# Patient Record
Sex: FEMALE | Race: BLACK OR AFRICAN AMERICAN | NOT HISPANIC OR LATINO | Employment: FULL TIME | URBAN - METROPOLITAN AREA
[De-identification: names, ages, dates, MRNs, and addresses within clinical notes are randomized per-mention and may not be internally consistent; named-entity substitution may affect disease eponyms.]

---

## 2017-01-26 ENCOUNTER — TRANSFERRED RECORDS (OUTPATIENT)
Dept: HEALTH INFORMATION MANAGEMENT | Facility: CLINIC | Age: 47
End: 2017-01-26

## 2017-01-26 ENCOUNTER — TELEPHONE (OUTPATIENT)
Dept: FAMILY MEDICINE | Facility: CLINIC | Age: 47
End: 2017-01-26

## 2017-01-26 NOTE — TELEPHONE ENCOUNTER
Lashonda Rivera is a 46 year old female who calls with chest pain.     PRESENTING PROBLEM:  Chest pain    NURSING ASSESSMENT:  Patient complains of chest pain and fatigue.  Onset:  4 days  Pain is characterized as pressure, sharp and tightness   Severity 3/10-6/10 out of 10 and intermittent  Located right chest   Radiates to none.  Duration intermittent.  Associated symptoms SOB, nausea and lightheadedness. Patient also reports having possible sinus congestion and believes she may have a sinus infection.  Exacerbated by exertion.  Relieved by none.  Cardiac risk factors: none.  Associated Medical History: n/a  Allergies:   Allergies   Allergen Reactions     Vicodin [Hydrocodone-Acetaminophen]      itchy       Last exam/Treatment:  10/12/16    NURSING PLAN: Nursing advice to patient proceed to ER for further eval    RECOMMENDED DISPOSITION:  To ED, another person to drive   Will comply with recommendation: Yes  If further questions/concerns or if symptoms do not improve, worsen or new symptoms develop, call your PCP or [facility] as soon as possible.      Guideline used:  Telephone Triage Protocols for Nurses, Fifth Edition, Angeli Garcia RN

## 2017-02-21 ENCOUNTER — OFFICE VISIT (OUTPATIENT)
Dept: FAMILY MEDICINE | Facility: CLINIC | Age: 47
End: 2017-02-21
Payer: COMMERCIAL

## 2017-02-21 VITALS
TEMPERATURE: 97.5 F | WEIGHT: 142 LBS | HEART RATE: 89 BPM | BODY MASS INDEX: 24.24 KG/M2 | DIASTOLIC BLOOD PRESSURE: 75 MMHG | HEIGHT: 64 IN | SYSTOLIC BLOOD PRESSURE: 122 MMHG

## 2017-02-21 DIAGNOSIS — N89.8 VAGINAL IRRITATION: Primary | ICD-10-CM

## 2017-02-21 LAB
MICRO REPORT STATUS: NORMAL
SPECIMEN SOURCE: NORMAL
WET PREP SPEC: NORMAL

## 2017-02-21 PROCEDURE — 99213 OFFICE O/P EST LOW 20 MIN: CPT | Performed by: PREVENTIVE MEDICINE

## 2017-02-21 PROCEDURE — 87210 SMEAR WET MOUNT SALINE/INK: CPT | Performed by: PREVENTIVE MEDICINE

## 2017-02-21 ASSESSMENT — PAIN SCALES - GENERAL: PAINLEVEL: NO PAIN (0)

## 2017-02-21 NOTE — PATIENT INSTRUCTIONS
At [facility], we strive to deliver an exceptional experience to you, every time we see you.    If you receive a survey in the mail, please send us back your thoughts. We really do value your feedback.    Thank you for visiting [facility]    Normal or non-critical lab and imaging results will be communicated to you by MyChart, letter or phone within 7 days.  If you do not hear from us within 10 days, please call the clinic. If you have a critical or abnormal lab result, we will notify you by phone as soon as possible.     If you have any questions regarding your visit please contact:     Team Radha/Spirit  Clinic Hours Telephone Number   Dr. Lance Cedeno   7am-7pm  Monday through Thursday  7am-5pm Friday [phone]  Charisse LOPEZ RN   Pharmacy 8:30am-9pm Monday-Friday    9am-5pm Saturday-Sunday [phone]   Urgent Care 11am-9pm Monday-Friday        9am-5pm Saturday-Sunday [phone]     After hours, weekend or if you need to make an appointment with your primary provider please call [phone].   After Hours nurse advise: call [facility]: [phone]    Use Green Vision Systemshart (secure email communication and access to your chart) to send your primary care provider a message or make an appointment. Ask someone on your Team how to sign up for [employer]garments. To log on to [employer] or for more information in [place] please visit the website at [email or web address].   As of October 8, 2013, all password changes, disabled accounts, or ID changes in [employer]/MyHealth will be done by our Access Services Department.   If you need help with your account or password, call: [phone]. Clinic staff no longer has the ability to change passwords.
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
## 2017-02-21 NOTE — MR AVS SNAPSHOT
After Visit Summary   2/21/2017    Lashonda Rivera    MRN: [id number]           Patient Information     Date Of Birth          1970        Visit Information        Provider Department      2/21/2017 1:40 PM Asmita Bhatia MD [facility]        Today's Diagnoses     Vaginal irritation    -  1      Care Instructions    At [facility], we strive to deliver an exceptional experience to you, every time we see you.    If you receive a survey in the mail, please send us back your thoughts. We really do value your feedback.    Thank you for visiting [facility]    Normal or non-critical lab and imaging results will be communicated to you by MyChart, letter or phone within 7 days.  If you do not hear from us within 10 days, please call the clinic. If you have a critical or abnormal lab result, we will notify you by phone as soon as possible.     If you have any questions regarding your visit please contact:     Team Radha/Spirit  Clinic Hours Telephone Number   Dr. Lance Cedeno   7am-7pm  Monday through Thursday  7am-5pm Friday [phone]  Charisse LOPEZ RN   Pharmacy 8:30am-9pm Monday-Friday    9am-5pm Saturday-Sunday [phone]   Urgent Care 11am-9pm Monday-Friday        9am-5pm Saturday-Sunday [phone]     After hours, weekend or if you need to make an appointment with your primary provider please call [phone].   After Hours nurse advise: call [facility]: [phone]    Use Gauzyhart (secure email communication and access to your chart) to send your primary care provider a message or make an appointment. Ask someone on your Team how to sign up for mobicanvas. To log on to [employer] or for more information in [place] please visit the website at [email or web address].   As of October 8, 2013, all password changes,  disabled accounts, or ID changes in GOODWIN/MyHealth will be done by our Access Services Department.   If you need help with your account or password, call: [phone]. Clinic staff no longer has the ability to change passwords.           Follow-ups after your visit        Follow-up notes from your care team     Return if symptoms worsen or fail to improve.      Your next 10 appointments already scheduled     Feb 25, 2017 11:30 AM CST   [facility] SCREENING DIG[id number]    [address] [place] [address] [phone]           Do not use any powder, lotion or deodorant under your arms or on your breast. If you do, we will ask you to remove it before your exam.  Wear comfortable, two-piece clothing.  If you have any allergies, tell your care team.  Bring any previous mammograms from other facilities or have them mailed to the breast center.              Who to contact     If you have questions or need follow up information about today's clinic visit or your schedule please contact [facility] directly at [phone].  Normal or non-critical lab and imaging results will be communicated to you by Crazideahart, letter or phone within 4 business days after the clinic has received the results. If you do not hear from us within 7 days, please contact the clinic through GigsTimet or phone. If you have a critical or abnormal lab result, we will notify you by phone as soon as possible.  Submit refill requests through GOODWIN or call your pharmacy and they will forward the refill request to us. Please allow 3 business days for your refill to be completed.          Additional Information About Your Visit        GOODWIN Information     GOODWIN gives you secure access to your electronic health record. If you see a primary care provider, you can also send messages to your care team and make appointments. If you have  "questions, please call your primary care clinic.  If you do not have a primary care provider, please call [phone] and they will assist you.        Care EveryWhere ID     This is your Care EveryWhere ID. This could be used by other organizations to access your [place] medical records  [id number]        Your Vitals Were     Pulse Temperature Height Last Period Breastfeeding? BMI (Body Mass Index)    89 97.5  F (36.4  C) (Oral) 5' 4\" (1.626 m) 06/10/2013 No 24.37 kg/m2       Blood Pressure from Last 3 Encounters:   02/21/17 122/75   10/12/16 105/72   10/04/16 118/73    Weight from Last 3 Encounters:   02/21/17 142 lb (64.4 kg)   10/12/16 142 lb 12.8 oz (64.8 kg)   10/04/16 137 lb 2 oz (62.2 kg)              We Performed the Following     Wet prep        Primary Care Provider Office Phone # Fax #    Asmita Bhatia [phone]       [facility] [address]  [facility] [address]        Thank you!     Thank you for choosing [facility]  for your care. Our goal is always to provide you with excellent care. Hearing back from our patients is one way we can continue to improve our services. Please take a few minutes to complete the written survey that you may receive in the mail after your visit with us. Thank you!             Your Updated Medication List - Protect others around you: Learn how to safely use, store and throw away your medicines at www.disposemymeds.org.          This list is accurate as of: 2/21/17  2:38 PM.  Always use your most recent med list.                   Brand Name Dispense Instructions for use    ADVIL PO      Take 200 mg by mouth       cyclobenzaprine 5 MG tablet    FLEXERIL    30 tablet    Take 1 tablet (5 mg) by mouth 2 times daily as needed for muscle spasms       fluticasone 50 MCG/ACT spray    FLONASE    16 g    Spray 1-2 sprays into both nostrils daily       FOLIC ACID PO      Take 1 mg by mouth daily       METOCLOPRAMIDE HCL PO "      Take 10 mg by mouth daily as needed       PROBIOTIC DAILY PO      Take 2 tablets by mouth daily       SUMAtriptan 100 MG tablet    IMITREX    18 tablet    Take 1 tablet (100 mg) by mouth at onset of headache for migraine May repeat in 2 hours if needed: max 2/day       tiZANidine 2 MG tablet    ZANAFLEX         VITAMIN C PO          VITAMIN D (CHOLECALCIFEROL) PO      Take by mouth daily

## 2017-02-25 ENCOUNTER — RADIANT APPOINTMENT (OUTPATIENT)
Dept: MAMMOGRAPHY | Facility: CLINIC | Age: 47
End: 2017-02-25
Payer: COMMERCIAL

## 2017-02-25 DIAGNOSIS — Z12.31 VISIT FOR SCREENING MAMMOGRAM: ICD-10-CM

## 2017-02-25 PROCEDURE — G0202 SCR MAMMO BI INCL CAD: HCPCS | Mod: TC

## 2017-03-15 ENCOUNTER — OFFICE VISIT (OUTPATIENT)
Dept: FAMILY MEDICINE | Facility: CLINIC | Age: 47
End: 2017-03-15
Payer: COMMERCIAL

## 2017-03-15 VITALS
TEMPERATURE: 97.5 F | DIASTOLIC BLOOD PRESSURE: 68 MMHG | WEIGHT: 139 LBS | HEART RATE: 91 BPM | BODY MASS INDEX: 23.86 KG/M2 | SYSTOLIC BLOOD PRESSURE: 118 MMHG

## 2017-03-15 DIAGNOSIS — R07.0 THROAT PAIN: ICD-10-CM

## 2017-03-15 DIAGNOSIS — J06.9 VIRAL URI WITH COUGH: Primary | ICD-10-CM

## 2017-03-15 LAB
DEPRECATED S PYO AG THROAT QL EIA: NORMAL
MICRO REPORT STATUS: NORMAL
SPECIMEN SOURCE: NORMAL

## 2017-03-15 PROCEDURE — 99213 OFFICE O/P EST LOW 20 MIN: CPT | Performed by: PREVENTIVE MEDICINE

## 2017-03-15 PROCEDURE — 87081 CULTURE SCREEN ONLY: CPT | Performed by: PREVENTIVE MEDICINE

## 2017-03-15 PROCEDURE — 87880 STREP A ASSAY W/OPTIC: CPT | Performed by: PREVENTIVE MEDICINE

## 2017-03-15 NOTE — LETTER
[address] [address]  [phone]  Dept: [phone]      3/15/2017    Re: Lashonda Rivera      TO WHOM IT MAY CONCERN:    Lashonda Rivera  was seen on 3/15/17.  Please excuse her  until 3/16/17 due to illness.    Cordially,          Asmita Bhatia MD MPH    [facility]

## 2017-03-15 NOTE — MR AVS SNAPSHOT
After Visit Summary   3/15/2017    Lashonda Rivera    MRN: [id number]           Patient Information     Date Of Birth          1970        Visit Information        Provider Department      3/15/2017 3:40 PM [facility]        Today's Diagnoses     Viral URI with cough    -  1    Throat pain          Care Instructions    Based on your medical history and these are the current health maintenance or preventive care services that you are due for (some may have been done at this visit)  There are no preventive care reminders to display for this patient.      At [facility], we strive to deliver an exceptional experience to you, every time we see you.    If you receive a survey in the mail, please send us back your thoughts. We really do value your feedback.    Your care team's suggested websites for health information:  Www.[place].org : Up to date and easily searchable information on multiple topics.  Www.medlineplus.gov : medication info, interactive tutorials, watch real surgeries online  Www.familydoctor.org : good info from the Academy of Family Physicians  Www.cdc.gov : public health info, travel advisories, epidemics (H1N1)  Www.aap.org : children's health info, normal development, vaccinations  Www.health.[facility].[place].us : [place] dept of health, public health issues in [place], N1N1    How to contact your care team:   Team Radha/Rhett [phone]         Pharmacy [phone]    Dr. Lucas, Blossom Villa PA-C, Dr. Bhatia, Kim KOLB CNP, Moraima Godoy PA-C, Dr. Richards, and KENNEDI Muir CNP    Team RNs: [facility] & [facility]      Clinic hours  M-Th 7 am-7 pm   Fri 7 am-5 pm.   Urgent care M-F 11 am-9 pm,   Sat/Sun 9 am-5 pm.  Pharmacy M-Th 8 am-8 pm Fri 8 am-6 pm  Sat/Sun 9 am-5 pm.     All password changes, disabled accounts, or ID changes in [employer]/MyHealth will be done by our Access Services Department.    If you  need help with your account or password, call: [phone]. Clinic staff no longer has the ability to change passwords.             Follow-ups after your visit        Follow-up notes from your care team     Return if symptoms worsen or fail to improve.      Who to contact     If you have questions or need follow up information about today's clinic visit or your schedule please contact [facility] directly at [phone].  Normal or non-critical lab and imaging results will be communicated to you by HALSCIONhart, letter or phone within 4 business days after the clinic has received the results. If you do not hear from us within 7 days, please contact the clinic through Intuitive User Interfacest or phone. If you have a critical or abnormal lab result, we will notify you by phone as soon as possible.  Submit refill requests through [employer] or call your pharmacy and they will forward the refill request to us. Please allow 3 business days for your refill to be completed.          Additional Information About Your Visit        [employer] Information     [employer] gives you secure access to your electronic health record. If you see a primary care provider, you can also send messages to your care team and make appointments. If you have questions, please call your primary care clinic.  If you do not have a primary care provider, please call [phone] and they will assist you.        Care EveryWhere ID     This is your Care EveryWhere ID. This could be used by other organizations to access your [place] medical records  [id number]        Your Vitals Were     Pulse Temperature Last Period Breastfeeding? BMI (Body Mass Index)       91 97.5  F (36.4  C) (Tympanic) 06/10/2013 No 23.86 kg/m2        Blood Pressure from Last 3 Encounters:   03/15/17 118/68   02/21/17 122/75   10/12/16 105/72    Weight from Last 3 Encounters:   03/15/17 139 lb (63 kg)   02/21/17 142 lb (64.4 kg)   10/12/16 142 lb 12.8 oz (64.8 kg)              We  Performed the Following     Beta strep group A culture     Strep, Rapid Screen        Primary Care Provider Office Phone # Fax #    Asmita Bhatia [phone]       [facility] [address] [place]  [facility] [address]        Thank you!     Thank you for choosing [facility]  for your care. Our goal is always to provide you with excellent care. Hearing back from our patients is one way we can continue to improve our services. Please take a few minutes to complete the written survey that you may receive in the mail after your visit with us. Thank you!             Your Updated Medication List - Protect others around you: Learn how to safely use, store and throw away your medicines at www.disposemymeds.org.          This list is accurate as of: 3/15/17  4:24 PM.  Always use your most recent med list.                   Brand Name Dispense Instructions for use    ADVIL PO      Take 200 mg by mouth       cyclobenzaprine 5 MG tablet    FLEXERIL    30 tablet    Take 1 tablet (5 mg) by mouth 2 times daily as needed for muscle spasms       fluticasone 50 MCG/ACT spray    FLONASE    16 g    Spray 1-2 sprays into both nostrils daily       FOLIC ACID PO      Take 1 mg by mouth daily       METOCLOPRAMIDE HCL PO      Take 10 mg by mouth daily as needed       PROBIOTIC DAILY PO      Take 2 tablets by mouth daily       SUMAtriptan 100 MG tablet    IMITREX    18 tablet    Take 1 tablet (100 mg) by mouth at onset of headache for migraine May repeat in 2 hours if needed: max 2/day       tiZANidine 2 MG tablet    ZANAFLEX         VITAMIN C PO          VITAMIN D (CHOLECALCIFEROL) PO      Take by mouth daily

## 2017-03-15 NOTE — NURSING NOTE
"Chief Complaint   Patient presents with     URI     ear pain & sore throat       Initial /68 (BP Location: Right arm, Patient Position: Chair, Cuff Size: Adult Regular)  Pulse 91  Temp 97.5  F (36.4  C) (Tympanic)  Wt 139 lb (63 kg)  LMP 06/10/2013  Breastfeeding? No  BMI 23.86 kg/m2 Estimated body mass index is 23.86 kg/(m^2) as calculated from the following:    Height as of 2/21/17: 5' 4\" (1.626 m).    Weight as of this encounter: 139 lb (63 kg).  Medication Reconciliation: complete   An,CMA      "

## 2017-03-15 NOTE — PROGRESS NOTES
SUBJECTIVE:                                                    Lashonda Rivera is a 46 year old female who presents to clinic today for the following health issues:    I have reviewed and agree with the documentation by the MA. I updated the history as indicated.  Asmita Bhatia MD MPH    Acute Illness   Acute illness concerns: Ear pain and sore throat  Onset: 4 days ago    Fever: no    Chills/Sweats: no    Headache (location?): YES    Sinus Pressure:no    Conjunctivitis:  no    Ear Pain: YES: right    Rhinorrhea: YES    Congestion: YES    Sore Throat: YES     Cough: YES    Wheeze: no    Decreased Appetite: YES    Nausea: YES    Vomiting: no    Diarrhea:  yes    Dysuria/Freq.: no    Fatigue/Achiness: YES    Sick/Strep Exposure: no     Therapies Tried and outcome: na    No ear drainage, no loss of hearing. No emesis. No abdominal pain, some loose stools.     Problem list and histories reviewed & adjusted, as indicated.  Additional history: as documented    Patient Active Problem List   Diagnosis     CARDIOVASCULAR SCREENING; LDL GOAL LESS THAN 160     Tension headache     Migraine headache     Adjustment disorder with depressed mood     Fibroadenoma, right     Seasonal allergic rhinitis, unspecified allergic rhinitis trigger     Past Surgical History   Procedure Laterality Date     Surgical history of -   2004     L wrist tendon     Skin graft, each addn 100sqcm  1977     buttocks to left  foot     C treat ectopic preg,rmv tube/ovary  1995     C total abdom hysterectomy  06/11/2013     benign fibroids     Hysterectomy, pap no longer indicated       Biopsy breast Right 6/22/2016     Procedure: BIOPSY BREAST;  Surgeon: Kaiser Roy MD;  Location: [facility]       Social History   Substance Use Topics     Smoking status: Never Smoker     Smokeless tobacco: Never Used     Alcohol use No     Family History   Problem Relation Age of Onset     DIABETES Mother      DIABETES Maternal Grandmother      Hypertension  Maternal Grandmother      Arthritis Maternal Grandmother      CANCER Maternal Grandmother      bladder cancer/cervix     CANCER Maternal Grandfather      bone     Asthma Son      Unknown/Adopted Paternal Grandfather      Unknown/Adopted Paternal Grandmother          Current Outpatient Prescriptions   Medication Sig Dispense Refill     fluticasone (FLONASE) 50 MCG/ACT nasal spray Spray 1-2 sprays into both nostrils daily 16 g 0     Ascorbic Acid (VITAMIN C PO)        VITAMIN D, CHOLECALCIFEROL, PO Take by mouth daily       FOLIC ACID PO Take 1 mg by mouth daily       METOCLOPRAMIDE HCL PO Take 10 mg by mouth daily as needed       tiZANidine (ZANAFLEX) 2 MG tablet   3     SUMAtriptan (IMITREX) 100 MG tablet Take 1 tablet (100 mg) by mouth at onset of headache for migraine May repeat in 2 hours if needed: max 2/day 18 tablet 1     Ibuprofen (ADVIL PO) Take 200 mg by mouth       cyclobenzaprine (FLEXERIL) 5 MG tablet Take 1 tablet (5 mg) by mouth 2 times daily as needed for muscle spasms 30 tablet 1     Probiotic Product (PROBIOTIC DAILY PO) Take 2 tablets by mouth daily        Allergies   Allergen Reactions     Vicodin [Hydrocodone-Acetaminophen]      itchy     BP Readings from Last 3 Encounters:   03/15/17 118/68   02/21/17 122/75   10/12/16 105/72    Wt Readings from Last 3 Encounters:   03/15/17 139 lb (63 kg)   02/21/17 142 lb (64.4 kg)   10/12/16 142 lb 12.8 oz (64.8 kg)                    Reviewed and updated as needed this visit by clinical staff       Reviewed and updated as needed this visit by Provider         ROS:  Constitutional, HEENT, cardiovascular, pulmonary, gi and gu systems are negative, except as otherwise noted.    OBJECTIVE:                                                    /68 (BP Location: Right arm, Patient Position: Chair, Cuff Size: Adult Regular)  Pulse 91  Temp 97.5  F (36.4  C) (Tympanic)  Wt 139 lb (63 kg)  LMP 06/10/2013  Breastfeeding? No  BMI 23.86 kg/m2  Body mass index is  23.86 kg/(m^2).  GENERAL APPEARANCE: healthy, alert and no distress  EYES: Eyes grossly normal to inspection and conjunctivae and sclerae normal  HENT: ear canals and TM's normal, nose and mouth without ulcers or lesions and no pharyngeal exudates or pus points, no uvular deviation   NECK: no adenopathy, no asymmetry, masses, or scars and trachea midline and normal to palpation  RESP: lungs clear to auscultation - no rales, rhonchi or wheezes  CV: regular rates and rhythm, normal S1 S2, no S3 or S4 and no murmur, click or rub  ABDOMEN: soft, nontender, without hepatosplenomegaly or masses  MS: extremities normal- no gross deformities noted  SKIN: no suspicious lesions or rashes  NEURO: Normal strength and tone, mentation intact and speech normal  PSYCH: mentation appears normal    Diagnostic test results:  Diagnostic Test Results:  Results for orders placed or performed in visit on 03/15/17 (from the past 24 hour(s))   Strep, Rapid Screen   Result Value Ref Range    Specimen Description Throat     Rapid Strep A Screen       NEGATIVE: No Group A streptococcal antigen detected by immunoassay, await   culture report.      Micro Report Status FINAL 03/15/2017         ASSESSMENT/PLAN:                                                    1. Viral URI with cough  -Work note provided  Your symptoms and exam today indicate that you have a viral upper respiratory illness.  This includes viral rhinosinusitis and viral bronchitis.  Antibiotics do not help viral illnesses; the best remedies treat the symptoms (see below).  The typical course of a viral illness is that you feel miserable for the first few days - with sore throat, runny nose/nasal congestion, cough, and sometimes fever and body aches.  You should start to feel better after about 5-7 days and much better by 10-14 days.  If you develop sudden worsening of symptoms or fever after the first 5-7 days, or if you have persistence of your symptoms beyond 14 days, let us  know as you may have developed a secondary bacterial infection.  Get plenty of rest, especially while you have a fever. Stop smoking and avoid secondhand smoke. Drink lots of fluids such as water and clear soups. Fluids help loosen mucus. Fluids are also important because they help prevent dehydration. Gargle with warm salt water a few times a day to relieve a sore throat. Throat sprays or lozenges may also help relieve the pain.Avoid alcohol. Use saline (salt water) nose drops to help loosen mucus and moisten the tender skin in your nose.      2. Throat pain  -rapid strep is negative, await throat culture results.   - Strep, Rapid Screen  - Beta strep group A culture    I ended our visit today by discussing the patient's diagnoses and recommended treatment. Please refer to today's diagnoses and orders for further details. I briefly discussed the pathophysiology of these conditions and outlined their expected course. I discussed the warning symptoms and signs that indicate an atypical course that would need urgent or emergent care. I also discussed self care strategies for symptom relief.  Patient voiced complete understanding of plan of care and was in full agreement to proceed.       Follow up with Provider - If not improving in one week otherwise as scheduled      Asmita Bhatia MD MPH    [facility]

## 2017-03-15 NOTE — PATIENT INSTRUCTIONS
Based on your medical history and these are the current health maintenance or preventive care services that you are due for (some may have been done at this visit)  There are no preventive care reminders to display for this patient.      At [facility], we strive to deliver an exceptional experience to you, every time we see you.    If you receive a survey in the mail, please send us back your thoughts. We really do value your feedback.    Your care team's suggested websites for health information:  Www.[facility].org : Up to date and easily searchable information on multiple topics.  Www.medlineplus.gov : medication info, interactive tutorials, watch real surgeries online  Www.familydoctor.org : good info from the Academy of Family Physicians  Www.cdc.gov : public health info, travel advisories, epidemics (H1N1)  Www.aap.org : children's health info, normal development, vaccinations  Www.health.[facility].[place].us : [place] dept of health, public health issues in [place], N1N1    How to contact your care team:   Team [facility] [phone]         Pharmacy [phone]    Dr. Lucas, Blossom Villa PA-C, Dr. Bhatia, Kim KOLB CNP, Moraima Godoy PA-C, Dr. Richards, and KENNEDI Muir CNP    Team RNs: Charisse Avery      Clinic hours  M-Th 7 am-7 pm   Fri 7 am-5 pm.   Urgent care M-F 11 am-9 pm,   Sat/Sun 9 am-5 pm.  Pharmacy M-Th 8 am-8 pm Fri 8 am-6 pm  Sat/Sun 9 am-5 pm.     All password changes, disabled accounts, or ID changes in Sensitive Object/MyHealth will be done by our Access Services Department.    If you need help with your account or password, call: [phone]. Clinic staff no longer has the ability to change passwords.

## 2017-03-17 LAB
BACTERIA SPEC CULT: NORMAL
MICRO REPORT STATUS: NORMAL
SPECIMEN SOURCE: NORMAL

## 2017-03-19 NOTE — PROGRESS NOTES
Lashonda, your test results were within normal limits. The rapid strep was negative as discussed in clinic.  Throat culture also was negative for strep infection.    Please do not hesitate to call us at [phone] if you have any questions or concerns.    Thank you,    Asmita Bhatia MD MPH

## 2017-04-18 ENCOUNTER — TRANSFERRED RECORDS (OUTPATIENT)
Dept: HEALTH INFORMATION MANAGEMENT | Facility: CLINIC | Age: 47
End: 2017-04-18

## 2017-04-22 ENCOUNTER — OFFICE VISIT (OUTPATIENT)
Dept: URGENT CARE | Facility: URGENT CARE | Age: 47
End: 2017-04-22
Payer: COMMERCIAL

## 2017-04-22 VITALS
HEART RATE: 99 BPM | SYSTOLIC BLOOD PRESSURE: 107 MMHG | BODY MASS INDEX: 24.03 KG/M2 | DIASTOLIC BLOOD PRESSURE: 68 MMHG | WEIGHT: 140 LBS | OXYGEN SATURATION: 97 % | TEMPERATURE: 98.2 F

## 2017-04-22 DIAGNOSIS — J06.9 UPPER RESPIRATORY TRACT INFECTION, UNSPECIFIED TYPE: Primary | ICD-10-CM

## 2017-04-22 PROCEDURE — 99213 OFFICE O/P EST LOW 20 MIN: CPT | Performed by: INTERNAL MEDICINE

## 2017-04-22 RX ORDER — NORTRIPTYLINE HCL 10 MG
CAPSULE ORAL
COMMUNITY
Start: 2017-04-18 | End: 2018-03-26

## 2017-04-22 RX ORDER — ALBUTEROL SULFATE 90 UG/1
2 AEROSOL, METERED RESPIRATORY (INHALATION) EVERY 4 HOURS PRN
Qty: 1 INHALER | Refills: 0 | Status: SHIPPED | OUTPATIENT
Start: 2017-04-22 | End: 2020-02-06

## 2017-04-22 NOTE — MR AVS SNAPSHOT
After Visit Summary   4/22/2017    Lashonda Rivera    MRN: [id number]           Patient Information     Date Of Birth          1970        Visit Information        Provider Department      4/22/2017 9:10 AM Traci Jesus MD [facility]        Today's Diagnoses     Upper respiratory tract infection, unspecified type    -  1       Follow-ups after your visit        Follow-up notes from your care team     Return if symptoms worsen or fail to improve.      Who to contact     If you have questions or need follow up information about today's clinic visit or your schedule please contact [facility] directly at [phone].  Normal or non-critical lab and imaging results will be communicated to you by MyChart, letter or phone within 4 business days after the clinic has received the results. If you do not hear from us within 7 days, please contact the clinic through [employer] or phone. If you have a critical or abnormal lab result, we will notify you by phone as soon as possible.  Submit refill requests through [employer] or call your pharmacy and they will forward the refill request to us. Please allow 3 business days for your refill to be completed.          Additional Information About Your Visit        MyChart Information     [employer] gives you secure access to your electronic health record. If you see a primary care provider, you can also send messages to your care team and make appointments. If you have questions, please call your primary care clinic.  If you do not have a primary care provider, please call [phone] and they will assist you.        Care EveryWhere ID     This is your Care EveryWhere ID. This could be used by other organizations to access your [place] medical records  [id number]        Your Vitals Were     Pulse Temperature Last Period Pulse Oximetry Breastfeeding? BMI (Body Mass Index)    99 98.2  F (36.8  C) (Oral) 06/10/2013 97% No  24.03 kg/m2       Blood Pressure from Last 3 Encounters:   04/22/17 107/68   03/15/17 118/68   02/21/17 122/75    Weight from Last 3 Encounters:   04/22/17 140 lb (63.5 kg)   03/15/17 139 lb (63 kg)   02/21/17 142 lb (64.4 kg)              Today, you had the following     No orders found for display         Today's Medication Changes          These changes are accurate as of: 4/22/17  9:26 AM.  If you have any questions, ask your nurse or doctor.               Start taking these medicines.        Dose/Directions    albuterol 108 (90 BASE) MCG/ACT Inhaler   Commonly known as:  PROAIR HFA/PROVENTIL HFA/VENTOLIN HFA   Used for:  Upper respiratory tract infection, unspecified type   Started by:  Traci Jseus MD        Dose:  2 puff   Inhale 2 puffs into the lungs every 4 hours as needed   Quantity:  1 Inhaler   Refills:  0            Where to get your medicines      These medications were sent to [facility] [address] [place] [address] [address] [facility]  [address] [facility] [address]    Hours:  24-hours Phone:  [phone]     albuterol 108 (90 BASE) MCG/ACT Inhaler                Primary Care Provider Office Phone # Fax #    Asmita Bhatia [phone]       [facility] [address]  [facility] [address]        Thank you!     Thank you for choosing [facility]  for your care. Our goal is always to provide you with excellent care. Hearing back from our patients is one way we can continue to improve our services. Please take a few minutes to complete the written survey that you may receive in the mail after your visit with us. Thank you!             Your Updated Medication List - Protect others around you: Learn how to safely use, store and throw away your medicines at www.disposemymeds.org.          This list is accurate as of: 4/22/17  9:26 AM.  Always use your most recent med list.                    Brand Name Dispense Instructions for use    ADVIL PO      Take 200 mg by mouth       albuterol 108 (90 BASE) MCG/ACT Inhaler    PROAIR HFA/PROVENTIL HFA/VENTOLIN HFA    1 Inhaler    Inhale 2 puffs into the lungs every 4 hours as needed       cyclobenzaprine 5 MG tablet    FLEXERIL    30 tablet    Take 1 tablet (5 mg) by mouth 2 times daily as needed for muscle spasms       fluticasone 50 MCG/ACT spray    FLONASE    16 g    Spray 1-2 sprays into both nostrils daily       FOLIC ACID PO      Take 1 mg by mouth daily       METOCLOPRAMIDE HCL PO      Take 10 mg by mouth daily as needed       nortriptyline 10 MG capsule    PAMELOR         PROBIOTIC DAILY PO      Take 2 tablets by mouth daily       SUMAtriptan 100 MG tablet    IMITREX    18 tablet    Take 1 tablet (100 mg) by mouth at onset of headache for migraine May repeat in 2 hours if needed: max 2/day       tiZANidine 2 MG tablet    ZANAFLEX         VITAMIN C PO          VITAMIN D (CHOLECALCIFEROL) PO      Take by mouth daily

## 2017-04-22 NOTE — NURSING NOTE
"Chief Complaint   Patient presents with     URI     Sx started last Sat with sore throat, pressure in chest, mucus with some blood in it, and ear pressure, with cough. Pt states that she has no relief with OTC allergy and flu medications.        Initial /68 (BP Location: Left arm, Patient Position: Chair, Cuff Size: Adult Regular)  Pulse 99  Temp 98.2  F (36.8  C) (Oral)  Wt 140 lb (63.5 kg)  LMP 06/10/2013  SpO2 97%  Breastfeeding? No  BMI 24.03 kg/m2 Estimated body mass index is 24.03 kg/(m^2) as calculated from the following:    Height as of 2/21/17: 5' 4\" (1.626 m).    Weight as of this encounter: 140 lb (63.5 kg).  Medication Reconciliation: complete     Yin Scherer CMA (AAMA)      "

## 2017-04-22 NOTE — PROGRESS NOTES
SUBJECTIVE:  Lashonda Rivera is an 46 year old female who presents for not feeling well.  A week ago started to have some sore throat and not feel good.  Has been coughing up mucous.  Breathing sometimes feels tight with cough.  Is coughing quite a bit.  Had a headache so took some benadryl which helped.  Taking nyquil and theraflu and tea which help only a little bit.  Not check temp, but has felt hot and cold at times.  Some nausea, no vomiting or diarrhea.  No known exposures.  No recent travel.  A little right ear pain.  No skin rashes.  Feels achy at times.  Has runny and stuffy nose.  Some drainage down back of the throat.        has a past medical history of Hypothyroidism (2005); Leiomyoma of uterus, unspecified (2013); Menorrhagia (2000); Recurrent UTI (2012); and Vitamin B 12 deficiency (2005).  ALLERGIES:  Vicodin [hydrocodone-acetaminophen]    Current Outpatient Prescriptions   Medication     nortriptyline (PAMELOR) 10 MG capsule     fluticasone (FLONASE) 50 MCG/ACT nasal spray     Ascorbic Acid (VITAMIN C PO)     VITAMIN D, CHOLECALCIFEROL, PO     FOLIC ACID PO     METOCLOPRAMIDE HCL PO     tiZANidine (ZANAFLEX) 2 MG tablet     SUMAtriptan (IMITREX) 100 MG tablet     Ibuprofen (ADVIL PO)     cyclobenzaprine (FLEXERIL) 5 MG tablet     Probiotic Product (PROBIOTIC DAILY PO)     No current facility-administered medications for this visit.          ROS:  ROS is done and is negative for general/constitutional, eye, ENT, Respiratory, cardiovascular, GI, , Skin, musculoskeletal except as noted elsewhere.      OBJECTIVE:  /68 (BP Location: Left arm, Patient Position: Chair, Cuff Size: Adult Regular)  Pulse 99  Temp 98.2  F (36.8  C) (Oral)  Wt 140 lb (63.5 kg)  LMP 06/10/2013  SpO2 97%  Breastfeeding? No  BMI 24.03 kg/m2  GENERAL APPEARANCE: Alert, in no acute distress  EYES: normal  EARS: External ears normal. Canals clear. TMs with small clear effusions behind, no erythema  NOSE: mildly  inflamed and clear discharge  OROPHARYNX:normal  NECK:No adenopathy,masses or thyromegaly  RESP: normal and clear to auscultation  CV:regular rate and rhythm and no murmurs, clicks, or gallops  ABDOMEN: Abdomen soft, non-tender. BS normal. No masses, organomegaly  SKIN: no ulcers, lesions or rash  MUSCULOSKELETAL:Musculoskeletal normal      RECENT LAB RESULTS  .    ASSESSMENT/PLAN:    ASSESSMENT / PLAN:  (J06.9) Upper respiratory tract infection, unspecified type  (primary encounter diagnosis)  Comment: sxs c/w viral uri.  Plan: albuterol (PROAIR HFA/PROVENTIL HFA/VENTOLIN         HFA) 108 (90 BASE) MCG/ACT Inhaler        Reviewed medication instructions and side effects. Follow up if experiences side effects.. I reviewed supportive care, expected course, and signs of concern.  Follow up prn or if she does not improve or if worsens in any way.  Also use the flonase she already has at home.      See [facility] for orders, medications, letters, patient instructions    Traci Jesus M.D.

## 2017-07-18 ENCOUNTER — TRANSFERRED RECORDS (OUTPATIENT)
Dept: HEALTH INFORMATION MANAGEMENT | Facility: CLINIC | Age: 47
End: 2017-07-18

## 2017-08-07 ENCOUNTER — TELEPHONE (OUTPATIENT)
Dept: FAMILY MEDICINE | Facility: CLINIC | Age: 47
End: 2017-08-07

## 2017-08-07 NOTE — TELEPHONE ENCOUNTER
Reason for Call: Request for a referral:    Order or referral being requested: referral for date of visit June 2017 for Dr Sherman Oral Surgeon for cancer cell  Biopsy please call back when approved and she will give you the fax # for the specalialist to send to his billing area to resolve a payment concern     Date needed: as soon as possible    Has the patient been seen by the PCP for this problem? YES    Additional comments: any    Phone number Patient can be reached at: [phone]    Best Time:  Any    Can we leave a detailed message on this number?  YES    Call taken on 8/7/2017 at 10:43 AM by Eva Lowe

## 2017-08-08 ENCOUNTER — MYC MEDICAL ADVICE (OUTPATIENT)
Dept: FAMILY MEDICINE | Facility: CLINIC | Age: 47
End: 2017-08-08

## 2017-08-08 NOTE — TELEPHONE ENCOUNTER
Please assist patient in setting up a My Chart visit so I can address this in more detail.  Thanks,  Asmita Bhatia MD MPH

## 2017-09-12 ENCOUNTER — OFFICE VISIT (OUTPATIENT)
Dept: FAMILY MEDICINE | Facility: CLINIC | Age: 47
End: 2017-09-12
Payer: COMMERCIAL

## 2017-09-12 VITALS
TEMPERATURE: 97 F | HEART RATE: 84 BPM | HEIGHT: 64 IN | OXYGEN SATURATION: 100 % | SYSTOLIC BLOOD PRESSURE: 113 MMHG | DIASTOLIC BLOOD PRESSURE: 80 MMHG | WEIGHT: 146 LBS | BODY MASS INDEX: 24.92 KG/M2

## 2017-09-12 DIAGNOSIS — Z00.00 ROUTINE GENERAL MEDICAL EXAMINATION AT A HEALTH CARE FACILITY: Primary | ICD-10-CM

## 2017-09-12 DIAGNOSIS — R09.82 POST-NASAL DRAINAGE: ICD-10-CM

## 2017-09-12 DIAGNOSIS — Z23 NEED FOR PROPHYLACTIC VACCINATION AND INOCULATION AGAINST INFLUENZA: ICD-10-CM

## 2017-09-12 DIAGNOSIS — S06.9X0S TBI (TRAUMATIC BRAIN INJURY), WITHOUT LOC, SEQUELA: ICD-10-CM

## 2017-09-12 DIAGNOSIS — K13.70 LESION OF ORAL MUCOSA: ICD-10-CM

## 2017-09-12 LAB
CHOLEST SERPL-MCNC: 168 MG/DL
GLUCOSE SERPL-MCNC: 88 MG/DL (ref 70–99)
HDLC SERPL-MCNC: 58 MG/DL
LDLC SERPL CALC-MCNC: 100 MG/DL
NONHDLC SERPL-MCNC: 110 MG/DL
TRIGL SERPL-MCNC: 52 MG/DL

## 2017-09-12 PROCEDURE — 90471 IMMUNIZATION ADMIN: CPT | Performed by: PREVENTIVE MEDICINE

## 2017-09-12 PROCEDURE — 99396 PREV VISIT EST AGE 40-64: CPT | Mod: 25 | Performed by: PREVENTIVE MEDICINE

## 2017-09-12 PROCEDURE — 80061 LIPID PANEL: CPT | Performed by: PREVENTIVE MEDICINE

## 2017-09-12 PROCEDURE — 36415 COLL VENOUS BLD VENIPUNCTURE: CPT | Performed by: PREVENTIVE MEDICINE

## 2017-09-12 PROCEDURE — 82947 ASSAY GLUCOSE BLOOD QUANT: CPT | Performed by: PREVENTIVE MEDICINE

## 2017-09-12 PROCEDURE — 90686 IIV4 VACC NO PRSV 0.5 ML IM: CPT | Performed by: PREVENTIVE MEDICINE

## 2017-09-12 RX ORDER — MONTELUKAST SODIUM 10 MG/1
10 TABLET ORAL AT BEDTIME
Qty: 30 TABLET | Refills: 1 | Status: SHIPPED | OUTPATIENT
Start: 2017-09-12 | End: 2018-03-26

## 2017-09-12 ASSESSMENT — PAIN SCALES - GENERAL: PAINLEVEL: NO PAIN (0)

## 2017-09-12 NOTE — NURSING NOTE
"Chief Complaint   Patient presents with     Physical       Initial /80  Pulse 84  Temp 97  F (36.1  C) (Oral)  Ht 5' 4\" (1.626 m)  Wt 146 lb (66.2 kg)  LMP 06/10/2013  SpO2 100%  Breastfeeding? No  BMI 25.06 kg/m2 Estimated body mass index is 25.06 kg/(m^2) as calculated from the following:    Height as of this encounter: 5' 4\" (1.626 m).    Weight as of this encounter: 146 lb (66.2 kg).  Medication Reconciliation: complete   Melissa THOMSON        "
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
## 2017-09-12 NOTE — TELEPHONE ENCOUNTER
Referral was provided during appointment for Physical today. Will close encounter.  Asmita Bhatia MD MPH

## 2017-09-12 NOTE — PROGRESS NOTES
Lashonda, your test results were within normal limits. Cholesterol is at goal for you. Glucose is normal, you do not have diabetes.     Please do not hesitate to call us at [phone] if you have any questions or concerns.    Thank you,    Asmita Bhatia MD MPH

## 2017-09-12 NOTE — PROGRESS NOTES
SUBJECTIVE:   CC: Lashonda Rivera is an 47 year old woman who presents for preventive health visit.     Healthy Habits:    Do you get at least three servings of calcium containing foods daily (dairy, green leafy vegetables, etc.)? yes    Amount of exercise or daily activities, outside of work: 7 day(s) per week    Problems taking medications regularly No    Medication side effects: No    Have you had an eye exam in the past two years? yes    Do you see a dentist twice per year? yes  Do you have sleep apnea, excessive snoring or daytime drowsiness?no      -Needs referral to oral surgeon, has been followed by a surgeon at [facility] for an oral pre cancerous lesion, seen every 6 months. New referral for insurance change reasons  -Thick nasal drainage, more at night, has been using Flonase.   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    Reviewed orders with patient.  Reviewed health maintenance and updated orders accordingly - Yes  Labs reviewed in EPIC  BP Readings from Last 3 Encounters:   09/12/17 113/80   04/22/17 107/68   03/15/17 118/68    Wt Readings from Last 3 Encounters:   09/12/17 146 lb (66.2 kg)   04/22/17 140 lb (63.5 kg)   03/15/17 139 lb (63 kg)                  Patient Active Problem List   Diagnosis     CARDIOVASCULAR SCREENING; LDL GOAL LESS THAN 160     Tension headache     Migraine headache     Adjustment disorder with depressed  mood     Fibroadenoma, right     Seasonal allergic rhinitis, unspecified allergic rhinitis trigger     TBI (traumatic brain injury), without LOC, sequela (H)     Past Surgical History:   Procedure Laterality Date     BIOPSY BREAST Right 6/22/2016    Procedure: BIOPSY BREAST;  Surgeon: Kaiser Roy MD;  Location: [facility]     C TOTAL ABDOM HYSTERECTOMY  06/11/2013    benign fibroids     C TREAT ECTOPIC PREG,RMV TUBE/OVARY  1995     HYSTERECTOMY, PAP NO LONGER INDICATED       SKIN GRAFT, EACH ADDN 100SQCM  1977    buttocks to left  foot     SURGICAL HISTORY OF -   2004    L wrist tendon       Social History   Substance Use Topics     Smoking status: Never Smoker     Smokeless tobacco: Never Used     Alcohol use No     Family History   Problem Relation Age of Onset     DIABETES Mother      DIABETES Maternal Grandmother      Hypertension Maternal Grandmother      Arthritis Maternal Grandmother      CANCER Maternal Grandmother      bladder cancer/cervix     CANCER Maternal Grandfather      bone     Asthma Son      Unknown/Adopted Paternal Grandfather      Unknown/Adopted Paternal Grandmother          Current Outpatient Prescriptions   Medication Sig Dispense Refill     montelukast (SINGULAIR) 10 MG tablet Take 1 tablet (10 mg) by mouth At Bedtime 30 tablet 1     nortriptyline (PAMELOR) 10 MG capsule        albuterol (PROAIR HFA/PROVENTIL HFA/VENTOLIN HFA) 108 (90 BASE) MCG/ACT Inhaler Inhale 2 puffs into the lungs every 4 hours as needed 1 Inhaler 0     fluticasone (FLONASE) 50 MCG/ACT nasal spray Spray 1-2 sprays into both nostrils daily 16 g 0     Ascorbic Acid (VITAMIN C PO)        VITAMIN D, CHOLECALCIFEROL, PO Take by mouth daily       METOCLOPRAMIDE HCL PO Take 10 mg by mouth daily as needed       tiZANidine (ZANAFLEX) 2 MG tablet   3     SUMAtriptan (IMITREX) 100 MG tablet Take 1 tablet (100 mg) by mouth at onset of headache for migraine May repeat in 2 hours if needed: max 2/day 18 tablet 1      cyclobenzaprine (FLEXERIL) 5 MG tablet Take 1 tablet (5 mg) by mouth 2 times daily as needed for muscle spasms 30 tablet 1     Probiotic Product (PROBIOTIC DAILY PO) Take 2 tablets by mouth daily        Allergies   Allergen Reactions     Vicodin [Hydrocodone-Acetaminophen]      itchy           Patient under age 50, mutual decision reflected in health maintenance.        Pertinent mammograms are reviewed under the imaging tab.  History of abnormal Pap smear: Status post benign hysterectomy. Health Maintenance and Surgical History updated.    Reviewed and updated as needed this visit by clinical staffTobao  Meds         Reviewed and updated as needed this visit by Provider        Past Medical History:   Diagnosis Date     Hypothyroidism 2005    Now resolved     Leiomyoma of uterus, unspecified 2013    resolved     Menorrhagia 2000    resolved     Recurrent UTI 2012     Vitamin B 12 deficiency 2005    Resolved      Past Surgical History:   Procedure Laterality Date     BIOPSY BREAST Right 6/22/2016    Procedure: BIOPSY BREAST;  Surgeon: Kaiser Roy MD;  [facility]: [facility]     C TOTAL ABDOM HYSTERECTOMY  06/11/2013    benign fibroids     C TREAT ECTOPIC PREG,RMV TUBE/OVARY  1995     HYSTERECTOMY, PAP NO LONGER INDICATED       SKIN GRAFT, EACH ADDN 100SQCM  1977    buttocks to left  foot     SURGICAL HISTORY OF -   2004    L wrist tendon       ROS:  C: NEGATIVE for fever, chills, change in weight  I: NEGATIVE for worrisome rashes, moles or lesions  E: NEGATIVE for vision changes or irritation  R: NEGATIVE for significant cough or SOB  B: NEGATIVE for masses, tenderness or discharge  CV: NEGATIVE for chest pain, palpitations or peripheral edema  GI: NEGATIVE for nausea, abdominal pain, heartburn, or change in bowel habits  M: NEGATIVE for significant arthralgias or myalgia  N: NEGATIVE for weakness, dizziness or paresthesias  E: NEGATIVE for temperature intolerance, skin/hair changes  H: NEGATIVE for bleeding  "problems  P: NEGATIVE for changes in mood or affect    OBJECTIVE:   /80  Pulse 84  Temp 97  F (36.1  C) (Oral)  Ht 5' 4\" (1.626 m)  Wt 146 lb (66.2 kg)  LMP 06/10/2013  SpO2 100%  Breastfeeding? No  BMI 25.06 kg/m2  EXAM:  GENERAL: healthy, alert and no distress  EYES: Eyes grossly normal to inspection, PERRL and conjunctivae and sclerae normal  HENT: ear canals and TM's normal, nose and mouth without ulcers or lesions  NECK: no adenopathy, no asymmetry, masses  RESP: lungs clear to auscultation - no rales, rhonchi or wheezes  BREAST: normal without masses, tenderness or nipple discharge and no palpable axillary masses or adenopathy  CV: regular rate and rhythm, normal S1 S2, no S3 or S4, no murmur, click or rub, no peripheral edema and peripheral pulses strong  ABDOMEN: soft, nontender, no hepatosplenomegaly, no masses  MS: no gross musculoskeletal defects noted, no edema  SKIN: no suspicious lesions or rashes  NEURO: Normal strength and tone, mentation intact and speech normal  PSYCH: mentation appears normal, affect normal/bright  LYMPH: no cervical, supraclavicular, axillary, adenopathy    ASSESSMENT/PLAN:   Lashonda was seen today for physical.    Diagnoses and all orders for this visit:    Routine general medical examination at a health care facility  -     Glucose  -     Lipid panel reflex to direct LDL  -USPSTF guidelines reviewed     Post-nasal drainage  -     montelukast (SINGULAIR) 10 MG tablet; Take 1 tablet (10 mg) by mouth At Bedtime    TBI (traumatic brain injury), without LOC, sequela (H)  -Followed by [facility]     Lesion of oral mucosa  -     ORAL SURGERY REFERRAL    Need for prophylactic vaccination and inoculation against influenza  -     FLU VAC, SPLIT VIRUS IM > 3 YO (QUADRIVALENT) [06896]  -     Vaccine Administration, Initial [33073]        COUNSELING:   Reviewed preventive health counseling, as reflected in patient instructions       Regular exercise      " " Healthy diet/nutrition       Immunizations    Vaccinated for: Influenza         reports that she has never smoked. She has never used smokeless tobacco.    Estimated body mass index is 25.06 kg/(m^2) as calculated from the following:    Height as of this encounter: 5' 4\" (1.626 m).    Weight as of this encounter: 146 lb (66.2 kg).   Weight management plan: Discussed healthy diet and exercise guidelines and patient will follow up in 12 months in clinic to re-evaluate.    Counseling Resources:  ATP IV Guidelines  Pooled Cohorts Equation Calculator  Breast Cancer Risk Calculator  FRAX Risk Assessment  ICSI Preventive Guidelines  Dietary Guidelines for Americans, 2010  [employer] MyPlate  ASA Prophylaxis  Lung CA Screening    Asmita Bhatia MD MPH    [facility]      Injectable Influenza Immunization Documentation    1.  Are you sick today? (Fever of 100.5 or higher on the day of the clinic)   No    2.  Have you ever had Guillain-[place] Syndrome within 6 weeks of an influenza vaccionation?  No    3. Do you have a life-threatening allergy to eggs?  No    4. Do you have a life-threatening allergy to a component of the vaccine? May include antibiotics, gelatin or latex.  No     5. Have you ever had a reaction to a dose of flu vaccine that needed immediate medical attention?  No     Form completed by Melissa THOMSON        "

## 2017-09-12 NOTE — PATIENT INSTRUCTIONS
Based on your medical history and these are the current health maintenance or preventive care services that you are due for (some may have been done at this visit)  Health Maintenance Due   Topic Date Due     INFLUENZA VACCINE (SYSTEM ASSIGNED)  09/01/2017         At [facility], we strive to deliver an exceptional experience to you, every time we see you.    If you receive a survey in the mail, please send us back your thoughts. We really do value your feedback.    Your care team's suggested websites for health information:  Www."[employer] [employer]".org : Up to date and easily searchable information on multiple topics.  Www.medlineplus.gov : medication info, interactive tutorials, watch real surgeries online  Www.familydoctor.org : good info from the Academy of Family Physicians  Www.cdc.gov : public health info, travel advisories, epidemics (H1N1)  Www.aap.org : children's health info, normal development, vaccinations  Www.health.[facility].[place].us : [place] dept of health, public health issues in [place], N1N1    How to contact your care team:   Team Radha/Spirit [phone]         Pharmacy [phone]    Dr. Lucas, Blossom Villa PA-C, Dr. Bhatia, Kim KOLB CNP, Moraima Godoy PA-C, Dr. Richards, and KENNEDI Muir CNP    Team RNs: Gena Jett      Clinic hours  M-Th 7 am-7 pm   Fri 7 am-5 pm.   Urgent care M-F 11 am-9 pm,   Sat/Sun 9 am-5 pm.  Pharmacy M-Th 8 am-8 pm Fri 8 am-6 pm  Sat/Sun 9 am-5 pm.     All password changes, disabled accounts, or ID changes in [employer]/MyHealth will be done by our Access Services Department.    If you need help with your account or password, call: [phone]. Clinic staff no longer has the ability to change passwords.     Preventive Health Recommendations  Female Ages 40 to 49    Yearly exam:     See your health care provider every year in order to  1. Review health changes.   2. Discuss preventive care.    3. Review your medicines if your doctor  prescribed any.      Get a Pap test every three years (unless you have an abnormal result and your provider advises testing more often).      If you get Pap tests with HPV test, you only need to test every 5 years, unless you have an abnormal result. You do not need a Pap test if your uterus was removed (hysterectomy) and you have not had cancer.      You should be tested each year for STDs (sexually transmitted diseases), if you're at risk.       Ask your doctor if you should have a mammogram.      Have a colonoscopy (test for colon cancer) if someone in your family has had colon cancer or polyps before age 50.       Have a cholesterol test every 5 years.       Have a diabetes test (fasting glucose) after age 45. If you are at risk for diabetes, you should have this test every 3 years.    Shots: Get a flu shot each year. Get a tetanus shot every 10 years.     Nutrition:     Eat at least 5 servings of fruits and vegetables each day.    Eat whole-grain bread, whole-wheat pasta and brown rice instead of white grains and rice.    Talk to your provider about Calcium and Vitamin D.     Lifestyle    Exercise at least 150 minutes a week (an average of 30 minutes a day, 5 days a week). This will help you control your weight and prevent disease.    Limit alcohol to one drink per day.    No smoking.     Wear sunscreen to prevent skin cancer.    See your dentist every six months for an exam and cleaning.

## 2017-09-22 ENCOUNTER — TELEPHONE (OUTPATIENT)
Dept: FAMILY MEDICINE | Facility: CLINIC | Age: 47
End: 2017-09-22

## 2017-09-22 NOTE — TELEPHONE ENCOUNTER
The referral you sent patient insurance needs some corrections    The dates are wrong, they need to be 6/19/17 as that was her date of service    Call patient if any questions and ok to leave message    [phone]

## 2017-09-22 NOTE — TELEPHONE ENCOUNTER
Patient's original dentist referred patient to the [facility],  Dentist is not in business anymore and wanted Dr Bhatia to  Back date a referral for this specialist. I explained that we can  Not do this. Patient understands.  Daniela Vera MA/  For Teams Ana

## 2017-10-13 ENCOUNTER — OFFICE VISIT (OUTPATIENT)
Dept: OBGYN | Facility: CLINIC | Age: 47
End: 2017-10-13
Payer: COMMERCIAL

## 2017-10-13 VITALS
DIASTOLIC BLOOD PRESSURE: 74 MMHG | HEART RATE: 76 BPM | WEIGHT: 147.8 LBS | SYSTOLIC BLOOD PRESSURE: 109 MMHG | BODY MASS INDEX: 25.37 KG/M2

## 2017-10-13 DIAGNOSIS — R10.2 PELVIC PAIN IN FEMALE: Primary | ICD-10-CM

## 2017-10-13 LAB
ALBUMIN UR-MCNC: NEGATIVE MG/DL
APPEARANCE UR: CLEAR
BACTERIA #/AREA URNS HPF: ABNORMAL /HPF
BILIRUB UR QL STRIP: NEGATIVE
COLOR UR AUTO: YELLOW
GLUCOSE UR STRIP-MCNC: NEGATIVE MG/DL
HGB UR QL STRIP: NEGATIVE
KETONES UR STRIP-MCNC: NEGATIVE MG/DL
LEUKOCYTE ESTERASE UR QL STRIP: NEGATIVE
NITRATE UR QL: NEGATIVE
NON-SQ EPI CELLS #/AREA URNS LPF: ABNORMAL /LPF
PH UR STRIP: 6.5 PH (ref 5–7)
RBC #/AREA URNS AUTO: ABNORMAL /HPF
SOURCE: ABNORMAL
SP GR UR STRIP: 1.01 (ref 1–1.03)
UROBILINOGEN UR STRIP-MCNC: NORMAL MG/DL (ref 0–2)
WBC #/AREA URNS AUTO: ABNORMAL /HPF

## 2017-10-13 PROCEDURE — 99213 OFFICE O/P EST LOW 20 MIN: CPT | Performed by: OBSTETRICS & GYNECOLOGY

## 2017-10-13 PROCEDURE — 81001 URINALYSIS AUTO W/SCOPE: CPT | Performed by: OBSTETRICS & GYNECOLOGY

## 2017-10-13 ASSESSMENT — PAIN SCALES - GENERAL: PAINLEVEL: SEVERE PAIN (6)
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
## 2017-10-13 NOTE — PROGRESS NOTES
OB/GYN   10/13/2017      NAME:  Lashonda Rivera  PCP:  Asmita Bhatia  MRN:  [id number]      Impression / Plan     47 year old  with:      ICD-10-CM    1. Pelvic pain in female R10.2 US Pelvic Complete with Transvaginal     UA with Microscopic reflex to Culture (Donya Vincent; [facility])       Discussed possible etiologies for pelvic pain.  Agree she may have a right ovarian cyst.  We are unlikely to be able to do that this late in the day, so she will schedule it for when she returns.    I will contact her with the results of the UA.  She will use the [facility] in [place] [address]    She will let me know if her symptoms worsen or do not improve.  She may try stool softeners or laxative as needed to see if constipation is the issue.        Chief Complaint     Chief Complaint   Patient presents with     Abdominal Pain       HPI     Lashonda Rivera is a  47 year old female who is seen for abdominal pain.  Patient has seen Dr. Marcus in the past for ovarian cysts.      Pain started , about 5 days ago.  She states the pain came in the middle of the night and woke her up.  Pain was constant.  Today is the first day that is starting to get better.  The pain is like cramping pain, like a period.    She has regular BM, but she feels like she might be constipated.  She has smaller firmer stools recently.    No pain with urination.  She gets some pain if she holds it.  That is the same kind of pain that she has been having.      She is leaving for [place] Saturday and she will be there until Tuesday.     Patient's last menstrual period was 06/10/2013.  Patient had a hysterectomy for fibroids.      Date of Last Pap Smear:   Lab Results   Component Value Date    PAP NIL 2012       Problem List     Patient Active Problem List    Diagnosis Date Noted     TBI (traumatic brain injury), without LOC, sequela 2017     Priority: Medium     Seasonal allergic rhinitis, unspecified allergic  rhinitis trigger 10/12/2016     Priority: Medium     Fibroadenoma, right 06/09/2016     Priority: Medium     Adjustment disorder with depressed mood 01/19/2016     Priority: Medium     Migraine headache 10/02/2012     Priority: Medium     Tension headache 01/17/2011     Priority: Medium     (Problem list name updated by automated process. Provider to review and confirm.)       CARDIOVASCULAR SCREENING; LDL GOAL LESS THAN 160 10/31/2010     Priority: Medium       Medications     Current Outpatient Prescriptions   Medication     albuterol (PROAIR HFA/PROVENTIL HFA/VENTOLIN HFA) 108 (90 BASE) MCG/ACT Inhaler     fluticasone (FLONASE) 50 MCG/ACT nasal spray     tiZANidine (ZANAFLEX) 2 MG tablet     Probiotic Product (PROBIOTIC DAILY PO)     montelukast (SINGULAIR) 10 MG tablet     nortriptyline (PAMELOR) 10 MG capsule     Ascorbic Acid (VITAMIN C PO)     VITAMIN D, CHOLECALCIFEROL, PO     METOCLOPRAMIDE HCL PO     SUMAtriptan (IMITREX) 100 MG tablet     cyclobenzaprine (FLEXERIL) 5 MG tablet     No current facility-administered medications for this visit.         Allergies     Allergies   Allergen Reactions     Vicodin [Hydrocodone-Acetaminophen]      itchy       Past Medical/Surgical History     Past Medical History:   Diagnosis Date     Hypothyroidism 2005    Now resolved     Leiomyoma of uterus, unspecified 2013    resolved     Menorrhagia 2000    resolved     Recurrent UTI 2012     Vitamin B 12 deficiency 2005    Resolved       Past Surgical History:   Procedure Laterality Date     BIOPSY BREAST Right 6/22/2016    Procedure: BIOPSY BREAST;  Surgeon: Kasier Roy MD;  Location: [facility] TOTAL ABDOM HYSTERECTOMY  06/11/2013    benign fibroids     C TREAT ECTOPIC PREG,RMV TUBE/OVARY  1995     HYSTERECTOMY, PAP NO LONGER INDICATED       SKIN GRAFT, EACH ADDN 100SQCM  1977    buttocks to left  foot     SURGICAL HISTORY OF -   2004    L wrist tendon        Social History     Social History     Social History      Marital status:      Spouse name: Adam     Number of children: 2     Years of education: N/A     Occupational History     [employer]     Social History Main Topics     Smoking status: Never Smoker     Smokeless tobacco: Never Used     Alcohol use No     Drug use: No     Sexual activity: Yes     Partners: Male     Birth control/ protection: Surgical     Other Topics Concern     Parent/Sibling W/ Cabg, Mi Or Angioplasty Before 65f 55m? Yes     Social History Narrative       Family History      Family History   Problem Relation Age of Onset     DIABETES Mother      DIABETES Maternal Grandmother      Hypertension Maternal Grandmother      Arthritis Maternal Grandmother      CANCER Maternal Grandmother      bladder cancer/cervix     CANCER Maternal Grandfather      bone     Asthma Son      Unknown/Adopted Paternal Grandfather      Unknown/Adopted Paternal Grandmother      Cervical Cancer Maternal Aunt        ROS     A /GI organ review of systems was asked and the pertinent positives and negatives are listed in the HPI.    Physical Exam   Vitals: /74 (BP Location: Right arm, Patient Position: Chair, Cuff Size: Adult Regular)  Pulse 76  Wt 67 kg (147 lb 12.8 oz)  LMP 06/10/2013  BMI 25.37 kg/m2    General: Comfortable, no obvious distress, normal  body habitus  Psych: Alert and orientated x 3. Appropriate affect, good insight.   Abdominal:  Soft,  nondistended.  No rebound or guarding.  Some tenderness in the right inguinal area.  No suprapubic tenderness.  No other areas of tenderness to palpation.    :  deferred  Extremities: No peripheral edema, nontender       20 minutes was spent with patient, more than 50% counseling and coordinating care    Melly Lieberman MD

## 2017-10-13 NOTE — NURSING NOTE
"Chief Complaint   Patient presents with     Abdominal Pain       Initial /74 (BP Location: Right arm, Patient Position: Chair, Cuff Size: Adult Regular)  Pulse 76  Wt 67 kg (147 lb 12.8 oz)  LMP 06/10/2013  BMI 25.37 kg/m2 Estimated body mass index is 25.37 kg/(m^2) as calculated from the following:    Height as of 17: 1.626 m (5' 4\").    Weight as of this encounter: 67 kg (147 lb 12.8 oz).  BP completed using cuff size: regular        The following HM Due: NONE      The following patient reported/Care Every where data was sent to:  P ABSTRACT QUALITY INITIATIVES [51835]       N/a    Traci Ureña, [facility]  2017           "

## 2017-10-18 ENCOUNTER — RADIANT APPOINTMENT (OUTPATIENT)
Dept: ULTRASOUND IMAGING | Facility: CLINIC | Age: 47
End: 2017-10-18
Attending: OBSTETRICS & GYNECOLOGY
Payer: COMMERCIAL

## 2017-10-18 DIAGNOSIS — R10.2 PELVIC PAIN IN FEMALE: ICD-10-CM

## 2017-10-18 PROCEDURE — 76856 US EXAM PELVIC COMPLETE: CPT | Performed by: RADIOLOGY

## 2017-10-18 PROCEDURE — 76830 TRANSVAGINAL US NON-OB: CPT | Performed by: RADIOLOGY

## 2017-10-20 ENCOUNTER — OFFICE VISIT (OUTPATIENT)
Dept: OBGYN | Facility: CLINIC | Age: 47
End: 2017-10-20
Payer: COMMERCIAL

## 2017-10-20 VITALS
BODY MASS INDEX: 24.89 KG/M2 | DIASTOLIC BLOOD PRESSURE: 74 MMHG | HEART RATE: 77 BPM | TEMPERATURE: 97.3 F | SYSTOLIC BLOOD PRESSURE: 114 MMHG | WEIGHT: 145 LBS

## 2017-10-20 DIAGNOSIS — R10.2 FEMALE PELVIC PAIN: Primary | ICD-10-CM

## 2017-10-20 DIAGNOSIS — N94.9 ADNEXAL CYST: ICD-10-CM

## 2017-10-20 PROCEDURE — 99214 OFFICE O/P EST MOD 30 MIN: CPT | Performed by: OBSTETRICS & GYNECOLOGY
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
## 2017-10-20 NOTE — NURSING NOTE
"Chief Complaint   Patient presents with     Results     Follow-up abdominal pain-Recent US done       Initial /74 (BP Location: Left arm, Patient Position: Chair, Cuff Size: Adult Regular)  Pulse 77  Temp 97.3  F (36.3  C) (Oral)  Wt 145 lb (65.8 kg)  LMP 06/10/2013  Breastfeeding? No  BMI 24.89 kg/m2 Estimated body mass index is 24.89 kg/(m^2) as calculated from the following:    Height as of 9/12/17: 5' 4\" (1.626 m).    Weight as of this encounter: 145 lb (65.8 kg).  Medication Reconciliation: complete   Duc Sanchez CMA      "

## 2017-10-21 PROBLEM — N94.9 ADNEXAL CYST: Status: ACTIVE | Noted: 2017-10-21

## 2017-10-21 NOTE — PROGRESS NOTES
OB-GYN Problem-Oriented Visit or Consultation      Lashonda Rivera is a 47 year old year old P 2 who presents with a chief complaint of pelvic pain and right adnexal cyst.  Referred by self.  Patient's last menstrual period was 06/10/2013.    HPI:     See prior notes. S/P total abdominal hysterectomy and prior ectopic. Recent spontaneous RLQ pain. Reviewed Dr. Lieberman's evaluation. Pain in past 2 yrs is overall infrequent. Repeat u/s showed a non-specific irregular 6 cm primarily cystic right adnexal mass. Pelvic images reviewed and compared to past studies this appears to be the same, relatively stable, finding. It appears to be adjacent to the right ovary, tubal or paratubal in nature. There is also a more apparent left hydrosalpinx. Pain has resolved. Some constipation tendency also. Active exercise but did not seem to precipitate pain. NSA due to 's medical problems. Here with mother. Plans to move to [place] soon.     Past medical, obstetrical, surgical, family and social history reviewed and as noted or updated in chart.     Allergies, meds and supplements are as noted or updated in chart.      ROS:   Systems reviewed include:  constitutional, gastrointestinal, genitourinary, musculoskeletal, integumentary, psychological, hematologic/lymphatic and endocrine.    These systems were negative for significant symptoms except for the following additional: none; see HPI.    EXAM:  VS as noted.   Exam not repeated at this time.    Assessment:   Encounter Diagnoses   Name Primary?     Female pelvic pain Yes     Adnexal cyst      Plan and Recommendations: I reviewed the condition, causes, differential diagnosis, prognosis, evaluation and management considerations and options.  Questions answered and information given.  See orders.  Non-specific chronic right adnexal mass, possibly complex hydrosalpinx or some element of endometriosis. Based on course to date, recommend continued symptomatic treatment. A follow-up  pelvic u/s in 3-6 mo may be done. Surgical removal alternative discussed and declined.   Total encounter time (physician together with patient) = 25min. Direct counseling, education and care coordination time (physician together with patient) = 20min.     A/P:  Lashonda was seen today for results.    Diagnoses and all orders for this visit:    Female pelvic pain  -     US Pelvic Complete with Transvaginal; Future    Adnexal cyst  -     US Pelvic Complete with Transvaginal; Future        Pardeep Marcus MD

## 2017-12-13 ENCOUNTER — RADIANT APPOINTMENT (OUTPATIENT)
Dept: ULTRASOUND IMAGING | Facility: CLINIC | Age: 47
End: 2017-12-13
Attending: OBSTETRICS & GYNECOLOGY
Payer: COMMERCIAL

## 2017-12-13 DIAGNOSIS — N94.9 ADNEXAL CYST: ICD-10-CM

## 2017-12-13 DIAGNOSIS — R10.2 FEMALE PELVIC PAIN: ICD-10-CM

## 2017-12-13 PROCEDURE — 76830 TRANSVAGINAL US NON-OB: CPT

## 2017-12-13 PROCEDURE — 76856 US EXAM PELVIC COMPLETE: CPT

## 2017-12-14 ENCOUNTER — MYC MEDICAL ADVICE (OUTPATIENT)
Dept: OBGYN | Facility: CLINIC | Age: 47
End: 2017-12-14

## 2017-12-15 NOTE — TELEPHONE ENCOUNTER
"  Please contact the patient to get more information.  I am not sure what her questions is.     The patient saw Dr. Marcus 10/20/17.  He ordered a follow up ultrasound, which was done 12/13/17.  His result note \"The same non-specific complex fluid filled area is seen near the right tube and ovary on this follow-up. This is not changing and appears slightly smaller. Chronic and stable finding. No need for any intervention now. Advise routine care and observe for any progressive symptoms.\"  "

## 2017-12-15 NOTE — TELEPHONE ENCOUNTER
Phone call to patient. Patient stated she was able to see Dr. Marcus's message on [employer] but apologized as she misunderstood the message. She stated she thought that was the radiologist's recommendations. Review orders per Dr. Marcus. Patient verbalized understanding and again apologized for her confusion. Patient was very appreciative of follow up appointment. Taylor Neff RN, BAN

## 2018-01-31 ENCOUNTER — TRANSFERRED RECORDS (OUTPATIENT)
Dept: HEALTH INFORMATION MANAGEMENT | Facility: CLINIC | Age: 48
End: 2018-01-31

## 2018-03-26 ENCOUNTER — OFFICE VISIT (OUTPATIENT)
Dept: FAMILY MEDICINE | Facility: CLINIC | Age: 48
End: 2018-03-26
Payer: COMMERCIAL

## 2018-03-26 VITALS
SYSTOLIC BLOOD PRESSURE: 124 MMHG | DIASTOLIC BLOOD PRESSURE: 73 MMHG | WEIGHT: 148 LBS | TEMPERATURE: 98.1 F | OXYGEN SATURATION: 100 % | BODY MASS INDEX: 26.22 KG/M2 | HEIGHT: 63 IN | HEART RATE: 81 BPM

## 2018-03-26 DIAGNOSIS — H10.32 ACUTE CONJUNCTIVITIS OF LEFT EYE, UNSPECIFIED ACUTE CONJUNCTIVITIS TYPE: Primary | ICD-10-CM

## 2018-03-26 PROCEDURE — 99213 OFFICE O/P EST LOW 20 MIN: CPT | Performed by: PHYSICIAN ASSISTANT

## 2018-03-26 RX ORDER — VITAMIN E (DL,TOCOPHERYL ACET) 180 MG
CAPSULE ORAL
COMMUNITY

## 2018-03-26 RX ORDER — OFLOXACIN 3 MG/ML
1 SOLUTION/ DROPS OPHTHALMIC EVERY 4 HOURS
Qty: 3 ML | Refills: 0 | Status: SHIPPED | OUTPATIENT
Start: 2018-03-26 | End: 2018-04-02

## 2018-03-26 ASSESSMENT — PAIN SCALES - GENERAL: PAINLEVEL: NO PAIN (0)

## 2018-03-26 NOTE — PROGRESS NOTES
SUBJECTIVE:   Lashonda Rivera is a 47 year old female who presents to clinic today for the following health issues:    Eye(s) Problem  Onset: 1 day    Description:   Location: left  Pain: no  Redness: no    Accompanying Signs & Symptoms:  Discharge/mattering: YES  Swelling: no  Visual changes: no  Fever: no  Nasal Congestion: no  Bothered by bright lights: no    History:   Trauma: no   Foreign body exposure: YES- possible    Precipitating factors:   Wearing contacts: YES    Alleviating factors:  Improved by:     Therapies Tried and outcome: none   normally wears contacts           Allergies   Allergen Reactions     Vicodin [Hydrocodone-Acetaminophen]      itchy       Past Medical History:   Diagnosis Date     Breast fibroadenoma, right 2016    excised     H/O: hysterectomy 2013     Hypothyroidism 2005    Now resolved     Leiomyoma of uterus, unspecified 2013    resolved     Menorrhagia 2000    resolved     Migraine headache 10/2/2012     Recurrent UTI 2012     TBI (traumatic brain injury), without LOC, sequela 9/12/2017     Vitamin B 12 deficiency 2005    Resolved         Current Outpatient Prescriptions on File Prior to Visit:  albuterol (PROAIR HFA/PROVENTIL HFA/VENTOLIN HFA) 108 (90 BASE) MCG/ACT Inhaler Inhale 2 puffs into the lungs every 4 hours as needed   fluticasone (FLONASE) 50 MCG/ACT nasal spray Spray 1-2 sprays into both nostrils daily   SUMAtriptan (IMITREX) 100 MG tablet Take 1 tablet (100 mg) by mouth at onset of headache for migraine May repeat in 2 hours if needed: max 2/day   cyclobenzaprine (FLEXERIL) 5 MG tablet Take 1 tablet (5 mg) by mouth 2 times daily as needed for muscle spasms   Probiotic Product (PROBIOTIC DAILY PO) Take 2 tablets by mouth daily      No current facility-administered medications on file prior to visit.     Social History   Substance Use Topics     Smoking status: Never Smoker     Smokeless tobacco: Never Used     Alcohol use No       ROS:  General: negative for  "fever  EYE: as above  ENT: as above    OBJECTIVE:  /73 (BP Location: Left arm, Patient Position: Chair, Cuff Size: Adult Regular)  Pulse 81  Temp 98.1  F (36.7  C) (Oral)  Ht 5' 2.75\" (1.594 m)  Wt 148 lb (67.1 kg)  LMP 06/10/2013  SpO2 100%  BMI 26.43 kg/m2   General:   awake, alert, and cooperative.  NAD.   Head: Normocephalic, atraumatic.  Eyes:left  Conjunctiva clear,no exudates EOMI, PERRLA, no FB,   Lungs: No respiratory distress  Neuro: Alert and oriented - normal speech.    ASSESSMENT:    ICD-10-CM    1. Acute conjunctivitis of left eye, unspecified acute conjunctivitis type H10.32 ofloxacin (OCUFLOX) 0.3 % ophthalmic solution         PLAN: wear glasses intil ABXs completed.  Advised about symptoms which might herald more serious problems.              "

## 2018-03-26 NOTE — PATIENT INSTRUCTIONS
At [facility], we strive to deliver an exceptional experience to you, every time we see you.  If you receive a survey in the mail, please send us back your thoughts. We really do value your feedback.    Based on your medical history, these are the current health maintenance/preventive care services that you are due for (some may have been done at this visit.)  Health Maintenance Due   Topic Date Due     PAP SCREENING Q3 YR (SYSTEM ASSIGNED)  09/04/2015         Suggested websites for health information:  Www.[employer]down.[employer] : Up to date and easily searchable information on multiple topics.  Www.medlineplus.gov : medication info, interactive tutorials, watch real surgeries online  Www.familydoctor.org : good info from the Academy of Family Physicians  Www.cdc.gov : public health info, travel advisories, epidemics (H1N1)  Www.aap.org : children's health info, normal development, vaccinations  Www.health.[facility].[place].us : [place] dept of health, public health issues in [place], N1N1    Your care team:                            Family Medicine Internal Medicine   MD Hussain Albright MD Shantel Branch-Fleming, MD Katya Georgiev PA-C Nam Ho, MD Pediatrics   Octavio Tabares, PABARTOLO Valdez, CNP Kim Cedeno APRN CNP   MD Tiffani Gordon MD Deborah Mielke, MD Kim Thein, APRN [facility]      Clinic hours: Monday - Thursday 7 am-7 pm; Fridays 7 am-5 pm.   Urgent care: Monday - Friday 11 am-9 pm; Saturday and Sunday 9 am-5 pm.  Pharmacy : Monday -Thursday 8 am-8 pm; Friday 8 am-6 pm; Saturday and Sunday 9 am-5 pm.     Clinic: [phone]   Pharmacy: [phone]  Return to clinic or Emergency Department for fevers, vision loss, or worsening symptoms.    Conjunctivitis Caused by Infection  Infections are caused by viruses or bacteria. Treatment includes keeping your eyes and hands clean. Your doctor may prescribe eyedrops, and tell you to stay home from work or school if you re  contagious. Untreated infections can be serious, so it s important that a doctor diagnoses you    Viral Infections  A cold, flu, or other virus can spread to the eyes. This causes a watery discharge. The eyes may burn or itch and get red. The eyelids may also be puffy and sore.  Treating the infections. Most viral infections go away on their own. Artificial tears, over the counter antihistamine eye drops, and warm compresses can relieve symptoms. Your doctor may also prescribe eyedrops. A viral infection can be very contagious and spreads quickly. To prevent this, wash your hands often. Use a separate tissue to wipe each eye. Don t touch your eyes or share bedding or towels.  Bacterial Infections  Bacterial infections often occur in one eye. There may be a watery or a thick discharge from the eye. These infections can cause serious damage to the eye if not treated promptly.  Treating the infections. Your doctor may prescribe eyedrops or ointment to kill the bacteria. Warm compresses can help keep the eyelids clean. To keep the bacteria from spreading, wash your hands often. Use a separate tissue to wipe each eye. Don t touch your eyes or share bedding or towels.    4528-2576 [facility], [address] [place] [address], [place] [address]. All rights reserved. This information is not intended as a substitute for professional medical care. Always follow your healthcare professional's instructions.
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
## 2018-04-03 ENCOUNTER — TELEPHONE (OUTPATIENT)
Dept: FAMILY MEDICINE | Facility: CLINIC | Age: 48
End: 2018-04-03

## 2018-04-03 NOTE — TELEPHONE ENCOUNTER
I have not seen Lashonda since 09/2017. She will need an office visit for me to address this referral. She can also get this referral from her Neurologist if she prefers.  Thanks,  Asmita Bhatia MD MPH

## 2018-04-03 NOTE — TELEPHONE ENCOUNTER
Reason for Call:  Other Referral    Detailed comments: Pt is scheduled to come to [facility] and was informed by her insurance that she needs a referral to be faxed to her insurance provider to fax # [phone].  She will be coming in for occupational therapy     Phone Number [facility] can be reached at: Other phone number:  [phone]    Best Time: anytime    Can we leave a detailed message on this number? YES    Call taken on 4/3/2018 at 3:11 PM by Anastacio Love

## 2018-04-04 ENCOUNTER — TELEPHONE (OUTPATIENT)
Dept: FAMILY MEDICINE | Facility: CLINIC | Age: 48
End: 2018-04-04

## 2018-04-04 NOTE — TELEPHONE ENCOUNTER
Called and spoke to patient and gave her Dr Bhatia's message.  Patient states she is having troubles getting the referral from  The Neurologist. Scheduled patient for an appointment with   Dr Bhatia on 4/6/18 at 4:00 pm.  Daniela Vera MA/  For [employer] and Radha

## 2018-04-04 NOTE — TELEPHONE ENCOUNTER
...Reason for Call:  Other     Detailed comments: Patient said she need a referral for [facility] occupational therapy. In order to continue therapy please call patient to get more information regarding referral    Phone Number Patient can be reached at: Home number on file [phone] (home)    Best Time: anytime    Can we leave a detailed message on this number? YES    Call taken on 4/4/2018 at 10:09 AM by David Garcia

## 2018-04-04 NOTE — TELEPHONE ENCOUNTER
Called and spoke to [facility] and gave her Dr Bhatia's message.  They will contact the patient.  Daniela Vera MA/  For [employer] Radha

## 2018-04-04 NOTE — TELEPHONE ENCOUNTER
April 4, 2018   Me        9:44 AM   Note      Called and spoke to [facility] and gave her Dr Bhatia's message.  They will contact the patient.  Daniela Vera MA/  For Teams Ana

## 2018-04-04 NOTE — TELEPHONE ENCOUNTER
Asmita Bhatia MD        5:37 PM   Note      I have not seen Lashonda since 09/2017. She will need an office visit for me to address this referral. She can also get this referral from her Neurologist if she prefers.  Thanks,  Asmita Bhatia MD MPH

## 2018-04-06 ENCOUNTER — OFFICE VISIT (OUTPATIENT)
Dept: FAMILY MEDICINE | Facility: CLINIC | Age: 48
End: 2018-04-06
Payer: COMMERCIAL

## 2018-04-06 VITALS
OXYGEN SATURATION: 100 % | HEIGHT: 63 IN | BODY MASS INDEX: 26.05 KG/M2 | SYSTOLIC BLOOD PRESSURE: 120 MMHG | DIASTOLIC BLOOD PRESSURE: 70 MMHG | HEART RATE: 84 BPM | TEMPERATURE: 96.8 F | WEIGHT: 147 LBS

## 2018-04-06 DIAGNOSIS — G31.84 MILD COGNITIVE IMPAIRMENT: ICD-10-CM

## 2018-04-06 DIAGNOSIS — M54.42 BILATERAL LOW BACK PAIN WITH LEFT-SIDED SCIATICA, UNSPECIFIED CHRONICITY: ICD-10-CM

## 2018-04-06 DIAGNOSIS — S06.9X0S TRAUMATIC BRAIN INJURY, WITHOUT LOSS OF CONSCIOUSNESS, SEQUELA (H): Primary | ICD-10-CM

## 2018-04-06 PROCEDURE — 99212 OFFICE O/P EST SF 10 MIN: CPT | Performed by: PREVENTIVE MEDICINE

## 2018-04-06 ASSESSMENT — PAIN SCALES - GENERAL: PAINLEVEL: NO PAIN (0)

## 2018-04-06 NOTE — PATIENT INSTRUCTIONS
At [facility], we strive to deliver an exceptional experience to you, every time we see you.  If you receive a survey in the mail, please send us back your thoughts. We really do value your feedback.    Based on your medical history, these are the current health maintenance/preventive care services that you are due for (some may have been done at this visit.)  There are no preventive care reminders to display for this patient.      Suggested websites for health information:  Www.[place].org : Up to date and easily searchable information on multiple topics.  Www.medlineplus.gov : medication info, interactive tutorials, watch real surgeries online  Www.familydoctor.org : good info from the Academy of Family Physicians  Www.cdc.gov : public health info, travel advisories, epidemics (H1N1)  Www.aap.org : children's health info, normal development, vaccinations  Www.health.[facility].[place].us : [place] dept of health, public health issues in [place], N1N1    Your care team:                            Family Medicine Internal Medicine   MD Hussain Albright MD Shantel Branch-Fleming, MD Katya Georgiev PA-C Megan Hill, APRN CNP    Ralph Evans MD Pediatrics   Octavio Tabares, PABARTOLO Valdez, CNP Kim Cedeno APRN CNP   MD Tiffani Gordon MD Deborah Mielke, MD Kim Thein, APRN CNP      Clinic hours: Monday - Thursday 7 am-7 pm; Fridays 7 am-5 pm.   Urgent care: Monday - Friday 11 am-9 pm; Saturday and Sunday 9 am-5 pm.  Pharmacy : Monday -Thursday 8 am-8 pm; Friday 8 am-6 pm; Saturday and Sunday 9 am-5 pm.     Clinic: [phone]   Pharmacy: [phone]
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
## 2018-04-06 NOTE — PROGRESS NOTES
SUBJECTIVE:   Lashonda Rivera is a 47 year old female who presents to clinic today for the following health issues:      Referral     Patient is being closely followed by the [facility] for mild cognitive impairment and history of traumatic brain injury. This has impacted work. The Neurologist Dr. Georges had referred her to [facility] for therapy. However, it seems the insurance plan wants a referral from the Primary Care Provider. This is the main reason for presenting today. No other issues.       Problem list and histories reviewed & adjusted, as indicated.  Additional history: as documented    Patient Active Problem List   Diagnosis     CARDIOVASCULAR SCREENING; LDL GOAL LESS THAN 160     Tension headache     Migraine headache     Adjustment disorder with depressed mood     Seasonal allergic rhinitis, unspecified allergic rhinitis trigger     TBI (traumatic brain injury), without LOC, sequela     Adnexal cyst     Past Surgical History:   Procedure Laterality Date     BIOPSY BREAST Right 6/22/2016    Procedure: BIOPSY BREAST;  Surgeon: Kaiser Roy MD;  Location: [facility] TOTAL ABDOM HYSTERECTOMY  06/11/2013    benign fibroids     C TREAT ECTOPIC PREG,RMV TUBE/OVARY  1995     HYSTERECTOMY, PAP NO LONGER INDICATED       SKIN GRAFT, EACH ADDN 100SQCM  1977    buttocks to left  foot     SURGICAL HISTORY OF -   2004    L wrist tendon       Social History   Substance Use Topics     Smoking status: Never Smoker     Smokeless tobacco: Never Used     Alcohol use No     Family History   Problem Relation Age of Onset     DIABETES Mother      DIABETES Maternal Grandmother      Hypertension Maternal Grandmother      Arthritis Maternal Grandmother      CANCER Maternal Grandmother      bladder cancer/cervix     CANCER Maternal Grandfather      bone     Asthma Son      Unknown/Adopted Paternal Grandfather      Unknown/Adopted Paternal Grandmother      Cervical Cancer  "Maternal Aunt          Current Outpatient Prescriptions   Medication Sig Dispense Refill     Multiple Vitamins-Minerals (HAIR SKIN NAILS) CAPS        albuterol (PROAIR HFA/PROVENTIL HFA/VENTOLIN HFA) 108 (90 BASE) MCG/ACT Inhaler Inhale 2 puffs into the lungs every 4 hours as needed 1 Inhaler 0     fluticasone (FLONASE) 50 MCG/ACT nasal spray Spray 1-2 sprays into both nostrils daily 16 g 0     SUMAtriptan (IMITREX) 100 MG tablet Take 1 tablet (100 mg) by mouth at onset of headache for migraine May repeat in 2 hours if needed: max 2/day 18 tablet 1     cyclobenzaprine (FLEXERIL) 5 MG tablet Take 1 tablet (5 mg) by mouth 2 times daily as needed for muscle spasms 30 tablet 1     Probiotic Product (PROBIOTIC DAILY PO) Take 2 tablets by mouth daily        Allergies   Allergen Reactions     Vicodin [Hydrocodone-Acetaminophen]      itchy     BP Readings from Last 3 Encounters:   04/06/18 120/70   03/26/18 124/73   10/20/17 114/74    Wt Readings from Last 3 Encounters:   04/06/18 147 lb (66.7 kg)   03/26/18 148 lb (67.1 kg)   10/20/17 145 lb (65.8 kg)                  Labs reviewed in EPIC    Reviewed and updated as needed this visit by clinical staff  Allergies  Meds       Reviewed and updated as needed this visit by Provider         ROS:  Constitutional, HEENT, cardiovascular, pulmonary, gi and gu systems are negative, except as otherwise noted.    OBJECTIVE:                                                    /70  Pulse 84  Temp 96.8  F (36  C) (Oral)  Ht 5' 2.75\" (1.594 m)  Wt 147 lb (66.7 kg)  LMP 06/10/2013  SpO2 100%  Breastfeeding? No  BMI 26.25 kg/m2  Body mass index is 26.25 kg/(m^2).  GENERAL APPEARANCE: healthy, alert and no distress  EYES: Eyes grossly normal to inspection and conjunctivae and sclerae normal  SKIN: no suspicious lesions or rashes  NEURO: Normal strength and tone, mentation intact and speech normal  PSYCH: mentation appears normal and affect normal/bright    Diagnostic test " results:  Diagnostic Test Results:  No results found for this or any previous visit (from the past 24 hour(s)).     ASSESSMENT/PLAN:                                                    1. Traumatic brain injury, without loss of consciousness, sequela (H)  -Referral provided   -This was faxed to [employer] [facility] and [facility]   - OCCUPATIONAL THERAPY REFERRAL    2. Mild cognitive impairment  -Followed by Neurology   - OCCUPATIONAL THERAPY REFERRAL    3. Bilateral low back pain with left-sided sciatica, unspecified chronicity  - OCCUPATIONAL THERAPY REFERRAL      Follow up with Provider - as needed      Asmita Bhatia MD MPH    [facility]

## 2018-06-11 ENCOUNTER — TRANSFERRED RECORDS (OUTPATIENT)
Dept: HEALTH INFORMATION MANAGEMENT | Facility: CLINIC | Age: 48
End: 2018-06-11

## 2018-06-15 ENCOUNTER — TELEPHONE (OUTPATIENT)
Dept: FAMILY MEDICINE | Facility: CLINIC | Age: 48
End: 2018-06-15

## 2018-06-15 ENCOUNTER — VIRTUAL VISIT (OUTPATIENT)
Dept: FAMILY MEDICINE | Facility: CLINIC | Age: 48
End: 2018-06-15
Payer: COMMERCIAL

## 2018-06-15 DIAGNOSIS — R05.9 COUGH: ICD-10-CM

## 2018-06-15 DIAGNOSIS — J01.00 ACUTE NON-RECURRENT MAXILLARY SINUSITIS: Primary | ICD-10-CM

## 2018-06-15 DIAGNOSIS — J20.9 ACUTE BRONCHITIS, UNSPECIFIED ORGANISM: ICD-10-CM

## 2018-06-15 PROCEDURE — 99213 OFFICE O/P EST LOW 20 MIN: CPT | Performed by: NURSE PRACTITIONER

## 2018-06-15 RX ORDER — PREDNISONE 20 MG/1
20 TABLET ORAL 2 TIMES DAILY
Qty: 10 TABLET | Refills: 0 | Status: SHIPPED | OUTPATIENT
Start: 2018-06-15 | End: 2018-08-01

## 2018-06-15 RX ORDER — DOXYCYCLINE 100 MG/1
100 CAPSULE ORAL 2 TIMES DAILY
Qty: 20 CAPSULE | Refills: 0 | Status: SHIPPED | OUTPATIENT
Start: 2018-06-15 | End: 2018-08-01
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
## 2018-06-15 NOTE — PROGRESS NOTES
"Lashonda Rivera is a 47 year old female who is being evaluated via a telephone visit.      The patient has been notified of following:     \"This telephone visit will be conducted via a call between you and your physician/provider. We have found that certain health care needs can be provided without the need for a physical exam.  This service lets us provide the care you need with a short phone conversation.  If a prescription is necessary we can send it directly to your pharmacy.  If lab work is needed we can place an order for that and you can then stop by our lab to have the test done at a later time.    We will bill your insurance company for this service.  Please check with your medical insurance if this type of visit is covered. You may be responsible for the cost of this type of visit if insurance coverage is denied.  The typical cost is $30 (10min), $59 (11-20min) and $85 (21-30min).  Most often these visits are shorter than 10 minutes.    If during the course of the call the physician/provider feels a telephone visit is not appropriate, you will not be charged for this service.\"       Consent has been obtained for this service by care team member: yes.   See the scanned image in the medical record.    Lashonda Rivera complains of  Cough    Acute Illness   Acute illness concerns: cough  Onset: x 4 days    Fever: no    Chills/Sweats: YES    Headache (location?): YES    Sinus Pressure:YES    Conjunctivitis:  no    Ear Pain: no    Rhinorrhea: YES    Congestion: YES    Sore Throat: YES     Cough: YES    Wheeze: YES    Decreased Appetite: YES    Nausea: no     Vomiting: no     Diarrhea:  no     Dysuria/Freq.: no     Fatigue/Achiness: YES    Sick/Strep Exposure: no      Therapies Tried and outcome: ibuprofen, sudafed made the mucus dried up.     I have reviewed and updated the patient's Past Medical History, Social History, Family History and Medication List.    ALLERGIES  Vicodin " [hydrocodone-acetaminophen]    Linda Ricks MA 6/15/2018  (MA signature)    Additional provider notes: Patient coughing continuously on the phone during today's telephone visit.  Cough sounds wheezy.  Patient does not sound like she is in distress or out of breath, but does ay when she is doing her normal activities, she is having some shortness of breath.  Also describes a lot of productive mucous from nose and lungs.  Mucous is yellow.  She is not a smoker.  No one in household smokes.  She has never had bronchitis or pneumonia.  She does not have asthma.  Does not have thermometer at home but feels hot and has body aches and chills.  Denies chest pain.      Assessment/Plan:  (J01.00) Acute non-recurrent maxillary sinusitis  (primary encounter diagnosis)  Comment: Will treat as below given symptoms.  Plan: doxycycline (VIBRAMYCIN) 100 MG capsule            (J20.9) Acute bronchitis, unspecified organism  Comment: Plan: doxycycline (VIBRAMYCIN) 100 MG capsule,         predniSONE (DELTASONE) 20 MG tablet          (R05) Cough  Comment: treatment as below.  Follow up if not improving in 1-2 days.  If worsening or patient has shortness of breath, advised to go to emergency room.  Patient understands.  Plan: predniSONE (DELTASONE) 20 MG tablet          I have reviewed the note as documented above.  This accurately captures the substance of my conversation with the patient,  KENNEDI Patel CNP     Total time of call between patient and provider was 6 minutes

## 2018-06-15 NOTE — TELEPHONE ENCOUNTER
Reason for Call:  Other    Detailed comments: Started feeling sick on 6/11 and starting new job. Can't afford to come in to see you because no insurance. Can you pescribe me a   z-christina and send it to Felton BARNES.     Phone Number Patient can be reached at: Cell number on file:    Telephone Information:   Mobile [phone]     Best Time: any    Can we leave a detailed message on this number? YES    Call taken on 6/15/2018 at 9:29 AM by Eva Lowe

## 2018-06-15 NOTE — PROGRESS NOTES
SUBJECTIVE:   Lashonda Rivera is a 47 year old female who presents to clinic today for the following health issues:      Problem list and histories reviewed & adjusted, as indicated.  Additional history: as documented    Patient Active Problem List   Diagnosis     CARDIOVASCULAR SCREENING; LDL GOAL LESS THAN 160     Tension headache     Migraine headache     Adjustment disorder with depressed mood     Seasonal allergic rhinitis, unspecified allergic rhinitis trigger     TBI (traumatic brain injury), without LOC, sequela     Adnexal cyst     Past Surgical History:   Procedure Laterality Date     BIOPSY BREAST Right 6/22/2016    Procedure: BIOPSY BREAST;  Surgeon: Kaiser Roy MD;  [facility]     C TOTAL ABDOM HYSTERECTOMY  06/11/2013    benign fibroids     C TREAT ECTOPIC PREG,RMV TUBE/OVARY  1995     HYSTERECTOMY, PAP NO LONGER INDICATED       SKIN GRAFT, EACH ADDN 100SQCM  1977    buttocks to left  foot     SURGICAL HISTORY OF -   2004    L wrist tendon       Social History   Substance Use Topics     Smoking status: Never Smoker     Smokeless tobacco: Never Used     Alcohol use No     Family History   Problem Relation Age of Onset     DIABETES Mother      DIABETES Maternal Grandmother      Hypertension Maternal Grandmother      Arthritis Maternal Grandmother      CANCER Maternal Grandmother      bladder cancer/cervix     CANCER Maternal Grandfather      bone     Asthma Son      Unknown/Adopted Paternal Grandfather      Unknown/Adopted Paternal Grandmother      Cervical Cancer Maternal Aunt          Current Outpatient Prescriptions   Medication Sig Dispense Refill     albuterol (PROAIR HFA/PROVENTIL HFA/VENTOLIN HFA) 108 (90 BASE) MCG/ACT Inhaler Inhale 2 puffs into the lungs every 4 hours as needed 1 Inhaler 0     cyclobenzaprine (FLEXERIL) 5 MG tablet Take 1 tablet (5 mg) by mouth 2 times daily as needed for muscle spasms 30 tablet 1     doxycycline (VIBRAMYCIN) 100 MG capsule Take 1 capsule  (100 mg) by mouth 2 times daily 20 capsule 0     fluticasone (FLONASE) 50 MCG/ACT nasal spray Spray 1-2 sprays into both nostrils daily 16 g 0     Multiple Vitamins-Minerals (HAIR SKIN NAILS) CAPS        predniSONE (DELTASONE) 20 MG tablet Take 1 tablet (20 mg) by mouth 2 times daily 10 tablet 0     Probiotic Product (PROBIOTIC DAILY PO) Take 2 tablets by mouth daily        SUMAtriptan (IMITREX) 100 MG tablet Take 1 tablet (100 mg) by mouth at onset of headache for migraine May repeat in 2 hours if needed: max 2/day 18 tablet 1     Allergies   Allergen Reactions     Vicodin [Hydrocodone-Acetaminophen]      itchy       Reviewed and updated as needed this visit by clinical staff  Tobacco  Allergies  Meds  Med Hx  Surg Hx  Soc Hx      Reviewed and updated as needed this visit by Provider  Tobacco  Med Hx  Surg Hx  Soc Hx        {PROVIDER CHARTING PREFERENCE:[id number]}

## 2018-06-15 NOTE — TELEPHONE ENCOUNTER
Patient can do e visit or telephone visit.  Please inform patient there is no guarantee we will call in antibiotics even with visit.   Thanks!  Maria Luisa (covering for Sriram)

## 2018-06-15 NOTE — TELEPHONE ENCOUNTER
Patient notified and made an appointment for telephone visit today with KRISTIAN Valdez at 11:40am.    Linda Ricks MA 6/15/2018

## 2018-06-15 NOTE — PATIENT INSTRUCTIONS
At [facility], we strive to deliver an exceptional experience to you, every time we see you.  If you receive a survey in the mail, please send us back your thoughts. We really do value your feedback.    Based on your medical history, these are the current health maintenance/preventive care services that you are due for (some may have been done at this visit.)  There are no preventive care reminders to display for this patient.    Suggested websites for health information:  Www.[place].org : Up to date and easily searchable information on multiple topics.  Www.medlineplus.gov : medication info, interactive tutorials, watch real surgeries online  Www.familydoctor.org : good info from the Academy of Family Physicians  Www.cdc.gov : public health info, travel advisories, epidemics (H1N1)  Www.aap.org : children's health info, normal development, vaccinations  Www.health.[facility].[place].us : [place] dept of health, public health issues in [place], N1N1    Your care team:                            Family Medicine Internal Medicine   MD Hussain Albright MD Shantel Branch-Fleming, MD Katya Georgiev PA-C Megan Hill, APRN CNP    Ralph Evans MD Pediatrics   Octavio Tabares, PARossiC  Maria Luisa Valdez, CNP MD Kim Finney APRN CNP   MD Tiffani Gordon MD Deborah Mielke, MD Kim Thein, APRN CNP      Clinic hours: Monday - Thursday 7 am-7 pm; Fridays 7 am-5 pm.   Urgent care: Monday - Friday 11 am-9 pm; Saturday and Sunday 9 am-5 pm.  Pharmacy : Monday -Thursday 8 am-8 pm; Friday 8 am-6 pm; Saturday and Sunday 9 am-5 pm.     Clinic: [phone]   Pharmacy: [phone]

## 2018-06-26 ENCOUNTER — RADIANT APPOINTMENT (OUTPATIENT)
Dept: MAMMOGRAPHY | Facility: CLINIC | Age: 48
End: 2018-06-26
Payer: COMMERCIAL

## 2018-06-26 DIAGNOSIS — Z12.31 VISIT FOR SCREENING MAMMOGRAM: ICD-10-CM

## 2018-06-26 PROCEDURE — 77067 SCR MAMMO BI INCL CAD: CPT | Mod: TC

## 2018-08-01 ENCOUNTER — OFFICE VISIT (OUTPATIENT)
Dept: FAMILY MEDICINE | Facility: CLINIC | Age: 48
End: 2018-08-01
Payer: COMMERCIAL

## 2018-08-01 VITALS
HEART RATE: 91 BPM | OXYGEN SATURATION: 99 % | DIASTOLIC BLOOD PRESSURE: 77 MMHG | WEIGHT: 149.2 LBS | SYSTOLIC BLOOD PRESSURE: 123 MMHG | TEMPERATURE: 98.8 F | BODY MASS INDEX: 26.64 KG/M2

## 2018-08-01 DIAGNOSIS — N64.4 MASTODYNIA: Primary | ICD-10-CM

## 2018-08-01 PROCEDURE — 99213 OFFICE O/P EST LOW 20 MIN: CPT | Performed by: PREVENTIVE MEDICINE

## 2018-08-01 RX ORDER — NAPROXEN 500 MG/1
TABLET, DELAYED RELEASE ORAL
Refills: 3 | COMMUNITY
Start: 2018-06-12 | End: 2021-07-06

## 2018-08-01 NOTE — PROGRESS NOTES
SUBJECTIVE:   Lashonda Rivera is a 47 year old female who presents to clinic today for the following health issues:      Breast Pain      Duration: couple weeks, worse x 2 days    Description (location/character/radiation): bilateral, right is worse.  Tender around breast, stinging/burning toward nipple.    Intensity:  moderate    Accompanying signs and symptoms: felt possible small lump in similar area to previously removed lump    History (similar episodes/previous evaluation): Similar pain when had benign lump removed from right side~2 yrs ago    Precipitating or alleviating factors: None    Therapies tried and outcome: None     Screening mammogram was normal 6/28/18  No family history of breast cancer  No trauma  No nipple discharge  No rash or redness  No chest radiation  Menarche age 13 years  Hysterectomy in 2013  Ovaries intact   Bilateral symptoms, right more than left   History of removal of a benign fibroadenoma in 2016    Problem list and histories reviewed & adjusted, as indicated.  Additional history: as documented    Patient Active Problem List   Diagnosis     CARDIOVASCULAR SCREENING; LDL GOAL LESS THAN 160     Tension headache     Migraine headache     Adjustment disorder with depressed mood     Seasonal allergic rhinitis, unspecified allergic rhinitis trigger     TBI (traumatic brain injury), without LOC, sequela     Adnexal cyst     Past Surgical History:   Procedure Laterality Date     BIOPSY BREAST Right 6/22/2016    Procedure: BIOPSY BREAST;  Surgeon: Kaiser Roy MD;  Location: [facility]     C TOTAL ABDOM HYSTERECTOMY  06/11/2013    benign fibroids     C TREAT ECTOPIC PREG,RMV TUBE/OVARY  1995     HYSTERECTOMY, PAP NO LONGER INDICATED       SKIN GRAFT, EACH ADDN 100SQCM  1977    buttocks to left  foot     SURGICAL HISTORY OF -   2004    L wrist tendon       Social History   Substance Use Topics     Smoking status: Never Smoker     Smokeless tobacco: Never Used     Alcohol use No      Family History   Problem Relation Age of Onset     Diabetes Mother      Diabetes Maternal Grandmother      Hypertension Maternal Grandmother      Arthritis Maternal Grandmother      Cancer Maternal Grandmother      bladder cancer/cervix     Cancer Maternal Grandfather      bone     Asthma Son      Unknown/Adopted Paternal Grandfather      Unknown/Adopted Paternal Grandmother      Cervical Cancer Maternal Aunt          Current Outpatient Prescriptions   Medication Sig Dispense Refill     albuterol (PROAIR HFA/PROVENTIL HFA/VENTOLIN HFA) 108 (90 BASE) MCG/ACT Inhaler Inhale 2 puffs into the lungs every 4 hours as needed 1 Inhaler 0     cyclobenzaprine (FLEXERIL) 5 MG tablet Take 1 tablet (5 mg) by mouth 2 times daily as needed for muscle spasms 30 tablet 1     fluticasone (FLONASE) 50 MCG/ACT nasal spray Spray 1-2 sprays into both nostrils daily 16 g 0     Multiple Vitamins-Minerals (HAIR SKIN NAILS) CAPS        NAPROXEN 500 MG TBEC TK 1 T PO BID PRF HEADACHES  3     Probiotic Product (PROBIOTIC DAILY PO) Take 2 tablets by mouth daily        SUMAtriptan (IMITREX) 100 MG tablet Take 1 tablet (100 mg) by mouth at onset of headache for migraine May repeat in 2 hours if needed: max 2/day 18 tablet 1     Allergies   Allergen Reactions     Vicodin [Hydrocodone-Acetaminophen]      itchy     BP Readings from Last 3 Encounters:   08/01/18 123/77   04/06/18 120/70   03/26/18 124/73    Wt Readings from Last 3 Encounters:   08/01/18 149 lb 3.2 oz (67.7 kg)   04/06/18 147 lb (66.7 kg)   03/26/18 148 lb (67.1 kg)                  Labs reviewed in EPIC    Reviewed and updated as needed this visit by clinical staff  Tobacco  Allergies  Meds  Med Hx  Surg Hx  Fam Hx  Soc Hx      Reviewed and updated as needed this visit by Provider         ROS:  Constitutional, HEENT, cardiovascular, pulmonary, gi and gu systems are negative, except as otherwise noted.    OBJECTIVE:                                                    /77  (BP Location: Left arm, Patient Position: Chair, Cuff Size: Adult Regular)  Pulse 91  Temp 98.8  F (37.1  C) (Oral)  Wt 149 lb 3.2 oz (67.7 kg)  LMP 06/10/2013  SpO2 99%  BMI 26.64 kg/m2  Body mass index is 26.64 kg/(m^2).  GENERAL APPEARANCE: healthy, alert and no distress  EYES: Eyes grossly normal to inspection and conjunctivae and sclerae normal  NECK: trachea midline and normal to palpation  RESP: lungs clear to auscultation - no rales, rhonchi or wheezes  CV: regular rates and rhythm, normal S1 S2, no S3 or S4 and no murmur, click or rub  ABDOMEN: soft, non-tender  MS: extremities normal- no gross deformities noted  SKIN: no suspicious lesions or rashes  NEURO: Normal strength and tone, mentation intact and speech normal  PSYCH: mentation appears normal  Breasts: no axillary nodes or nipple discharge. Diffuse tenderness in both breast, right side more than left. Area of nodularity in the right breast just superior to the areola at 12 o' clock position       Diagnostic test results:  Diagnostic Test Results:  No results found for this or any previous visit (from the past 24 hour(s)).     ASSESSMENT/PLAN:                                                    1. Mastodynia  -History of fibroadenoma removal  -Await imaging   - MA Diagnostic Digital Bilateral; Future  - US Breast Bilateral Complete 4 Quadrants; Future      Follow up with Provider - Await imaging, further plan to be determined then      Asmita Bhatia MD MPH    [facility]

## 2018-08-01 NOTE — PATIENT INSTRUCTIONS
At [facility], we strive to deliver an exceptional experience to you, every time we see you.  If you receive a survey in the mail, please send us back your thoughts. We really do value your feedback.    Based on your medical history, these are the current health maintenance/preventive care services that you are due for (some may have been done at this visit.)  There are no preventive care reminders to display for this patient.    Suggested websites for health information:  Www.Tea.org : Up to date and easily searchable information on multiple topics.  Www.medlineplus.gov : medication info, interactive tutorials, watch real surgeries online  Www.familydoctor.org : good info from the Academy of Family Physicians  Www.cdc.gov : public health info, travel advisories, epidemics (H1N1)  Www.aap.org : children's health info, normal development, vaccinations  Www.health.[facility].[place].us : [place] dept of health, public health issues in [place], N1N1    Your care team:                            Family Medicine Internal Medicine   MD Hussain Albright MD Shantel Branch-Fleming, MD Katya Georgiev PA-C Megan Hill, APRN CNP    Ralph Evans MD Pediatrics   Octavio Tabares, PARossiC  Maria Luisa Valdez, CNP MD Kim Finney APRN CNP   MD Tiffani Gordon MD Deborah Mielke, MD Kim Thein, APRN CNP      Clinic hours: Monday - Thursday 7 am-7 pm; Fridays 7 am-5 pm.   Urgent care: Monday - Friday 11 am-9 pm; Saturday and Sunday 9 am-5 pm.  Pharmacy : Monday -Thursday 8 am-8 pm; Friday 8 am-6 pm; Saturday and Sunday 9 am-5 pm.     Clinic: [phone]   Pharmacy: [phone]
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
## 2018-08-09 ENCOUNTER — RADIANT APPOINTMENT (OUTPATIENT)
Dept: MAMMOGRAPHY | Facility: CLINIC | Age: 48
End: 2018-08-09
Payer: COMMERCIAL

## 2018-08-09 ENCOUNTER — RADIANT APPOINTMENT (OUTPATIENT)
Dept: ULTRASOUND IMAGING | Facility: CLINIC | Age: 48
End: 2018-08-09
Payer: COMMERCIAL

## 2018-08-09 DIAGNOSIS — N64.4 MASTODYNIA: ICD-10-CM

## 2018-08-09 PROCEDURE — 77066 DX MAMMO INCL CAD BI: CPT | Performed by: STUDENT IN AN ORGANIZED HEALTH CARE EDUCATION/TRAINING PROGRAM

## 2018-08-09 PROCEDURE — 76642 ULTRASOUND BREAST LIMITED: CPT | Mod: RT | Performed by: STUDENT IN AN ORGANIZED HEALTH CARE EDUCATION/TRAINING PROGRAM

## 2018-08-09 NOTE — LETTER
Lashonda Rivera  [address]   [facility] [address]        August 9, 2018  Date of Exam:       Dear Lashonda:    Thank you for your recent visit.  Mammogram Result: Normal. We are pleased to inform you that the interpretation of your recent mammography did not find cancer.    Breast Problems: If you are experiencing any breast problems such as a lump or localized pain we request that you discuss this with your health care provider if you haven t already done so, as additional testing may be necessary.    Your Next Mammogram: The American College of Radiology and the Society of Breast Imaging recommend a yearly screening mammogram beginning at the age of 40 as the most effective way of saving lives from breast cancer. Please be aware that other screening recommendations do exist.    Mammogram Records: A report of your mammogram results was sent to the health care provider you indicated. Your mammogram and report will become part of your medical file here at [facility] for at least 10 years. You are responsible for informing any new health care provider or mammography facility of the date and location of this examination.     We appreciate the opportunity to participate in your health care.    Sincerely,    Dr. Baltazar  Interpreting Radiologist  [facility]

## 2018-09-07 ENCOUNTER — DOCUMENTATION ONLY (OUTPATIENT)
Dept: LAB | Facility: CLINIC | Age: 48
End: 2018-09-07

## 2018-09-07 DIAGNOSIS — Z13.220 LIPID SCREENING: ICD-10-CM

## 2018-09-07 DIAGNOSIS — Z00.00 ROUTINE GENERAL MEDICAL EXAMINATION AT A HEALTH CARE FACILITY: Primary | ICD-10-CM

## 2018-09-07 NOTE — PROGRESS NOTES
Patient has a lab appointment on 09/08/2018. Per the lab schedule scrubbing protocol they are not due for anything. Please review chart and send orders or let patient know appointment is not needed.    Thank you,  Jody Szymanski

## 2018-09-08 DIAGNOSIS — Z00.00 ROUTINE GENERAL MEDICAL EXAMINATION AT A HEALTH CARE FACILITY: ICD-10-CM

## 2018-09-08 DIAGNOSIS — Z13.220 LIPID SCREENING: ICD-10-CM

## 2018-09-08 PROCEDURE — 80061 LIPID PANEL: CPT | Performed by: PREVENTIVE MEDICINE

## 2018-09-08 PROCEDURE — 82947 ASSAY GLUCOSE BLOOD QUANT: CPT | Performed by: PREVENTIVE MEDICINE

## 2018-09-08 PROCEDURE — 36415 COLL VENOUS BLD VENIPUNCTURE: CPT | Performed by: PREVENTIVE MEDICINE

## 2018-09-10 LAB
CHOLEST SERPL-MCNC: 160 MG/DL
GLUCOSE SERPL-MCNC: 81 MG/DL (ref 70–99)
HDLC SERPL-MCNC: 49 MG/DL
LDLC SERPL CALC-MCNC: 98 MG/DL
NONHDLC SERPL-MCNC: 111 MG/DL
TRIGL SERPL-MCNC: 66 MG/DL

## 2018-09-11 NOTE — PROGRESS NOTES
Lashonda,     Glucose is normal, you do not have diabetes.  Cholesterol is at goal for you. I would encourage at least 150 minutes of moderate physical activity per week in order to improve HDL (good cholesterol).    Please do not hesitate to call us at [phone] if you have any questions or concerns.    Thank you,    Asmita Bhatia MD MPH

## 2018-09-14 ENCOUNTER — OFFICE VISIT (OUTPATIENT)
Dept: FAMILY MEDICINE | Facility: CLINIC | Age: 48
End: 2018-09-14
Payer: COMMERCIAL

## 2018-09-14 VITALS
TEMPERATURE: 98.1 F | HEIGHT: 63 IN | HEART RATE: 91 BPM | OXYGEN SATURATION: 98 % | BODY MASS INDEX: 25.87 KG/M2 | SYSTOLIC BLOOD PRESSURE: 113 MMHG | DIASTOLIC BLOOD PRESSURE: 75 MMHG | WEIGHT: 146 LBS

## 2018-09-14 DIAGNOSIS — Z23 NEED FOR PROPHYLACTIC VACCINATION AND INOCULATION AGAINST INFLUENZA: ICD-10-CM

## 2018-09-14 DIAGNOSIS — Z00.00 ROUTINE GENERAL MEDICAL EXAMINATION AT A HEALTH CARE FACILITY: Primary | ICD-10-CM

## 2018-09-14 PROCEDURE — 90686 IIV4 VACC NO PRSV 0.5 ML IM: CPT | Performed by: PREVENTIVE MEDICINE

## 2018-09-14 PROCEDURE — 90471 IMMUNIZATION ADMIN: CPT | Performed by: PREVENTIVE MEDICINE

## 2018-09-14 PROCEDURE — 99396 PREV VISIT EST AGE 40-64: CPT | Mod: 25 | Performed by: PREVENTIVE MEDICINE

## 2018-09-14 ASSESSMENT — PAIN SCALES - GENERAL: PAINLEVEL: NO PAIN (0)

## 2018-09-14 NOTE — PROGRESS NOTES
SUBJECTIVE:   CC: Lashonda Rivera is an 48 year old woman who presents for preventive health visit.     Healthy Habits:    Do you get at least three servings of calcium containing foods daily (dairy, green leafy vegetables, etc.)? yes    Amount of exercise or daily activities, outside of work: 4 day(s) per week, walking, hula hoop     Problems taking medications regularly No    Medication side effects: No    Have you had an eye exam in the past two years? yes    Do you see a dentist twice per year? yes    Do you have sleep apnea, excessive snoring or daytime drowsiness?no    The 10-year ASCVD risk score (Ang STEPHENSON Jr, et al., 2013) is: 0.7%    Values used to calculate the score:      Age: 48 years      Sex: Female      Is Non- : Yes      Diabetic: No      Tobacco smoker: No      Systolic Blood Pressure: 113 mmHg      Is BP treated: No      HDL Cholesterol: 49 mg/dL      Total Cholesterol: 160 mg/dL    Today's PHQ-2 Score:   PHQ-2 ( 1999 Pfizer) 3/26/2018 9/12/2017   Q1: Little interest or pleasure in doing things 0 0   Q2: Feeling down, depressed or hopeless 0 0   PHQ-2 Score 0 0       Abuse: Current or Past(Physical, Sexual or Emotional)- No  Do you feel safe in your environment - Yes    Social History   Substance Use Topics     Smoking status: Never Smoker     Smokeless tobacco: Never Used     Alcohol use No     If you drink alcohol do you typically have >3 drinks per day or >7 drinks per week? No                     Reviewed orders with patient.  Reviewed health maintenance and updated orders accordingly - Yes  Labs reviewed in EPIC  BP Readings from Last 3 Encounters:   09/14/18 113/75   08/01/18 123/77   04/06/18 120/70    Wt Readings from Last 3 Encounters:   09/14/18 146 lb (66.2 kg)   08/01/18 149 lb 3.2 oz (67.7 kg)   04/06/18 147 lb (66.7 kg)                  Patient Active Problem List   Diagnosis     CARDIOVASCULAR SCREENING; LDL GOAL LESS THAN 160     Tension headache      Migraine headache     Adjustment disorder with depressed mood     Seasonal allergic rhinitis, unspecified allergic rhinitis trigger     TBI (traumatic brain injury), without LOC, sequela     Adnexal cyst     Past Surgical History:   Procedure Laterality Date     BIOPSY BREAST Right 6/22/2016    Procedure: BIOPSY BREAST;  Surgeon: Kaiser Roy MD;  [facility]     C TOTAL ABDOM HYSTERECTOMY  06/11/2013    benign fibroids     C TREAT ECTOPIC PREG,RMV TUBE/OVARY  1995     HYSTERECTOMY, PAP NO LONGER INDICATED       SKIN GRAFT, EACH ADDN 100SQCM  1977    buttocks to left  foot     SURGICAL HISTORY OF -   2004    L wrist tendon       Social History   Substance Use Topics     Smoking status: Never Smoker     Smokeless tobacco: Never Used     Alcohol use No     Family History   Problem Relation Age of Onset     Diabetes Mother      Diabetes Maternal Grandmother      Hypertension Maternal Grandmother      Arthritis Maternal Grandmother      Cancer Maternal Grandmother      bladder cancer/cervix     Cancer Maternal Grandfather      bone     Asthma Son      Unknown/Adopted Paternal Grandfather      Unknown/Adopted Paternal Grandmother      Cervical Cancer Maternal Aunt          Current Outpatient Prescriptions   Medication Sig Dispense Refill     albuterol (PROAIR HFA/PROVENTIL HFA/VENTOLIN HFA) 108 (90 BASE) MCG/ACT Inhaler Inhale 2 puffs into the lungs every 4 hours as needed 1 Inhaler 0     cyclobenzaprine (FLEXERIL) 5 MG tablet Take 1 tablet (5 mg) by mouth 2 times daily as needed for muscle spasms 30 tablet 1     fluticasone (FLONASE) 50 MCG/ACT nasal spray Spray 1-2 sprays into both nostrils daily 16 g 0     Multiple Vitamins-Minerals (HAIR SKIN NAILS) CAPS        NAPROXEN 500 MG TBEC TK 1 T PO BID PRF HEADACHES  3     Probiotic Product (PROBIOTIC DAILY PO) Take 2 tablets by mouth daily        SUMAtriptan (IMITREX) 100 MG tablet Take 1 tablet (100 mg) by mouth at onset of headache for migraine May repeat in  2 hours if needed: max 2/day 18 tablet 1     Allergies   Allergen Reactions     Vicodin [Hydrocodone-Acetaminophen]      itchy       Patient under age 50, mutual decision reflected in health maintenance.      Pertinent mammograms are reviewed under the imaging tab.  History of abnormal Pap smear: Status post benign hysterectomy. Health Maintenance and Surgical History updated.  PAP / HPV 9/4/2012 10/16/2009   PAP NIL NIL     Reviewed and updated as needed this visit by clinical staff  Tobacco  Allergies  Meds  Med Hx  Surg Hx         Reviewed and updated as needed this visit by Provider  Allergies  Meds  Med Hx  Surg Hx        Past Medical History:   Diagnosis Date     Breast fibroadenoma, right 2016    excised     H/O: hysterectomy 2013     Hypothyroidism 2005    Now resolved     Leiomyoma of uterus, unspecified 2013    resolved     Menorrhagia 2000    resolved     Migraine headache 10/2/2012     Recurrent UTI 2012     TBI (traumatic brain injury), without LOC, sequela 9/12/2017     Vitamin B 12 deficiency 2005    Resolved      Past Surgical History:   Procedure Laterality Date     BIOPSY BREAST Right 6/22/2016    Procedure: BIOPSY BREAST;  Surgeon: Kaiser Roy MD;  [facility]     C TOTAL ABDOM HYSTERECTOMY  06/11/2013    benign fibroids     C TREAT ECTOPIC PREG,RMV TUBE/OVARY  1995     HYSTERECTOMY, PAP NO LONGER INDICATED       SKIN GRAFT, EACH ADDN 100SQCM  1977    buttocks to left  foot     SURGICAL HISTORY OF -   2004    L wrist tendon       ROS:  CONSTITUTIONAL: NEGATIVE for fever, chills, change in weight  INTEGUMENTARU/SKIN: NEGATIVE for worrisome rashes, moles or lesions  EYES: NEGATIVE for vision changes or irritation  ENT: NEGATIVE for ear, mouth and throat problems  RESP: NEGATIVE for significant cough or SOB  BREAST: NEGATIVE for masses, tenderness or discharge  CV: NEGATIVE for chest pain, palpitations or peripheral edema  GI: NEGATIVE for nausea, abdominal pain, heartburn, or  "change in bowel habits  MUSCULOSKELETAL: NEGATIVE for significant arthralgias or myalgia  NEURO: NEGATIVE for weakness, dizziness or paresthesias  ENDOCRINE: NEGATIVE for temperature intolerance, skin/hair changes  HEME/ALLERGY/IMMUNE: NEGATIVE for bleeding problems  PSYCHIATRIC: NEGATIVE for changes in mood or affect    OBJECTIVE:   /75  Pulse 91  Temp 98.1  F (36.7  C) (Oral)  Ht 5' 2.75\" (1.594 m)  Wt 146 lb (66.2 kg)  LMP 06/10/2013  SpO2 98%  Breastfeeding? No  BMI 26.07 kg/m2  EXAM:  GENERAL: healthy, alert and no distress  EYES: Eyes grossly normal to inspection, PERRL and conjunctivae and sclerae normal  HENT: ear canals and TM's normal, nose and mouth without ulcers or lesions  NECK: no adenopathy, no asymmetry, masses, or scars  RESP: lungs clear to auscultation - no rales, rhonchi or wheezes  CV: regular rate and rhythm, normal S1 S2, no S3 or S4, no murmur, click or rub, no peripheral edema and peripheral pulses strong  ABDOMEN: soft, nontender, no hepatosplenomegaly, no masses   MS: no gross musculoskeletal defects noted, no edema  SKIN: no suspicious lesions or rashes  NEURO: Normal strength and tone, mentation intact and speech normal  PSYCH: mentation appears normal, affect normal/bright  LYMPH: no cervical, supraclavicular adenopathy  BREAST: deferred,was done on 8/9/18    Diagnostic Test Results:  Results for orders placed or performed in visit on 09/08/18   Glucose   Result Value Ref Range    Glucose 81 70 - 99 mg/dL   Lipid panel reflex to direct LDL Fasting   Result Value Ref Range    Cholesterol 160 <200 mg/dL    Triglycerides 66 <150 mg/dL    HDL Cholesterol 49 (L) >49 mg/dL    LDL Cholesterol Calculated 98 <100 mg/dL    Non HDL Cholesterol 111 <130 mg/dL       ASSESSMENT/PLAN:   Lashonda was seen today for physical.    Diagnoses and all orders for this visit:    Routine general medical examination at a health care facility  -Lab results reviewed in detail  -Flu vaccine done " "today     COUNSELING:   Reviewed preventive health counseling, as reflected in patient instructions       Regular exercise       Healthy diet/nutrition       Vision screening       Immunizations    Vaccinated for: Influenza          BP Readings from Last 1 Encounters:   09/14/18 113/75     Estimated body mass index is 26.07 kg/(m^2) as calculated from the following:    Height as of this encounter: 5' 2.75\" (1.594 m).    Weight as of this encounter: 146 lb (66.2 kg).      Weight management plan: Discussed healthy diet and exercise guidelines and patient will follow up in 12 months in clinic to re-evaluate.     reports that she has never smoked. She has never used smokeless tobacco.      Counseling Resources:  ATP IV Guidelines  Pooled Cohorts Equation Calculator  Breast Cancer Risk Calculator  FRAX Risk Assessment  ICSI Preventive Guidelines  Dietary Guidelines for Americans, 2010  USDA's MyPlate  ASA Prophylaxis  Lung CA Screening    Asmita Bhatia MD MPH    [facility]  "

## 2018-09-14 NOTE — PROGRESS NOTES
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
## 2018-09-14 NOTE — PATIENT INSTRUCTIONS
At [facility], we strive to deliver an exceptional experience to you, every time we see you.  If you receive a survey in the mail, please send us back your thoughts. We really do value your feedback.    Based on your medical history, these are the current health maintenance/preventive care services that you are due for (some may have been done at this visit.)  Health Maintenance Due   Topic Date Due     INFLUENZA VACCINE (1) 09/01/2018       Suggested websites for health information:  Www.SealedMedia.org : Up to date and easily searchable information on multiple topics.  Www.medlineplus.gov : medication info, interactive tutorials, watch real surgeries online  Www.familydoctor.org : good info from the Academy of Family Physicians  Www.cdc.gov : public health info, travel advisories, epidemics (H1N1)  Www.aap.org : children's health info, normal development, vaccinations  Www.health.[facility].[place].us : [place] dept of health, public health issues in [place], N1N1    Your care team:                            Family Medicine Internal Medicine   MD Hussain Albright MD Shantel Branch-Fleming, MD Katya Georgiev PA-C Megan Hill, APRN CNP    Ralph Evans MD Pediatrics   Octavio Tabares, PARossiC  Maria Luisa Valdez, CNP MD Kim Finney APRN CNP   MD Tiffani Gordon MD Deborah Mielke, MD Kim Thein, APRN CNP      Clinic hours: Monday - Thursday 7 am-7 pm; Fridays 7 am-5 pm.   Urgent care: Monday - Friday 11 am-9 pm; Saturday and Sunday 9 am-5 pm.  Pharmacy : Monday -Thursday 8 am-8 pm; Friday 8 am-6 pm; Saturday and Sunday 9 am-5 pm.     Clinic: [phone]   Pharmacy: [phone]        Preventive Health Recommendations  Female Ages 40 to 49    Yearly exam:     See your health care provider every year in order to  1. Review health changes.   2. Discuss preventive care.    3. Review your medicines if your doctor prescribed any.      Get a Pap test every three years (unless  you have an abnormal result and your provider advises testing more often).      If you get Pap tests with HPV test, you only need to test every 5 years, unless you have an abnormal result. You do not need a Pap test if your uterus was removed (hysterectomy) and you have not had cancer.      You should be tested each year for STDs (sexually transmitted diseases), if you're at risk.     Ask your doctor if you should have a mammogram.      Have a colonoscopy (test for colon cancer) if someone in your family has had colon cancer or polyps before age 50.       Have a cholesterol test every 5 years.       Have a diabetes test (fasting glucose) after age 45. If you are at risk for diabetes, you should have this test every 3 years.    Shots: Get a flu shot each year. Get a tetanus shot every 10 years.     Nutrition:     Eat at least 5 servings of fruits and vegetables each day.    Eat whole-grain bread, whole-wheat pasta and brown rice instead of white grains and rice.    Get adequate Calcium and Vitamin D.      Lifestyle    Exercise at least 150 minutes a week (an average of 30 minutes a day, 5 days a week). This will help you control your weight and prevent disease.    Limit alcohol to one drink per day.    No smoking.     Wear sunscreen to prevent skin cancer.    See your dentist every six months for an exam and cleaning.

## 2018-09-20 ENCOUNTER — TELEPHONE (OUTPATIENT)
Dept: FAMILY MEDICINE | Facility: CLINIC | Age: 48
End: 2018-09-20

## 2018-09-20 NOTE — TELEPHONE ENCOUNTER
RN called patient to ask why she was requesting colonoscopy as RN does not see discussion of colonoscopy in clinic visit notes from 9/14/18.  Patient reports the following: she has blood on toilet paper everyday when wiping after bowel movement, experiences rectal pain quite frequently, deals with constipation on regular basis but takes probiotics for this and it helps her to be regular.   She read somewhere that it states that a person should have a colonoscopy done at the age of 45 years old and so with her age and some of the things she is experiencing she felt it might be a good idea to get one done.  She denies having a family member with colon problems or history of colon cancer.  Provider please review and advise and see phone message below please.  Erika Ford RN

## 2018-09-20 NOTE — TELEPHONE ENCOUNTER
Reason for Call:  Other Referral     Detailed comments: Lashonda was just in for a physical and forgot to ask about a colonoscopy.  Would you please put a referral in and call to let her know once it is placed so she can get that scheduled. Thank you      Phone Number Patient can be reached at: Home number on file [phone] (home)    Best Time: Any    Can we leave a detailed message on this number? YES    Call taken on 9/20/2018 at 2:30 PM by Saray Braga

## 2018-09-21 NOTE — TELEPHONE ENCOUNTER
Spoke with Lashonda to relay provider message. She states understanding and has no further questions. She states she will follow-up in clinic if constipation/GI symptoms persist/worsen. Closing encounter.     Joyce Galvez RN

## 2018-09-21 NOTE — TELEPHONE ENCOUNTER
At this time, the guidelines for a Screening Colonoscopy are starting age 50 years,unless there are certain risk factors (family history of colon cancer, inflammatory bowel disease etc). Lashonda is correct to note that there have been recent recommendations from certain groups such as the American Cancer Society that have recommended starting screening at age 45 regardless of risk factors. However, this has not become standard of care yet and her insurance will not pay for screening colonoscopy unless she is 50 years.    If the constipation symptoms are well managed with Probiotics, she can continue with their use. Otherwise, will need further evaluation in clinic.    Please let me know if any questions.    Thanks,    Asmita Bhatia MD MPH

## 2018-11-08 ENCOUNTER — TELEPHONE (OUTPATIENT)
Dept: FAMILY MEDICINE | Facility: CLINIC | Age: 48
End: 2018-11-08

## 2018-11-08 NOTE — TELEPHONE ENCOUNTER
Team please contact patient. We do not keep blood type on file for patients. If she wishes to have blood type drawn at lab to by typed, insurance may not pay for it if it is not for medical necessity.  Thank you,  Erika Ford RN

## 2018-11-08 NOTE — TELEPHONE ENCOUNTER
Patient notified with the message below. Verbally understands no further question at this time.  Linda Ricks MA 11/8/2018

## 2018-11-08 NOTE — TELEPHONE ENCOUNTER
...Reason for Call:  Other     Detailed comments: Patient called wanting to know er blood type, can someone please yousif her back with this information    Phone Number Patient can be reached at: [phone]    Best Time: anytime    Can we leave a detailed message on this number? YES    Call taken on 11/8/2018 at 9:40 AM by David Garcia

## 2019-03-12 ENCOUNTER — TELEPHONE (OUTPATIENT)
Dept: FAMILY MEDICINE | Facility: CLINIC | Age: 49
End: 2019-03-12

## 2019-03-12 NOTE — TELEPHONE ENCOUNTER
Called pt to schedule appt for her rash.  Pt has decided to go into urgent care for this symptom.      Juhi COX (R))

## 2019-03-12 NOTE — TELEPHONE ENCOUNTER
This would need a doctor visit.   Please assist patient in scheduling appointment.     Latoya Shay RN

## 2019-03-12 NOTE — TELEPHONE ENCOUNTER
Reason for Call:  Other prescription    Detailed comments: Lashonda broke out in what she describes as a fine rash on her chin. Asking if you can prescribe some cream or something for her.     Phone Number Patient can be reached at: Home number on file [phone] (home)    Best Time: Any    Can we leave a detailed message on this number? YES    Call taken on 3/12/2019 at 2:09 PM by Saray Braga

## 2019-03-15 ENCOUNTER — TELEPHONE (OUTPATIENT)
Dept: OBGYN | Facility: CLINIC | Age: 49
End: 2019-03-15

## 2019-03-15 NOTE — TELEPHONE ENCOUNTER
"Received phone call from patient- patient was seen at [facility] ED yesterday morning for pain. Was diagnosed with left ovarian cyst.  Patient was discharged home with Oxycodone and told to f/u with OB/GYN.  Patient took a pain pill at 11:00 am and is thinking she will need to take another.  Patient states when the pain mediation wears off it is at a 9.  \"Stops me in my tracks- it feels like contractions or spasms\".  Patient can be reached at [phone].    "

## 2019-03-15 NOTE — TELEPHONE ENCOUNTER
"Spoke with pt.  She states she was seen in the ED yesterday for intense abdominal cramping since Tuesday.  They gave pt pain meds, told her she had a left ovarian cyst and instructed her to f/u with her OB/GYN provider.  Pt unable to get in until next week with her OB/GYN provider.  Pt continues to have intense abdominal cramping every 2 minutes where it brings her to her knees.  She has taken one oxycodone that the ED gave her but doesn't want to keep taking them as she doesn't like the way it makes her feel.  Pt states her last BM was yesterday but it wasn't normal.  She states it was black and \"pastey\".  Advised pt to be further evaluated in the ED again today due to her intense cramping every 2 minutes and her black, \"pastey\" stools.  Pt verbalized understanding and agreed to plan.    Berkley Romero RN    "

## 2019-03-21 ENCOUNTER — OFFICE VISIT (OUTPATIENT)
Dept: OBGYN | Facility: CLINIC | Age: 49
End: 2019-03-21
Payer: COMMERCIAL

## 2019-03-21 VITALS
DIASTOLIC BLOOD PRESSURE: 82 MMHG | WEIGHT: 148 LBS | HEART RATE: 92 BPM | TEMPERATURE: 97.9 F | SYSTOLIC BLOOD PRESSURE: 147 MMHG | BODY MASS INDEX: 26.43 KG/M2

## 2019-03-21 DIAGNOSIS — N94.9 ADNEXAL CYST: ICD-10-CM

## 2019-03-21 DIAGNOSIS — K92.1 HEMATOCHEZIA: ICD-10-CM

## 2019-03-21 DIAGNOSIS — R10.84 ABDOMINAL PAIN, GENERALIZED: Primary | ICD-10-CM

## 2019-03-21 PROCEDURE — 99214 OFFICE O/P EST MOD 30 MIN: CPT | Performed by: OBSTETRICS & GYNECOLOGY

## 2019-03-21 NOTE — PATIENT INSTRUCTIONS
If you have any questions regarding your visit, Please contact your care team.     [facility]: [phone]  [facility] CLINIC HOURS TELEPHONE NUMBER       Pardeep Marcus M.D.        Prisca-    RN-      Omni-Medical Assistant       Monday-[place]  8:00a.m-4:45 p.m  Tuesday-[place]  9:00a.m-4:00 p.m  Wednesday-[place] 8:00a.m-4:45 p.m.  Thursday-[place]  8:00a.m-4:45 p.m.  Friday-[place]  8:00a.m-4:45 p.m. [facility]  [address]  [place], [place] [address] [phone] ask [facility]  [phone] Fax  Imaging [phone]    [facility]  [address] [place] [address] [address].  [place], [place] [address] [phone]    [facility]  [address] [place] [address]  [phone] ask [facility]  [phone] Fax  Imaging [phone]     Urgent Care locations:    [place]        [place] Monday-Friday  5 pm - 9 pm  Saturday and Sunday   9 am - 5 pm  Monday-Friday   11 am - 9 pm  Saturday and Sunday   9 am - 5 pm   [phone]   If you need a medication refill, please contact your pharmacy. Please allow 3 business days for your refill to be completed.  As always, Thank you for trusting us with your healthcare needs!

## 2019-03-22 NOTE — PROGRESS NOTES
OB-GYN Problem-Oriented Visit or Consultation      Lashonda Rivera is a 48 year old year old P 2 who presents with a chief complaint of abd pain and ovarian cyst.  Referred by self.  Patient's last menstrual period was 06/10/2013.    HPI:     See prior notes. In the interval had to switch jobs due to traumatic brain injury, did not go to [place], and   6 mo ago. In 2017 had a stable non-specific cystic adnexal region R>L.  Past total abdominal hysterectomy.     Recent sudden spontaneous onset of abd cramping from lower abd to epigastric region associated with nausea. Thought it was food poisoning at first. [facility] ED evaluation reviewed CT and ultrasound showed mod stool burden and two simple left ovarian cysts without bleeding or torsion. Not using analgesics because she needs to drive and work but reports intermittent pain continues and nothing seems to help. Has been using Pepto Bismol, Metamucil, and Dulcolax. Additionally does report some intermittent blood in the stool for months and desires colonoscopy.     Past medical, obstetrical, surgical, family and social history reviewed and as noted or updated in chart.     Allergies, meds and supplements are as noted or updated in chart.      ROS:   Systems reviewed include:  constitutional, gastrointestinal, genitourinary, musculoskeletal, integumentary, psychological, hematologic/lymphatic and endocrine.    These systems were negative for significant symptoms except for the following additional: none; see HPI.    EXAM:  VS as noted. /82 (BP Location: Left arm, Patient Position: Chair, Cuff Size: Adult Regular)   Pulse 92   Temp 97.9  F (36.6  C) (Oral)   Wt 67.1 kg (148 lb)   LMP 06/10/2013   Breastfeeding? No   BMI 26.43 kg/m    Constitutionally normal.     Abd was  normal or negative except for, or in particular noting, the following  pertinent findings: soft but definite gaseous distension, minimal RLQ and supraumbilical tenderness without  mass or hernia, no acute findings.      Assessment:   Encounter Diagnoses   Name Primary?     Abdominal pain, generalized Yes     Adnexal cyst      Hematochezia        I reviewed the condition, causes, differential diagnosis, prognosis, evaluation and management considerations and options.  Questions answered and information given. See orders.         Plan and Recommendations: Try soft bland lactose free diet, stop Metamucil for now, use GasEx or Mylicon for a few days. Report progress next week.   The adnexal regions are cystic but not highly suspicious, similar to past findings, and may not be the cause of recent pain.   Will need to arrange colonoscopy too.     Total encounter time (physician together with patient) = 30min. Direct counseling, education and care coordination time (physician together with patient) = 20min.       A/P:  Lashonda was seen today for follow up.    Diagnoses and all orders for this visit:    Abdominal pain, generalized    Adnexal cyst    Hematochezia  -     GASTROENTEROLOGY ADULT REF PROCEDURE ONLY Donya Vincent [facility] [phone]; [facility]        Pardeep Marcus MD

## 2019-03-28 ENCOUNTER — TELEPHONE (OUTPATIENT)
Dept: OBGYN | Facility: CLINIC | Age: 49
End: 2019-03-28

## 2019-03-28 NOTE — TELEPHONE ENCOUNTER
[facility] Call Center    Phone Message    May a detailed message be left on voicemail: yes    Reason for Call: Other: Patient called to discuss her diet plan that she has been eating this last week. She is wondering if she needs to come in and be seen to do this or if she can do this over the phone. Please call back and advise.     Action Taken: Message routed to:  [facility] p [phone]

## 2019-03-29 NOTE — TELEPHONE ENCOUNTER
Please call patient and check on her progress with her pain and her diet. See last clinic note. I see also that colonoscopy is scheduled and that is fine.   Pardeep Marcus MD

## 2019-03-29 NOTE — TELEPHONE ENCOUNTER
Called patient to see how her pain is going, patient stated that it has gotten better. Patient feels that she has her pain controlled. Still a little pain but not like before. The liquid and soft diet has worked very well for her. Patient did have her oral surgery yesterday. Patient is scheduled for for colonoscopy for 4/4/19 in [place].     patient will upload her food log later today for Dr. Marcus to look at.    Rebecca Rizo  [facility]

## 2019-04-01 NOTE — TELEPHONE ENCOUNTER
Called out to patient to let her know to continue plan as  previous message. Patient was not able to make it in for her 1 wk due to her work schedule. Patient is scheduled for a soft diet food follow up in 2 weeks    Rebecca Rizo  [facility]

## 2019-04-02 RX ORDER — AMOXICILLIN 500 MG/1
500 CAPSULE ORAL 2 TIMES DAILY
COMMUNITY
Start: 2019-03-28 | End: 2020-02-06

## 2019-04-02 ASSESSMENT — MIFFLIN-ST. JEOR: SCORE: 1280.45

## 2019-04-04 ENCOUNTER — HOSPITAL ENCOUNTER (OUTPATIENT)
Facility: AMBULATORY SURGERY CENTER | Age: 49
Discharge: HOME | End: 2019-04-04
Attending: SURGERY | Admitting: SURGERY
Payer: COMMERCIAL

## 2019-04-04 VITALS
RESPIRATION RATE: 16 BRPM | WEIGHT: 148 LBS | BODY MASS INDEX: 26.22 KG/M2 | DIASTOLIC BLOOD PRESSURE: 82 MMHG | TEMPERATURE: 98 F | HEART RATE: 86 BPM | HEIGHT: 63 IN | OXYGEN SATURATION: 99 % | SYSTOLIC BLOOD PRESSURE: 116 MMHG

## 2019-04-04 DIAGNOSIS — K60.2 ANAL FISSURE: Primary | ICD-10-CM

## 2019-04-04 LAB — COLONOSCOPY: NORMAL

## 2019-04-04 PROCEDURE — G8918 PT W/O PREOP ORDER IV AB PRO: HCPCS

## 2019-04-04 PROCEDURE — 45378 DIAGNOSTIC COLONOSCOPY: CPT

## 2019-04-04 PROCEDURE — 45378 DIAGNOSTIC COLONOSCOPY: CPT | Performed by: SURGERY

## 2019-04-04 PROCEDURE — 99152 MOD SED SAME PHYS/QHP 5/>YRS: CPT | Mod: 59 | Performed by: SURGERY

## 2019-04-04 PROCEDURE — G8907 PT DOC NO EVENTS ON DISCHARG: HCPCS

## 2019-04-04 RX ORDER — LIDOCAINE 40 MG/G
CREAM TOPICAL
Status: DISCONTINUED | OUTPATIENT
Start: 2019-04-04 | End: 2019-04-05 | Stop reason: HOSPADM

## 2019-04-04 RX ORDER — FENTANYL CITRATE 50 UG/ML
INJECTION, SOLUTION INTRAMUSCULAR; INTRAVENOUS PRN
Status: DISCONTINUED | OUTPATIENT
Start: 2019-04-04 | End: 2019-04-04 | Stop reason: HOSPADM

## 2019-04-04 NOTE — DISCHARGE INSTRUCTIONS
I have written a prescription for 0.2% nifedipine cream.  Apply to the anus where the muscles are snug about every 12 hours until pain is gone.  This helps to relax this muscle to allow your anal fissure (cut at the anus) to heal.  You must get this filled at the [facility] pharmacy since they make it for me.    Discussed anal fissure treatment including fiber to soften the stool, nifedipine cream 0.2 % with prescription and how to use it every 12 hours, and viscus lidocaine 2% for discomfort and prescription given if patient wanted it.      Moise Ingram MD

## 2019-04-17 ENCOUNTER — OFFICE VISIT (OUTPATIENT)
Dept: FAMILY MEDICINE | Facility: CLINIC | Age: 49
End: 2019-04-17
Payer: COMMERCIAL

## 2019-04-17 VITALS
HEART RATE: 85 BPM | HEIGHT: 63 IN | DIASTOLIC BLOOD PRESSURE: 80 MMHG | TEMPERATURE: 97.8 F | BODY MASS INDEX: 25.69 KG/M2 | WEIGHT: 145 LBS | OXYGEN SATURATION: 100 % | SYSTOLIC BLOOD PRESSURE: 128 MMHG

## 2019-04-17 DIAGNOSIS — R10.84 ABDOMINAL PAIN, GENERALIZED: Primary | ICD-10-CM

## 2019-04-17 PROCEDURE — 99214 OFFICE O/P EST MOD 30 MIN: CPT | Performed by: PREVENTIVE MEDICINE

## 2019-04-17 RX ORDER — ASPIRIN 81 MG
100 TABLET, DELAYED RELEASE (ENTERIC COATED) ORAL 2 TIMES DAILY PRN
Qty: 60 TABLET | Refills: 0 | Status: SHIPPED | OUTPATIENT
Start: 2019-04-17 | End: 2020-09-22

## 2019-04-17 RX ORDER — SUCRALFATE ORAL 1 G/10ML
1 SUSPENSION ORAL 4 TIMES DAILY PRN
Qty: 420 ML | Refills: 0 | Status: SHIPPED | OUTPATIENT
Start: 2019-04-17 | End: 2020-02-06

## 2019-04-17 RX ORDER — NICOTINE POLACRILEX 4 MG/1
20 GUM, CHEWING ORAL DAILY
Qty: 30 TABLET | Refills: 0 | Status: SHIPPED | OUTPATIENT
Start: 2019-04-17 | End: 2020-02-06

## 2019-04-17 ASSESSMENT — MIFFLIN-ST. JEOR: SCORE: 1266.85

## 2019-04-17 ASSESSMENT — PAIN SCALES - GENERAL: PAINLEVEL: NO PAIN (0)

## 2019-04-17 NOTE — PROGRESS NOTES
"  SUBJECTIVE:   Lashonda Rivera is a 48 year old female who presents to clinic today for the following   health issues:      Follow up abdominal pain:    Seen in the emergency room on 3/14/19, the following is a summary from Care Everywhere:    \"Lashonda Rivera is a 48 y.o. female presenting with left lower abdominal pain that has been intermittent for a couple of weeks. She has some bowel symptoms along with it and was a little bit constipated yesterday. She has a history of a partial hysterectomy in the past. We proceeded with a CT of the abdomen to rule out diverticulitis primarily, but this was negative for diverticulitis or other intraabdominal inflammatory changes and did show a complex cystic structure in the left adnexa. This was further evaluated with and ultrasound, documented flow to both ovaries along with a small complex cyst in the left adnexa and a larger simple one.\"    Subsequently seen by Dr Marcus 3/21/19. Advised bland diet, patient felt much better with this. Adnexal cyst not highly suspicious, similar to past findings and was not felt to be causing symptoms.     Underwent colonoscopy 4/4/19, essentially normal, repeat in 10 years, anal fissure seen, medication sent by surgeon.      ABDOMINAL   PAIN     Onset: March 2019     Description:   Character: Sharp  Location: starts in the low abdomen, radiates up and to the sides   Radiation: sides     Intensity: severe    Progression of Symptoms:  intermittent    Accompanying Signs & Symptoms:  Fever/Chills?: no   Gas/Bloating: no   Nausea: YES  Vomitting: no   Diarrhea?: no   Constipation:YES  Dysuria or Hematuria: no    History:   Trauma: no   Previous similar pain: no    Previous tests done: none    Precipitating factors:   Does the pain change with:     Food: YES- fried chicken      BM: no     Urination: no     Alleviating factors:  None    Therapies Tried and outcome: Grady diet     LMP:  not applicable, Hysterectomy+        Additional history: " as documented    Reviewed  and updated as needed this visit by clinical staff  Tobacco  Allergies  Meds  Med Hx  Surg Hx  Fam Hx         Reviewed and updated as needed this visit by Provider  Allergies  Meds  Med Hx  Surg Hx  Fam Hx         Patient Active Problem List   Diagnosis     CARDIOVASCULAR SCREENING; LDL GOAL LESS THAN 160     Tension headache     Migraine headache     Adjustment disorder with depressed mood     Seasonal allergic rhinitis, unspecified allergic rhinitis trigger     TBI (traumatic brain injury), without LOC, sequela     Adnexal cyst     Past Surgical History:   Procedure Laterality Date     BIOPSY BREAST Right 6/22/2016    Procedure: BIOPSY BREAST;  Surgeon: Kaiser Roy MD;  Location: [facility]     C TOTAL ABDOM HYSTERECTOMY  06/11/2013    benign fibroids     C TREAT ECTOPIC PREG,RMV TUBE/OVARY  1995     HYSTERECTOMY, PAP NO LONGER INDICATED       SKIN GRAFT, EACH ADDN 100SQCM  1977    buttocks to left  foot     SURGICAL HISTORY OF -   2004    L wrist tendon       Social History     Tobacco Use     Smoking status: Never Smoker     Smokeless tobacco: Never Used   Substance Use Topics     Alcohol use: No     Alcohol/week: 0.0 oz     Family History   Problem Relation Age of Onset     Diabetes Mother      Diabetes Maternal Grandmother      Hypertension Maternal Grandmother      Arthritis Maternal Grandmother      Cancer Maternal Grandmother         bladder cancer/cervix     Cancer Maternal Grandfather         bone     Asthma Son      Unknown/Adopted Paternal Grandfather      Unknown/Adopted Paternal Grandmother      Cervical Cancer Maternal Aunt          Current Outpatient Medications   Medication Sig Dispense Refill     albuterol (PROAIR HFA/PROVENTIL HFA/VENTOLIN HFA) 108 (90 BASE) MCG/ACT Inhaler Inhale 2 puffs into the lungs every 4 hours as needed 1 Inhaler 0     amoxicillin (AMOXIL) 500 MG capsule Take 500 mg by mouth 2 times daily       cyclobenzaprine (FLEXERIL) 5 MG  "tablet Take 1 tablet (5 mg) by mouth 2 times daily as needed for muscle spasms 30 tablet 1     docusate sodium (COLACE) 100 MG tablet Take 1 tablet (100 mg) by mouth 2 times daily as needed for constipation 60 tablet 0     fluticasone (FLONASE) 50 MCG/ACT nasal spray Spray 1-2 sprays into both nostrils daily 16 g 0     Multiple Vitamins-Minerals (HAIR SKIN NAILS) CAPS        NAPROXEN 500 MG TBEC TK 1 T PO BID PRF HEADACHES  3     omeprazole 20 MG tablet Take 1 tablet (20 mg) by mouth daily Take 30-60 minutes before a meal. 30 tablet 0     order for DME Place rectally every 12 hours 1.8 oz container of 0.2 % nifedipine cream  Apply on the anus every 12 hours.  Just get to where the anus is tight . 1 Container 5     Probiotic Product (PROBIOTIC DAILY PO) Take 2 tablets by mouth daily        sucralfate (CARAFATE) 1 GM/10ML suspension Take 10 mLs (1 g) by mouth 4 times daily as needed for nausea 420 mL 0     SUMAtriptan (IMITREX) 100 MG tablet Take 1 tablet (100 mg) by mouth at onset of headache for migraine May repeat in 2 hours if needed: max 2/day 18 tablet 1     Allergies   Allergen Reactions     Vicodin [Hydrocodone-Acetaminophen]      itchy     BP Readings from Last 3 Encounters:   04/17/19 128/80   04/04/19 116/82   03/21/19 147/82    Wt Readings from Last 3 Encounters:   04/17/19 65.8 kg (145 lb)   04/02/19 67.1 kg (148 lb)   03/21/19 67.1 kg (148 lb)                  Labs reviewed in EPIC    ROS:  Constitutional, HEENT, cardiovascular, pulmonary, gi and gu systems are negative, except as otherwise noted.    OBJECTIVE:                                                    /80   Pulse 85   Temp 97.8  F (36.6  C) (Oral)   Ht 1.6 m (5' 3\")   Wt 65.8 kg (145 lb)   LMP 06/10/2013   SpO2 100%   Breastfeeding? No   BMI 25.69 kg/m    Body mass index is 25.69 kg/m .      GENERAL APPEARANCE: healthy, alert and no distress  EYES: Eyes grossly normal to inspection and conjunctivae and sclerae normal  NECK: no " adenopathy and trachea midline and normal to palpation  RESP: lungs clear to auscultation - no rales, rhonchi or wheezes  CV: regular rates and rhythm, normal S1 S2, no S3 or S4 and no murmur, click or rub  ABDOMEN: non distended, no rebound or guarding, some lower abdomen tenderness, bowel sounds+   MS: extremities normal- no gross deformities noted and peripheral pulses normal  SKIN: no suspicious lesions or rashes  NEURO: Normal strength and tone, mentation intact and speech normal  PSYCH: mentation appears normal      Diagnostic test results:  Diagnostic Test Results:  Results for orders placed or performed during the hospital encounter of 04/04/19   COLONOSCOPY   Result Value Ref Range    COLONOSCOPY       [facility]  Endoscopy Department-[place]  _______________________________________________________________________________  Patient Name: Lashonda Rivera        Procedure Date: 4/4/2019 10:25 AM  MRN: [id number]                       YOB: 1970  Admit Type: Outpatient                Age: 48  Gender: Female                        Note Status: Finalized  Attending MD: Moise Ingram MD     Instrument Name: [id number]  _______________________________________________________________________________     Procedure:                Colonoscopy  Indications:              Generalized abdominal pain, Anal bleeding  Providers:                Moise Ingram MD, Raúl Gramajo  Referring MD:             Pardeep Gordon MD  Medicines:                Fentanyl 150 micrograms IV, Midazolam 3 mg IV  Complications:            No immediate complications.  _______________________________________________________________________________  Proce dure:                Pre-Anesthesia Assessment:                            - Prior to the procedure, a History and Physical                             was performed, and patient medications and                              allergies were reviewed. The risks and benefits of                             the procedure and the sedation options and risks                             were discussed with the patient. All questions were                             answered and informed consent was obtained. Patient                             identification and proposed procedure were verified                             by the physician in the pre-procedure area. Mental                             Status Examination: alert and oriented. Airway                             Examination: normal oropharyngeal airway and neck                             mobility. Respiratory Examination: clear to                             auscultation. CV Examination: normal. Prophylactic                              Antibiotics: The patient does not require                             prophylactic antibiotics. Prior Anticoagulants: The                             patient has taken no previous anticoagulant or                             antiplatelet agents. ASA Grade Assessment: II - A                             patient with mild systemic disease. After reviewing                             the risks and benefits, the patient was deemed in                             satisfactory condition to undergo the procedure.                             The anesthesia plan was to use moderate sedation /                             analgesia (conscious sedation). This assessment was                             completed before the administration of sedation at                             10:25 AM.                            After obtaining informed consent, the colonoscope                             was passed under direct vision. Throughout the                             procedure, the patient's blood p ressure, pulse, and                             oxygen saturations were monitored continuously. The                             Colonoscope was introduced through the  anus and                             advanced to the cecum, identified by appendiceal                             orifice and ileocecal valve. The colonoscopy was                             performed without difficulty. The patient tolerated                             the procedure well. The quality of the bowel                             preparation was good.                                                                                   Findings:       The perianal and digital rectal examinations were normal.       The colon (entire examined portion) appeared normal.       The terminal ileum appeared normal.       The exam was otherwise without abnormality.                                                                                   Moderate Sedation:       Moderate (conscious) sedation was administered by the  endoscopy nurse        and supervised by the endoscopist. The following parameters were        monitored: oxygen saturation, heart rate, blood pressure, and response        to care. Total physician intraservice time was 17 minutes.  Impression:               - The entire examined colon is normal.                            - The examined portion of the ileum was normal.                            - The examination was otherwise normal.                            - No specimens collected.  Recommendation:           - I also was able to see in the last part of your                             small bowel and it too looked normal. No polyps or                             diverticulosis seen. Your colonoscopy was normal.                             Your next colonoscopy will be in 10 years as long                             as you do not have any changes in your bowel                             habits, or blood in your stool. I would recommend                             that you follow  up with your regular doctor on at                             least an annual basis. For questions or to set up                              an appointment please give me a call at [phone].                            I have written a prescription for 0.2% nifedipine                             cream.                            Apply to the anus where the muscles are snug about                             every 12 hours until pain is gone. This helps to                             relax this muscle to allow your anal fissure (cut                             at the anus) to heal.                            You must get this filled at the [facility] pharmacy since they make it for                             me.                            Discussed anal fissure treatment including fiber to                             soften the stool, nifedipine cream 0.2 % with                             prescript ion and how to use it every 12 hours, and                             viscus lidocaine 2% for discomfort and prescription                             given if patient wanted it.                            Moise Ingram MD                                                                                     ___________________  Moise Ingram MD  4/4/2019 11:05:05 AM  I was physically present for the entire viewing portion of the exam.Moise Ingram MD  Number of Addenda: 0    Note Initiated On: 4/4/2019 10:25 AM  Scope In:  Scope Out:          ASSESSMENT/PLAN:                                                    1. Abdominal pain, generalized  -Intermittent, severe  -tends to be worse with a fatty meal  -Better with a bland diet  -Gallbladder was normal on recent CT scan   - docusate sodium (COLACE) 100 MG tablet; Take 1 tablet (100 mg) by mouth 2 times daily as needed for constipation  Dispense: 60 tablet; Refill: 0  - omeprazole 20 MG tablet; Take 1 tablet (20 mg) by mouth daily Take 30-60 minutes before a meal.  Dispense: 30 tablet;  Refill: 0  - sucralfate (CARAFATE) 1 GM/10ML suspension; Take 10 mLs (1 g) by mouth 4 times daily as needed for nausea  Dispense: 420 mL; Refill: 0  - GASTROENTEROLOGY ADULT REF CONSULT ONLY  -Colonoscopy normal  -ER precautions reviewed in detail. If increased abdominal pain, fever over 101 F, emesis, rectal bleeding, melena, then needs to be seen in ER, patient expressed comprehension of this.   -has also had some constipation that is not fully resolved with Miralax, hence added Colace.         Follow up with Provider - per GI      Asmita Bhatia MD MPH    [facility]

## 2019-04-17 NOTE — PATIENT INSTRUCTIONS
At [facility], we strive to deliver an exceptional experience to you, every time we see you.  If you receive a survey in the mail, please send us back your thoughts. We really do value your feedback.    Your care team:                            Family Medicine Internal Medicine   MD Hussain Albright MD Shantel Branch-Fleming, MD Katya Georgiev PA-C Megan Hill, APRN DEVIN Evans MD Pediatrics   Octavio Tabares, LIDIA Valdez, MD Kim Clarke APRN CNP   MD Tiffani Gordon MD Deborah Mielke, MD Sommer Seo, APRN [facility]      Clinic hours: Monday - Thursday 7 am-7 pm; Fridays 7 am-5 pm.   Urgent care: Monday - Friday 11 am-9 pm; Saturday and Sunday 9 am-5 pm.  Pharmacy : Monday -Thursday 8 am-8 pm; Friday 8 am-6 pm; Saturday and Sunday 9 am-5 pm.     Clinic: [phone]   Pharmacy: [phone]

## 2019-07-09 ENCOUNTER — OFFICE VISIT (OUTPATIENT)
Dept: GASTROENTEROLOGY | Facility: CLINIC | Age: 49
End: 2019-07-09
Attending: PREVENTIVE MEDICINE
Payer: COMMERCIAL

## 2019-07-09 VITALS
HEIGHT: 63 IN | HEART RATE: 82 BPM | WEIGHT: 151.9 LBS | BODY MASS INDEX: 26.91 KG/M2 | OXYGEN SATURATION: 100 % | SYSTOLIC BLOOD PRESSURE: 119 MMHG | DIASTOLIC BLOOD PRESSURE: 80 MMHG

## 2019-07-09 DIAGNOSIS — R10.84 ABDOMINAL PAIN, GENERALIZED: Primary | ICD-10-CM

## 2019-07-09 PROCEDURE — 99203 OFFICE O/P NEW LOW 30 MIN: CPT | Performed by: INTERNAL MEDICINE

## 2019-07-09 ASSESSMENT — MIFFLIN-ST. JEOR: SCORE: 1298.14

## 2019-07-09 ASSESSMENT — PAIN SCALES - GENERAL: PAINLEVEL: NO PAIN (0)

## 2019-07-09 NOTE — PROGRESS NOTES
GASTROENTEROLOGY NEW PATIENT CLINIC VISIT    CC/REFERRING MD:    Asmita Bhatia    REASON FOR CONSULTATION:   Asmita Bhatia for   Chief Complaint   Patient presents with     Consult     Stomach cramping starting in the middle and expand out into the sides.        HISTORY OF PRESENT ILLNESS:    Lashonda Rivera is 48 year old female who presents for evaluation of abdominal pain.  She notes that she had an episode of about 6 weeks of abdominal pain earlier this year.  Back in March 2019 she had severe periumbilical abdominal pain with radiation to the sides.  She did receive an evaluation in the emergency department which had unremarkable labs as well as a CT scan which did not show any specific pathology other than a complex cystic structure in the left adnexa.  She followed up with OB GYN who did not feel that this cystic structure would account for her abdominal pain.  A subsequent colonoscopy was unremarkable.  She notes that during the time of the abdominal pain, there were no changes with dietary intake and there were no significant changes with bowel movements.  She was given omeprazole during this time which she reports did not significantly improve the pain.  She does note that she had some issues with constipation and has been using Metamucil and also occasionally MiraLAX.  She did do a liquid diet during this time which she feels helped her symptoms.  The duration of symptoms lasted approximately 6 weeks.  Currently, she notes that she has no abdominal pain.  She notes that occasionally if she has more constipation, she does get some very mild left lower quadrant abdominal cramping though this is insignificant compared to her previous abdominal pain episodes.  There are no medication changes.  She does take occasional NSAIDs 1-2 times monthly for symptoms of migraines.  There is no other abdominal surgical history.  Family history is unremarkable for GI malignancies or inflammatory bowel  disease.    PROBLEM LIST  Patient Active Problem List    Diagnosis Date Noted     Adnexal cyst 10/21/2017     Priority: Medium     TBI (traumatic brain injury), without LOC, sequela 09/12/2017     Priority: Medium     Seasonal allergic rhinitis, unspecified allergic rhinitis trigger 10/12/2016     Priority: Medium     Adjustment disorder with depressed mood 01/19/2016     Priority: Medium     Migraine headache 10/02/2012     Priority: Medium     Tension headache 01/17/2011     Priority: Medium     (Problem list name updated by automated process. Provider to review and confirm.)       CARDIOVASCULAR SCREENING; LDL GOAL LESS THAN 160 10/31/2010     Priority: Medium       PERTINENT PAST MEDICAL HISTORY:  (I personally reviewed this history with the patient at today's visit)   Past Medical History:   Diagnosis Date     Adjustment disorder with depressed mood 1/19/2016     Breast fibroadenoma, right 2016    excised     Complication of anesthesia     nausea/ use scop. patches     H/O: hysterectomy 2013     Hypothyroidism 2005    Now resolved     Leiomyoma of uterus, unspecified 2013    resolved     Menorrhagia 2000    resolved     Migraine headache 10/2/2012     Recurrent UTI 2012     TBI (traumatic brain injury), without LOC, sequela 9/12/2017     Vitamin B 12 deficiency 2005    Resolved         PREVIOUS SURGERIES: (I personally reviewed this history with the patient at today's visit)   Past Surgical History:   Procedure Laterality Date     BIOPSY BREAST Right 6/22/2016    Procedure: BIOPSY BREAST;  Surgeon: Kaiser Roy MD;  Location: [facility] TOTAL ABDOM HYSTERECTOMY  06/11/2013    benign fibroids     C TREAT ECTOPIC PREG,RMV TUBE/OVARY  1995     COLONOSCOPY WITH CO2 INSUFFLATION N/A 4/4/2019    Procedure: COLONOSCOPY WITH CO2 INSUFFLATION;  Surgeon: Moise Ingram MD;  Location: [facility] OR     HYSTERECTOMY, PAP NO LONGER INDICATED       SKIN GRAFT, EACH ADDN 100SQCM  1977    buttocks to left  foot      SURGICAL HISTORY OF -   2004    L wrist tendon         ALLERGIES:     Allergies   Allergen Reactions     Vicodin [Hydrocodone-Acetaminophen]      itchy       PERTINENT MEDICATIONS:    Current Outpatient Medications:      albuterol (PROAIR HFA/PROVENTIL HFA/VENTOLIN HFA) 108 (90 BASE) MCG/ACT Inhaler, Inhale 2 puffs into the lungs every 4 hours as needed, Disp: 1 Inhaler, Rfl: 0     cyclobenzaprine (FLEXERIL) 5 MG tablet, Take 1 tablet (5 mg) by mouth 2 times daily as needed for muscle spasms, Disp: 30 tablet, Rfl: 1     docusate sodium (COLACE) 100 MG tablet, Take 1 tablet (100 mg) by mouth 2 times daily as needed for constipation, Disp: 60 tablet, Rfl: 0     fluticasone (FLONASE) 50 MCG/ACT nasal spray, Spray 1-2 sprays into both nostrils daily, Disp: 16 g, Rfl: 0     Multiple Vitamins-Minerals (HAIR SKIN NAILS) CAPS, , Disp: , Rfl:      NAPROXEN 500 MG TBEC, TK 1 T PO BID PRF HEADACHES, Disp: , Rfl: 3     omeprazole 20 MG tablet, Take 1 tablet (20 mg) by mouth daily Take 30-60 minutes before a meal., Disp: 30 tablet, Rfl: 0     Probiotic Product (PROBIOTIC DAILY PO), Take 2 tablets by mouth daily , Disp: , Rfl:      sucralfate (CARAFATE) 1 GM/10ML suspension, Take 10 mLs (1 g) by mouth 4 times daily as needed for nausea, Disp: 420 mL, Rfl: 0     SUMAtriptan (IMITREX) 100 MG tablet, Take 1 tablet (100 mg) by mouth at onset of headache for migraine May repeat in 2 hours if needed: max 2/day, Disp: 18 tablet, Rfl: 1     amoxicillin (AMOXIL) 500 MG capsule, Take 500 mg by mouth 2 times daily, Disp: , Rfl:     SOCIAL HISTORY:  Social History     Socioeconomic History     Marital status:      Spouse name: no partner     Number of children: 2     Years of education: Not on file     Highest education level: Not on file   Occupational History     Occupation:    Social Needs     Financial resource strain: Not on file     Food insecurity:     Worry: Not on file     Inability: Not on file      Transportation needs:     Medical: Not on file     Non-medical: Not on file   Tobacco Use     Smoking status: Never Smoker     Smokeless tobacco: Never Used   Substance and Sexual Activity     Alcohol use: No     Alcohol/week: 0.0 oz     Drug use: No     Sexual activity: Yes     Partners: Male     Birth control/protection: Female Surgical   Lifestyle     Physical activity:     Days per week: Not on file     Minutes per session: Not on file     Stress: Not on file   Relationships     Social connections:     Talks on phone: Not on file     Gets together: Not on file     Attends Jehov[employer]'s witness service: Not on file     Active member of club or organization: Not on file     Attends meetings of clubs or organizations: Not on file     Relationship status: Not on file     Intimate partner violence:     Fear of current or ex partner: Not on file     Emotionally abused: Not on file     Physically abused: Not on file     Forced sexual activity: Not on file   Other Topics Concern     Parent/sibling w/ CABG, MI or angioplasty before 65F 55M? Yes   Social History Narrative     Not on file       FAMILY HISTORY: (I personally reviewed this history with the patient at today's visit)  Family History   Problem Relation Age of Onset     Diabetes Mother      Diabetes Maternal Grandmother      Hypertension Maternal Grandmother      Arthritis Maternal Grandmother      Cancer Maternal Grandmother         bladder cancer/cervix     Cancer Maternal Grandfather         bone     Asthma Son      Unknown/Adopted Paternal Grandfather      Unknown/Adopted Paternal Grandmother      Cervical Cancer Maternal Aunt         ROS:    No fevers or chills  No weight loss  No blurry vision, double vision or change in vision  No sore throat  No lymphadenopathy  No headache, paraesthesias, or weakness in a limb  No shortness of breath or wheezing  No chest pain or pressure  No arthralgias or myalgias  No rashes or skin changes  No odynophagia or dysphagia  No BRBPR,  "hematochezia, melena  No dysuria, frequency or urgency  No hot/cold intolerance or polyria  No anxiety or depression  PHYSICAL EXAMINATION:  Constitutional: aaox3, cooperative, pleasant, not dyspneic/diaphoretic, no acute distress  Vitals reviewed: /80   Pulse 82   Ht 1.6 m (5' 3\")   Wt 68.9 kg (151 lb 14.4 oz)   LMP 06/10/2013   SpO2 100%   BMI 26.91 kg/m    Wt:   Wt Readings from Last 2 Encounters:   07/09/19 68.9 kg (151 lb 14.4 oz)   04/17/19 65.8 kg (145 lb)      Eyes: Sclera anicteric/injected  Ears/nose/mouth/throat: Normal oropharynx without ulcers or exudate, mucus membranes moist, hearing intact  Neck: supple, thyroid normal size  CV: No edema, RRR  Respiratory: Unlabored breathing, CTAB  Lymph: No submandibular, supraclavicular or inguinal lymphadenopathy  Abd: Nondistended, no masses, +bs, no hepatosplenomegaly, nontender, no peritoneal signs  Skin: warm, perfused, no jaundice  Psych: Normal affect  MSK: Normal gait      PERTINENT STUDIES: (I personally reviewed these laboratory studies today)  Most recent CBC:   Recent Labs   Lab Test 10/04/16  1717 06/09/16  0827   WBC 4.0 3.2*   HGB 13.2 12.8   HCT 39.3 37.4    296     Most recent hepatic panel:  Recent Labs   Lab Test 06/09/16  0827 05/12/12   ALT 21 12   AST 15 15     Most recent creatinine:  Recent Labs   Lab Test 10/04/16  1717 06/09/16  0827   CR 0.87 0.89       RADIOLOGY:    1.  No evidence of paracolic inflammation or diverticulitis. Scattered retained stool.  2.  Complex cystic structure measuring up to 6 cm in the left adnexa. Complex ovarian cyst versus hydrosalpinx. Recommend sonographic evaluation.   3.  Right adnexa appears unremarkable.  4.  Solid organs and biliary system are unremarkable.    REPORT SIGNED BY DR. Lyndon Banda   Result Narrative   EXAM:  CT ABDOMEN & PELVIS W CONTRAST  3/14/2019 10:25 AM    CLINICAL DATA: Left lower quadrant pain.. Clinical Information/ICD-10: LLQ pain, suspect " diverticulitis,    COMPARISON: None    TECHNIQUE: Thin-section contiguous transaxial images were obtained through the abdomen and pelvis with intravenous contrast.  Coronal reformations were also obtained through the abdomen and pelvis.  A total of 100 cc of Omnipaque 350 were administered intravenously for this study.    FINDINGS:     Lung Bases: Lung bases are clear.    Solid Organs: No acute solid organ abnormality.    Biliary System: Gallbladder and bile ducts without acute pathology.    Bowel: Retained stool throughout the colon. No localized inflammatory changes. No free air or free fluid. Appendix: No pericecal inflammation. Normal appendix.    Osseous Structures: No acute bony abnormality.    Complex cystic structure in the left adnexa measuring 6.0 x 4.0 x 3.5 cm. Unremarkable CT appearance of the right ovary.   Other Result Information   Interface, [facility] In - 03/14/2019 10:50 AM CDT  EXAM:  CT ABDOMEN & PELVIS W CONTRAST  3/14/2019 10:25 AM    CLINICAL DATA: Left lower quadrant pain.. Clinical Information/ICD-10: LLQ pain, suspect diverticulitis,    COMPARISON: None    TECHNIQUE: Thin-section contiguous transaxial images were obtained through the abdomen and pelvis with intravenous contrast.  Coronal reformations were also obtained through the abdomen and pelvis.  A total of 100 cc of Omnipaque 350 were administered intravenously for this study.    FINDINGS:     Lung Bases: Lung bases are clear.    Solid Organs: No acute solid organ abnormality.    Biliary System: Gallbladder and bile ducts without acute pathology.    Bowel: Retained stool throughout the colon. No localized inflammatory changes. No free air or free fluid. Appendix: No pericecal inflammation. Normal appendix.    Osseous Structures: No acute bony abnormality.    Complex cystic structure in the left adnexa measuring 6.0 x 4.0 x 3.5 cm. Unremarkable CT appearance of the right ovary.    IMPRESSION  IMPRESSION:     1.  No evidence of  paracolic inflammation or diverticulitis. Scattered retained stool.  2.  Complex cystic structure measuring up to 6 cm in the left adnexa. Complex ovarian cyst versus hydrosalpinx. Recommend sonographic evaluation.   3.  Right adnexa appears unremarkable.  4.  Solid organs and biliary system are unremarkable.    REPORT SIGNED BY DR. Lyndon Banda         ASSESSMENT/PLAN:    Lashonda Rivera is a 48 year old female who presents for evaluation of abdominal pain.  The nature of her prior abdominal pain is not definitive.  It is possible that she had infectious gastroenteritis leading to abdominal pain or could be related to underlying constipation.  She is feeling significantly improved at the present time.  We discussed that is very unlikely that there is a serious underlying pathology such as a GI cancer or inflammatory bowel disease.  I recommend continuing monitoring at this point.  She should continue to take Metamucil and MiraLAX in order to have regular bowel movements.  If symptoms recur, she should return to the GI clinic for an updated evaluation at that point.    RTC PRN    Thank you for this consultation.  It was a pleasure to participate in the care of this patient; please contact us with any further questions.      This note was created with voice recognition software, and while reviewed for accuracy, typos may remain.     Pablito Garcia MD  Adjunct  of Medicine  Division of Gastroenterology, Hepatology and Nutrition  [facility]  [phone]

## 2019-08-27 ENCOUNTER — TELEPHONE (OUTPATIENT)
Dept: FAMILY MEDICINE | Facility: CLINIC | Age: 49
End: 2019-08-27

## 2019-08-27 NOTE — TELEPHONE ENCOUNTER
Reason for Call:  Other call back    Detailed comments: Pt returning phone call and would like a phone call back regarding blood type.    Phone Number Patient can be reached at: Home number on file [phone] (home)    Best Time: anytime    Can we leave a detailed message on this number? YES    Call taken on 8/27/2019 at 11:05 AM by Anastacio Love

## 2019-08-27 NOTE — TELEPHONE ENCOUNTER
This writer attempted to contact patient on 08/27/19      Reason for call blood type and left detailed message we don't have record of her blood type so should check hospital where she had surgeries or children.        Georgina Dimas MA

## 2019-08-27 NOTE — TELEPHONE ENCOUNTER
Reason for Call:  Other call back    Detailed comments: Pt calling and is participating in her company's blood drive and would like to know what her blood type is.       Phone Number Patient can be reached at: Home number on file [phone] (home)    Best Time: anytime    Can we leave a detailed message on this number? YES    Call taken on 8/27/2019 at 10:57 AM by Anastacio Love

## 2019-09-20 ENCOUNTER — TELEPHONE (OUTPATIENT)
Dept: FAMILY MEDICINE | Facility: CLINIC | Age: 49
End: 2019-09-20

## 2019-09-20 ENCOUNTER — OFFICE VISIT (OUTPATIENT)
Dept: URGENT CARE | Facility: URGENT CARE | Age: 49
End: 2019-09-20
Payer: COMMERCIAL

## 2019-09-20 VITALS
OXYGEN SATURATION: 100 % | SYSTOLIC BLOOD PRESSURE: 128 MMHG | RESPIRATION RATE: 12 BRPM | BODY MASS INDEX: 26.64 KG/M2 | TEMPERATURE: 97.9 F | DIASTOLIC BLOOD PRESSURE: 84 MMHG | WEIGHT: 150.4 LBS | HEART RATE: 91 BPM

## 2019-09-20 DIAGNOSIS — R05.9 COUGH: ICD-10-CM

## 2019-09-20 DIAGNOSIS — R06.2 WHEEZING: ICD-10-CM

## 2019-09-20 DIAGNOSIS — Z87.09 H/O ALLERGIC RHINITIS: ICD-10-CM

## 2019-09-20 DIAGNOSIS — J01.90 ACUTE SINUSITIS WITH COEXISTING CONDITION, NEED PROPHYLACTIC TREATMENT: Primary | ICD-10-CM

## 2019-09-20 PROCEDURE — 99214 OFFICE O/P EST MOD 30 MIN: CPT | Performed by: PHYSICIAN ASSISTANT

## 2019-09-20 RX ORDER — ALBUTEROL SULFATE 90 UG/1
2 AEROSOL, METERED RESPIRATORY (INHALATION) EVERY 4 HOURS PRN
Qty: 1 INHALER | Refills: 0 | Status: SHIPPED | OUTPATIENT
Start: 2019-09-20 | End: 2022-08-11

## 2019-09-20 RX ORDER — AMOXICILLIN 875 MG
875 TABLET ORAL 2 TIMES DAILY
Qty: 20 TABLET | Refills: 0 | Status: SHIPPED | OUTPATIENT
Start: 2019-09-20 | End: 2020-02-06

## 2019-09-20 RX ORDER — CETIRIZINE HYDROCHLORIDE 10 MG/1
10 TABLET ORAL DAILY
Qty: 30 TABLET | Refills: 0 | Status: SHIPPED | OUTPATIENT
Start: 2019-09-20 | End: 2020-02-06

## 2019-09-20 NOTE — TELEPHONE ENCOUNTER
S-(situation): patient has sore throat, yellow mucous, tight chest and sneezing    B-(background): patient developed these symptoms on Sunday    A-(assessment): she reports that symptoms are worsening and today she has developed extreme fatigue. She has missed 2 days of work this week due to her symptoms. Yellow mucous that she is coughing up is very thick and yellow. She is not sure if she has been running a fever because she does not have a thermometer to check her temp. Her symptoms have been worsening over the course of the past few days.She has been hot and has had chills. She is swallowing ok.     R-(recommendations): recommended clinic visit in next 24 hours. She is agreeable and will report to Urgent Care since there are no appointments left.      Erika Ford RN

## 2019-09-20 NOTE — TELEPHONE ENCOUNTER
Reason for call:  Patient reporting a symptom    Symptom or request: sore throat, tight chest, sneezing, a lot mucus     Duration (how long have symptoms been present): Monday nigth    Have you been treated for this before? Yes    Additional comments: Please call back and advise    Phone Number patient can be reached at:  Home number on file [phone] (home)    Best Time:  any    Can we leave a detailed message on this number:  YES    Call taken on 9/20/2019 at 11:47 AM by Eva Lowe

## 2019-09-20 NOTE — PROGRESS NOTES
S: 48 yo female is here for cough, postnasal drip, sore throat, chest tightness, nasal discharge and headache for 5 days.  Lots of sneezing.  Had seasonal allergic rhinitis mainly as a child.  No history of asthma.  No fever.  No vomiting or diarrhea.  No rash.      Allergies   Allergen Reactions     Vicodin [Hydrocodone-Acetaminophen]      itchy       Past Medical History:   Diagnosis Date     Adjustment disorder with depressed mood 2016     Breast fibroadenoma, right 2016    excised     Complication of anesthesia     nausea/ use scop. patches     H/O: hysterectomy      Hypothyroidism     Now resolved     Leiomyoma of uterus, unspecified     resolved     Menorrhagia     resolved     Migraine headache 10/2/2012     Recurrent UTI      TBI (traumatic brain injury), without LOC, sequela 2017     Vitamin B 12 deficiency     Resolved         Current Outpatient Medications on File Prior to Visit:  albuterol (PROAIR HFA/PROVENTIL HFA/VENTOLIN HFA) 108 (90 BASE) MCG/ACT Inhaler Inhale 2 puffs into the lungs every 4 hours as needed   amoxicillin (AMOXIL) 500 MG capsule Take 500 mg by mouth 2 times daily   cyclobenzaprine (FLEXERIL) 5 MG tablet Take 1 tablet (5 mg) by mouth 2 times daily as needed for muscle spasms   docusate sodium (COLACE) 100 MG tablet Take 1 tablet (100 mg) by mouth 2 times daily as needed for constipation   fluticasone (FLONASE) 50 MCG/ACT nasal spray Spray 1-2 sprays into both nostrils daily   Multiple Vitamins-Minerals (HAIR SKIN NAILS) CAPS    NAPROXEN 500 MG TBEC TK 1 T PO BID PRF HEADACHES   omeprazole 20 MG tablet Take 1 tablet (20 mg) by mouth daily Take 30-60 minutes before a meal.   [] order for DME Place rectally every 12 hours 1.8 oz container of 0.2 % nifedipine creamApply on the anus every 12 hours.  Just get to where the anus is tight .   Probiotic Product (PROBIOTIC DAILY PO) Take 2 tablets by mouth daily    sucralfate (CARAFATE) 1 GM/10ML  suspension Take 10 mLs (1 g) by mouth 4 times daily as needed for nausea   SUMAtriptan (IMITREX) 100 MG tablet Take 1 tablet (100 mg) by mouth at onset of headache for migraine May repeat in 2 hours if needed: max 2/day     No current facility-administered medications on file prior to visit.     Social History     Tobacco Use     Smoking status: Never Smoker     Smokeless tobacco: Never Used   Substance Use Topics     Alcohol use: No     Alcohol/week: 0.0 oz       ROS:  CONSTITUTIONAL: Negative for fatigue or fever.  EYES: Negative for eye problems.  ENT: As above.  RESP: As above.  CV: Negative for chest pains.  GI: Negative for vomiting.  MUSCULOSKELETAL:  Negative for significant muscle or joint pains.  NEUROLOGIC: Negative for headaches.  SKIN: Negative for rash.  PSYCH: Normal mentation for age.    OBJECTIVE:  LMP 06/10/2013   GENERAL APPEARANCE: Healthy, alert and no distress.  EYES:Conjunctiva/sclera clear.  EARS: No cerumen.   Ear canals w/o erythema.  TM's intact w/o erythema.    NOSE/MOUTH: Nasal turbinates are red and inflamed.    SINUSES: Moderate bilateral maxillary sinus tenderness.  THROAT: Mild  erythema w/o tonsillar enlargement . No exudates.  NECK: Supple, nontender, no lymphadenopathy.  RESP: Lungs a few expiratory wheezes both bases.    CV: Regular rate and rhythm, normal S1 S2, no murmur noted.  NEURO: Awake, alert    SKIN: No rashes        ASSESSMENT:     ICD-10-CM    1. Acute sinusitis with coexisting condition, need prophylactic treatment J01.90 cetirizine (ZYRTEC) 10 MG tablet     albuterol (PROAIR HFA/PROVENTIL HFA/VENTOLIN HFA) 108 (90 Base) MCG/ACT inhaler     amoxicillin (AMOXIL) 875 MG tablet   2. Cough R05 cetirizine (ZYRTEC) 10 MG tablet     albuterol (PROAIR HFA/PROVENTIL HFA/VENTOLIN HFA) 108 (90 Base) MCG/ACT inhaler     amoxicillin (AMOXIL) 875 MG tablet   3. H/O allergic rhinitis Z87.09 cetirizine (ZYRTEC) 10 MG tablet     albuterol (PROAIR HFA/PROVENTIL HFA/VENTOLIN HFA) 108  (90 Base) MCG/ACT inhaler     amoxicillin (AMOXIL) 875 MG tablet   4. Wheezing R06.2 cetirizine (ZYRTEC) 10 MG tablet     albuterol (PROAIR HFA/PROVENTIL HFA/VENTOLIN HFA) 108 (90 Base) MCG/ACT inhaler     amoxicillin (AMOXIL) 875 MG tablet           PLAN: It is primary with season.  Likely has some allergic rhinitis with sinusitis.  Also heard some wheezing on exam.  Will treat with Zyrtec, amoxicillin and albuterol inhaler.  Return 5 to 7 days if not better.  I have discussed clinical findings with patient.  Side effects of medications discussed.  Symptomatic care is discussed.  I have discussed the possibility of  worsening symptoms and to RTC or ER if they occur.  All questions are answered and patient is in agreement with plan.   Patient care instructions are given to at the end of visit.   Lots of rest and fluids.    Sheila Cordoba PA-C

## 2019-09-30 ENCOUNTER — HEALTH MAINTENANCE LETTER (OUTPATIENT)
Age: 49
End: 2019-09-30

## 2019-10-29 ENCOUNTER — HEALTH MAINTENANCE LETTER (OUTPATIENT)
Age: 49
End: 2019-10-29

## 2019-11-20 ENCOUNTER — OFFICE VISIT (OUTPATIENT)
Dept: FAMILY MEDICINE | Facility: CLINIC | Age: 49
End: 2019-11-20
Payer: COMMERCIAL

## 2019-11-20 VITALS
OXYGEN SATURATION: 100 % | DIASTOLIC BLOOD PRESSURE: 79 MMHG | HEART RATE: 83 BPM | HEIGHT: 63 IN | BODY MASS INDEX: 26.4 KG/M2 | RESPIRATION RATE: 18 BRPM | TEMPERATURE: 97.7 F | WEIGHT: 149 LBS | SYSTOLIC BLOOD PRESSURE: 136 MMHG

## 2019-11-20 DIAGNOSIS — N64.4 BREAST PAIN, RIGHT: ICD-10-CM

## 2019-11-20 DIAGNOSIS — Z00.00 ROUTINE GENERAL MEDICAL EXAMINATION AT A HEALTH CARE FACILITY: Primary | ICD-10-CM

## 2019-11-20 LAB
CHOLEST SERPL-MCNC: 186 MG/DL
GLUCOSE SERPL-MCNC: 71 MG/DL (ref 70–99)
HDLC SERPL-MCNC: 51 MG/DL
LDLC SERPL CALC-MCNC: 120 MG/DL
NONHDLC SERPL-MCNC: 135 MG/DL
TRIGL SERPL-MCNC: 77 MG/DL

## 2019-11-20 PROCEDURE — 99396 PREV VISIT EST AGE 40-64: CPT | Performed by: PREVENTIVE MEDICINE

## 2019-11-20 PROCEDURE — 82947 ASSAY GLUCOSE BLOOD QUANT: CPT | Performed by: PREVENTIVE MEDICINE

## 2019-11-20 PROCEDURE — 80061 LIPID PANEL: CPT | Performed by: PREVENTIVE MEDICINE

## 2019-11-20 PROCEDURE — 36415 COLL VENOUS BLD VENIPUNCTURE: CPT | Performed by: PREVENTIVE MEDICINE

## 2019-11-20 PROCEDURE — 99213 OFFICE O/P EST LOW 20 MIN: CPT | Mod: 25 | Performed by: PREVENTIVE MEDICINE

## 2019-11-20 ASSESSMENT — MIFFLIN-ST. JEOR: SCORE: 1279.99

## 2019-11-20 ASSESSMENT — PAIN SCALES - GENERAL: PAINLEVEL: SEVERE PAIN (6)

## 2019-11-20 NOTE — PATIENT INSTRUCTIONS
At [facility], we strive to deliver an exceptional experience to you, every time we see you.  If you receive a survey in the mail, please send us back your thoughts. We really do value your feedback.    Based on your medical history, these are the current health maintenance/preventive care services that you are due for (some may have been done at this visit.)  Health Maintenance Due   Topic Date Due     PHQ-2  01/01/2019     PREVENTIVE CARE VISIT  09/14/2019         Suggested websites for health information:  Www.[employer].org : Up to date and easily searchable information on multiple topics.  Www.PriceMe.gov : medication info, interactive tutorials, watch real surgeries online  Www.familydoctor.org : good info from the Academy of Family Physicians  Www.cdc.gov : public health info, travel advisories, epidemics (H1N1)  Www.aap.org : children's health info, normal development, vaccinations  Www.health.[facility].[place].us : [place] dept of health, public health issues in [place], N1N1    Your care team:                            Family Medicine Internal Medicine   MD Hussain Albright MD Shantel Branch-Fleming, MD Katya Georgiev PA-C Nam Ho, MD Pediatrics   Octavio Tabares PABARTOLO Valdez, CNP Kim KOLB CNP   MD Tiffani Gordon MD Deborah Mielke, MD Kim Thein, APRN [facility]      Clinic hours: Monday - Thursday 7 am-7 pm; Fridays 7 am-5 pm.   Urgent care: Monday - Friday 11 am-9 pm; Saturday and Sunday 9 am-5 pm.  Pharmacy : Monday -Thursday 8 am-8 pm; Friday 8 am-6 pm; Saturday and Sunday 9 am-5 pm.     Clinic: [phone]   Pharmacy: [phone]      Preventive Health Recommendations  Female Ages 40 to 49    Yearly exam:     See your health care provider every year in order to  1. Review health changes.   2. Discuss preventive care.    3. Review your medicines if your doctor prescribed any.      Get a Pap test every three years (unless you have an  abnormal result and your provider advises testing more often).      If you get Pap tests with HPV test, you only need to test every 5 years, unless you have an abnormal result. You do not need a Pap test if your uterus was removed (hysterectomy) and you have not had cancer.      You should be tested each year for STDs (sexually transmitted diseases), if you're at risk.     Ask your doctor if you should have a mammogram.      Have a colonoscopy (test for colon cancer) if someone in your family has had colon cancer or polyps before age 50.       Have a cholesterol test every 5 years.       Have a diabetes test (fasting glucose) after age 45. If you are at risk for diabetes, you should have this test every 3 years.    Shots: Get a flu shot each year. Get a tetanus shot every 10 years.     Nutrition:     Eat at least 5 servings of fruits and vegetables each day.    Eat whole-grain bread, whole-wheat pasta and brown rice instead of white grains and rice.    Get adequate Calcium and Vitamin D.      Lifestyle    Exercise at least 150 minutes a week (an average of 30 minutes a day, 5 days a week). This will help you control your weight and prevent disease.    Limit alcohol to one drink per day.    No smoking.     Wear sunscreen to prevent skin cancer.    See your dentist every six months for an exam and cleaning.

## 2019-11-20 NOTE — PROGRESS NOTES
SUBJECTIVE:   CC: Lashonda Rivera is an 49 year old woman who presents for preventive health visit.     Healthy Habits:    Do you get at least three servings of calcium containing foods daily (dairy, green leafy vegetables, etc.)? yes    Amount of exercise or daily activities, outside of work: 7 day(s) per week 30 minutes or longer    Problems taking medications regularly No    Medication side effects: No    Have you had an eye exam in the past two years? Yes last done 2-3 mo nths ago    Do you see a dentist twice per year? yes    Do you have sleep apnea, excessive snoring or daytime drowsiness?no    Concern - breast pain on the right side   Onset: 1.5week    Description:   Patient complains of new pain right breast dull ache    Intensity: mild    Progression of Symptoms:  same    Accompanying Signs & Symptoms:  none    Previous history of similar problem:   Yes right breast lump removed 3 years ago-benign    Precipitating factors:   Worsened by: none    Alleviating factors:  Improved by: none    Therapies Tried and outcome: none  Aching+  No nipple discharge  No trauma  No fever or chills  No rash  Last year had a more burning sort of pain, imaging did not show any abnormalities at that time   No pain in the armpit or lumps  No family history of breast cancer in immediate family     Today's PHQ-2 Score:   PHQ-2 ( 1999 [employer]) 11/20/2019 3/26/2018   Q1: Little interest or pleasure in doing things 0 0   Q2: Feeling down, depressed or hopeless 0 0   PHQ-2 Score 0 0       Abuse: Current or Past(Physical, Sexual or Emotional)- NO  Do you feel safe in your environment? Yes        Social History     Tobacco Use     Smoking status: Never Smoker     Smokeless tobacco: Never Used   Substance Use Topics     Alcohol use: No     Alcohol/week: 0.0 standard drinks     If you drink alcohol do you typically have >3 drinks per day or >7 drinks per week? No                     Reviewed orders with patient.  Reviewed health  maintenance and updated orders accordingly - Yes  Lab work is in process  Labs reviewed in EPIC  BP Readings from Last 3 Encounters:   11/20/19 136/79   09/20/19 128/84   07/09/19 119/80    Wt Readings from Last 3 Encounters:   11/20/19 67.6 kg (149 lb)   09/20/19 68.2 kg (150 lb 6.4 oz)   07/09/19 68.9 kg (151 lb 14.4 oz)                  Patient Active Problem List   Diagnosis     CARDIOVASCULAR SCREENING; LDL GOAL LESS THAN 160     Tension headache     Migraine headache     Adjustment disorder with depressed mood     Seasonal allergic rhinitis, unspecified allergic rhinitis trigger     TBI (traumatic brain injury), without LOC, sequela     Adnexal cyst     Past Surgical History:   Procedure Laterality Date     BIOPSY BREAST Right 6/22/2016    Procedure: BIOPSY BREAST;  Surgeon: Kaiser Roy MD;  Location: [facility]     C TOTAL ABDOM HYSTERECTOMY  06/11/2013    benign fibroids     C TREAT ECTOPIC PREG,RMV TUBE/OVARY  1995     COLONOSCOPY WITH CO2 INSUFFLATION N/A 4/4/2019    Procedure: COLONOSCOPY WITH CO2 INSUFFLATION;  Surgeon: Moise Ingram MD;  Location: [facility]     HYSTERECTOMY, PAP NO LONGER INDICATED       SKIN GRAFT, EACH ADDN 100SQCM  1977    buttocks to left  foot     SURGICAL HISTORY OF -   2004    L wrist tendon       Social History     Tobacco Use     Smoking status: Never Smoker     Smokeless tobacco: Never Used   Substance Use Topics     Alcohol use: No     Alcohol/week: 0.0 standard drinks     Family History   Problem Relation Age of Onset     Diabetes Mother      Diabetes Maternal Grandmother      Hypertension Maternal Grandmother      Arthritis Maternal Grandmother      Cancer Maternal Grandmother         bladder cancer/cervix     Cancer Maternal Grandfather         bone     Asthma Son      Unknown/Adopted Paternal Grandfather      Unknown/Adopted Paternal Grandmother      Cervical Cancer Maternal Aunt          Current Outpatient Medications   Medication Sig Dispense Refill      albuterol (PROAIR HFA/PROVENTIL HFA/VENTOLIN HFA) 108 (90 Base) MCG/ACT inhaler Inhale 2 puffs into the lungs every 4 hours as needed for shortness of breath / dyspnea or wheezing 1 Inhaler 0     albuterol (PROAIR HFA/PROVENTIL HFA/VENTOLIN HFA) 108 (90 BASE) MCG/ACT Inhaler Inhale 2 puffs into the lungs every 4 hours as needed 1 Inhaler 0     amoxicillin (AMOXIL) 500 MG capsule Take 500 mg by mouth 2 times daily       cetirizine (ZYRTEC) 10 MG tablet Take 1 tablet (10 mg) by mouth daily 30 tablet 0     cyclobenzaprine (FLEXERIL) 5 MG tablet Take 1 tablet (5 mg) by mouth 2 times daily as needed for muscle spasms 30 tablet 1     docusate sodium (COLACE) 100 MG tablet Take 1 tablet (100 mg) by mouth 2 times daily as needed for constipation 60 tablet 0     fluticasone (FLONASE) 50 MCG/ACT nasal spray Spray 1-2 sprays into both nostrils daily 16 g 0     Multiple Vitamins-Minerals (HAIR SKIN NAILS) CAPS        NAPROXEN 500 MG TBEC TK 1 T PO BID PRF HEADACHES  3     omeprazole 20 MG tablet Take 1 tablet (20 mg) by mouth daily Take 30-60 minutes before a meal. 30 tablet 0     Probiotic Product (PROBIOTIC DAILY PO) Take 2 tablets by mouth daily        sucralfate (CARAFATE) 1 GM/10ML suspension Take 10 mLs (1 g) by mouth 4 times daily as needed for nausea 420 mL 0     SUMAtriptan (IMITREX) 100 MG tablet Take 1 tablet (100 mg) by mouth at onset of headache for migraine May repeat in 2 hours if needed: max 2/day 18 tablet 1     Allergies   Allergen Reactions     Vicodin [Hydrocodone-Acetaminophen]      itchy       Mammogram Screening: Patient under age 50, mutual decision reflected in health maintenance.      Pertinent mammograms are reviewed under the imaging tab.  History of abnormal Pap smear: Status post benign hysterectomy. Health Maintenance and Surgical History updated.  PAP / HPV 9/4/2012 10/16/2009   PAP NIL NIL     Reviewed and updated as needed this visit by clinical staff  Tobacco  Allergies  Meds  Problems   Med Hx  Surg Hx  Fam Hx         Reviewed and updated as needed this visit by Provider  Tobacco  Allergies  Meds  Problems  Med Hx  Surg Hx  Fam Hx        Past Medical History:   Diagnosis Date     Adjustment disorder with depressed mood 1/19/2016     Breast fibroadenoma, right 2016    excised     Complication of anesthesia     nausea/ use scop. patches     H/O: hysterectomy 2013     Hypothyroidism 2005    Now resolved     Leiomyoma of uterus, unspecified 2013    resolved     Menorrhagia 2000    resolved     Migraine headache 10/2/2012     Recurrent UTI 2012     TBI (traumatic brain injury), without LOC, sequela 9/12/2017     Vitamin B 12 deficiency 2005    Resolved      Past Surgical History:   Procedure Laterality Date     BIOPSY BREAST Right 6/22/2016    Procedure: BIOPSY BREAST;  Surgeon: Kaiesr Roy MD;  Location:  [facility]     C TOTAL ABDOM HYSTERECTOMY  06/11/2013    benign fibroids     C TREAT ECTOPIC PREG,RMV TUBE/OVARY  1995     COLONOSCOPY WITH CO2 INSUFFLATION N/A 4/4/2019    Procedure: COLONOSCOPY WITH CO2 INSUFFLATION;  Surgeon: Moise Ingram MD;  Location: [facility]     HYSTERECTOMY, PAP NO LONGER INDICATED       SKIN GRAFT, EACH ADDN 100SQCM  1977    buttocks to left  foot     SURGICAL HISTORY OF -   2004    L wrist tendon       ROS:  CONSTITUTIONAL: NEGATIVE for fever, chills, change in weight  INTEGUMENTARU/SKIN: NEGATIVE for worrisome rashes, moles or lesions  EYES: NEGATIVE for vision changes or irritation  ENT: NEGATIVE for ear, mouth and throat problems  RESP: NEGATIVE for significant cough or SOB  CV: NEGATIVE for chest pain, palpitations or peripheral edema  GI: NEGATIVE for nausea, abdominal pain, heartburn, or change in bowel habits  MUSCULOSKELETAL: NEGATIVE for significant arthralgias or myalgia  NEURO: NEGATIVE for weakness, dizziness or paresthesias  ENDOCRINE: NEGATIVE for temperature intolerance, skin/hair changes  HEME/ALLERGY/IMMUNE: NEGATIVE for bleeding  "problems  PSYCHIATRIC: NEGATIVE for changes in mood or affect    OBJECTIVE:   /79 (BP Location: Left arm, Patient Position: Chair, Cuff Size: Adult Regular)   Pulse 83   Temp 97.7  F (36.5  C) (Oral)   Resp 18   Ht 1.6 m (5' 3\")   Wt 67.6 kg (149 lb)   LMP 06/10/2013   SpO2 100%   BMI 26.39 kg/m    EXAM:  GENERAL: healthy, alert and no distress  EYES: Eyes grossly normal to inspection, PERRL and conjunctivae and sclerae normal  HENT: ear canals and TM's normal, nose and mouth without ulcers or lesions  NECK: no adenopathy, no asymmetry, masses  RESP: lungs clear to auscultation - no rales, rhonchi or wheezes  BREAST: normal without masses, tenderness or nipple discharge and no palpable axillary masses or adenopathy, tenderness and slight nodularity noted on the right breast inner medial compartment, no skin changes. 2 o' clock position   CV: regular rate and rhythm, normal S1 S2, no S3 or S4, no murmur, click or rub, no peripheral edema and peripheral pulses strong  ABDOMEN: soft, nontender, no rebound or guarding   MS: no gross musculoskeletal defects noted, no edema, bunions noted on both feet R >L   SKIN: no suspicious lesions or rashes  NEURO: Normal strength and tone, mentation intact and speech normal  PSYCH: mentation appears normal, affect normal/bright  LYMPH: no cervical, supraclavicular, axillary adenopathy    Diagnostic Test Results:  Labs reviewed in Epic  No results found for this or any previous visit (from the past 24 hour(s)).    ASSESSMENT/PLAN:   Lashonda was seen today for physical and breast pain.    Diagnoses and all orders for this visit:    Routine general medical examination at a health care facility  -     Lipid panel reflex to direct LDL Fasting  -     Glucose  -Flu vaccine done at work     Breast pain, right  -     MA Diagnostic Digital Bilateral; Future  -     US Breast Right Limited 1-3 Quadrants; Future  -await results of imaging         COUNSELING:   Reviewed preventive " "health counseling, as reflected in patient instructions       Regular exercise       Healthy diet/nutrition    Estimated body mass index is 26.39 kg/m  as calculated from the following:    Height as of this encounter: 1.6 m (5' 3\").    Weight as of this encounter: 67.6 kg (149 lb).    Weight management plan: Discussed healthy diet and exercise guidelines     reports that she has never smoked. She has never used smokeless tobacco.      Counseling Resources:  ATP IV Guidelines  Pooled Cohorts Equation Calculator  Breast Cancer Risk Calculator  FRAX Risk Assessment  ICSI Preventive Guidelines  Dietary Guidelines for Americans, 2010  USDA's MyPlate  ASA Prophylaxis  Lung CA Screening    Asmita Bhatia MD MPH    [facility]  "

## 2019-11-21 NOTE — RESULT ENCOUNTER NOTE
Lashonda, your test results were within normal limits.  Cholesterol is at goal for you.  Glucose is normal, you do not have diabetes.     Please do not hesitate to call us at [phone] if you have any questions or concerns.    Thank you,    Asmita Bhatia MD MPH

## 2019-12-02 ENCOUNTER — ANCILLARY PROCEDURE (OUTPATIENT)
Dept: MAMMOGRAPHY | Facility: CLINIC | Age: 49
End: 2019-12-02
Attending: PREVENTIVE MEDICINE
Payer: COMMERCIAL

## 2019-12-02 ENCOUNTER — ANCILLARY PROCEDURE (OUTPATIENT)
Dept: ULTRASOUND IMAGING | Facility: CLINIC | Age: 49
End: 2019-12-02
Attending: PREVENTIVE MEDICINE
Payer: COMMERCIAL

## 2019-12-02 DIAGNOSIS — N64.4 BREAST PAIN, RIGHT: ICD-10-CM

## 2019-12-02 PROCEDURE — 77066 DX MAMMO INCL CAD BI: CPT

## 2019-12-02 PROCEDURE — G0279 TOMOSYNTHESIS, MAMMO: HCPCS

## 2019-12-02 PROCEDURE — 76642 ULTRASOUND BREAST LIMITED: CPT | Mod: RT

## 2020-02-06 ENCOUNTER — OFFICE VISIT (OUTPATIENT)
Dept: FAMILY MEDICINE | Facility: CLINIC | Age: 50
End: 2020-02-06
Payer: COMMERCIAL

## 2020-02-06 VITALS
OXYGEN SATURATION: 98 % | DIASTOLIC BLOOD PRESSURE: 82 MMHG | SYSTOLIC BLOOD PRESSURE: 130 MMHG | TEMPERATURE: 98.4 F | HEIGHT: 63 IN | RESPIRATION RATE: 16 BRPM | BODY MASS INDEX: 26.22 KG/M2 | HEART RATE: 102 BPM | WEIGHT: 148 LBS

## 2020-02-06 DIAGNOSIS — S06.9X0S TRAUMATIC BRAIN INJURY, WITHOUT LOSS OF CONSCIOUSNESS, SEQUELA (H): ICD-10-CM

## 2020-02-06 DIAGNOSIS — G43.909 MIGRAINE WITHOUT STATUS MIGRAINOSUS, NOT INTRACTABLE, UNSPECIFIED MIGRAINE TYPE: Primary | ICD-10-CM

## 2020-02-06 PROCEDURE — 96372 THER/PROPH/DIAG INJ SC/IM: CPT | Performed by: PHYSICIAN ASSISTANT

## 2020-02-06 PROCEDURE — 99214 OFFICE O/P EST MOD 30 MIN: CPT | Mod: 25 | Performed by: PHYSICIAN ASSISTANT

## 2020-02-06 RX ORDER — METOCLOPRAMIDE 10 MG/1
10 TABLET ORAL 2 TIMES DAILY PRN
Qty: 20 TABLET | Refills: 1 | Status: SHIPPED | OUTPATIENT
Start: 2020-02-06 | End: 2023-03-23

## 2020-02-06 RX ORDER — KETOROLAC TROMETHAMINE 30 MG/ML
60 INJECTION, SOLUTION INTRAMUSCULAR; INTRAVENOUS ONCE
Status: COMPLETED | OUTPATIENT
Start: 2020-02-06 | End: 2020-02-06

## 2020-02-06 RX ORDER — METOCLOPRAMIDE HYDROCHLORIDE 5 MG/ML
10 INJECTION INTRAMUSCULAR; INTRAVENOUS ONCE
Status: COMPLETED | OUTPATIENT
Start: 2020-02-06 | End: 2020-02-06

## 2020-02-06 RX ADMIN — METOCLOPRAMIDE HYDROCHLORIDE 10 MG: 5 INJECTION INTRAMUSCULAR; INTRAVENOUS at 12:02

## 2020-02-06 RX ADMIN — KETOROLAC TROMETHAMINE 60 MG: 30 INJECTION, SOLUTION INTRAMUSCULAR; INTRAVENOUS at 12:01

## 2020-02-06 ASSESSMENT — MIFFLIN-ST. JEOR: SCORE: 1275.45

## 2020-02-06 ASSESSMENT — PAIN SCALES - GENERAL: PAINLEVEL: SEVERE PAIN (6)

## 2020-02-06 NOTE — PATIENT INSTRUCTIONS
Patient Education     Migraine Headache  This often severe type of headache is different from other types of headaches in that symptoms other than pain occur with the headache. Nausea and vomiting, lightheadedness, sensitivity to light (photophobia), and other visual disturbances are common migraine symptoms. The pain may last from a few hours to several days. It is not clear why migraines occur but certain factors called  triggers  can raise the risk of having a migraine attack. A migraine may be triggered by emotional stress or depression, or by hormone changes during the menstrual cycle. Other triggers include birth control pills, overuse of migraine medicines, alcohol or caffeine, foods with tyramine (such as aged cheese and wine), eyestrain, weather changes, missed meals, or too little or too much sleep.  Home care  Follow these tips when taking care of yourself at home:    Don t drive yourself home if you were given pain medicine for your headache or are having visual symptoms. Instead, have someone else drive you home. Try to sleep when you get home. You should feel much better when you wake up.    Cold can help ease migraine symptoms. Put an ice pack on your forehead or at the base of your skull. Put heat on the back of your neck to help ease any neck spasm.    Drink only clear liquids or eat a light diet until your symptoms get better. This will help you avoid nausea and vomiting.  How to prevent migraines  Pay attention to what seems to trigger your headache. Try to avoid the triggers when you can. If you have frequent headaches, consider keeping a headache diary. In it, write down what you were doing, feeling, or eating in the hours before each headache. Show this to your healthcare provider to help find the cause of your headaches.  If stress seems to be a trigger for your headaches, figure out what is causing stress in your life. Learn new ways to handle your stress. Ideas include regular exercise,  biofeedback, self-hypnosis, yoga, and meditation. Talk with your healthcare provider to find out more information about managing stress. Many books and digital media are also available on this subject.  Tyramine is a substance found in many foods. It can trigger a migraine in some people. These foods contain tyramine:    Chocolate    Yogurt    All cheeses, but especially aged cheeses    Smoked or pickled fish and meat, including herring, caviar, bologna, pepperoni, and salami    Liver    Avocados    Bananas    Figs    Raisins    Red wine  Try staying away from these foods for 1 to 2 months to see if you have fewer headaches.  How to treat future headaches    Take time out at the first sign of a headache, if possible. Find a quiet, dark, comfortable place to sit or lie down. Let yourself relax or sleep.    Put an ice pack on your forehead or on the area of greatest pain. A heating pad and massage may help if you are having a muscle spasm and tightness in your neck.    If you have been prescribed a medicine to stop a migraine headache, use this at the first warning sign of the headache for best results. First signs may be an aura or pain.    If you need to take medicine often for your migraine, talk with your healthcare provider about other ways to prevent your headaches.  Follow-up care  Follow up with your healthcare provider, or as advised. Talk with your provider if you have frequent headaches. He or she can figure out a treatment plan. Ask if you can have medicine to take at home the next time you get a bad headache. This may keep you from having to visit the emergency department in the future. You may need to see a headache specialist (neurologist) if you continue to have headaches.  When to seek medical advice  Call your healthcare provider right away if any of these occur:    Your head pain gets worse, or doesn t get better within 24 hours    You can t keep liquids down (repeated vomiting)    Pain in your  sinuses, ears, or throat    Fever of 100.4  F (38  C) or higher, or as directed by your healthcare provider    Stiff neck    Extreme drowsiness, confusion, or fainting    Dizziness, or dizziness with spinning sensation (vertigo)    Weakness in an arm or leg, or on one side of your face    Difficulty talking or seeing  Date Last Reviewed: 8/1/2016 2000-2019 [employer]. [address] [place] [address]. All rights reserved. This information is not intended as a substitute for professional medical care. Always follow your healthcare professional's instructions.
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
## 2020-02-06 NOTE — LETTER
[address] [address]  Phone: [phone]    February 6, 2020        Lashonda Rivera  [address]   [facility] [address]          To whom it may concern:    RE: Lashonda Rivera    Patient was seen and treated today at our clinic.  Patient excused from work today and tomorrow.    Patient may return to work without restrictions after that.          Please contact me for questions or concerns.      Sincerely,        GUICHO Thorne

## 2020-02-06 NOTE — NURSING NOTE
Clinic Administered Medication Documentation    MEDICATION LIST:   Injectable Medication Documentation    Patient was given Ketorolac Tromethamine (Toradol) and Reglan. Prior to medication administration, verified patients identity using patient s name and date of birth. Please see MAR and medication order for additional information. Patient instructed to remain in clinic for 15 minutes.      Was entire vial of medication used? Yes  Vial/Syringe: Single dose vial  Expiration Date: (Toradol) 07/2021 and (Reglan) 01/2021  Was this medication supplied by the patient? No     Elfego Latif MA on 2/6/2020 at 12:04 PM

## 2020-02-28 ENCOUNTER — OFFICE VISIT (OUTPATIENT)
Dept: FAMILY MEDICINE | Facility: CLINIC | Age: 50
End: 2020-02-28
Payer: COMMERCIAL

## 2020-02-28 ENCOUNTER — NURSE TRIAGE (OUTPATIENT)
Dept: FAMILY MEDICINE | Facility: CLINIC | Age: 50
End: 2020-02-28

## 2020-02-28 VITALS
TEMPERATURE: 98.1 F | RESPIRATION RATE: 18 BRPM | OXYGEN SATURATION: 98 % | DIASTOLIC BLOOD PRESSURE: 75 MMHG | BODY MASS INDEX: 26.4 KG/M2 | WEIGHT: 149 LBS | HEART RATE: 90 BPM | SYSTOLIC BLOOD PRESSURE: 125 MMHG | HEIGHT: 63 IN

## 2020-02-28 DIAGNOSIS — J11.1 INFLUENZA-LIKE ILLNESS: Primary | ICD-10-CM

## 2020-02-28 LAB
DEPRECATED S PYO AG THROAT QL EIA: NEGATIVE
SPECIMEN SOURCE: NORMAL
SPECIMEN SOURCE: NORMAL
STREP GROUP A PCR: NOT DETECTED

## 2020-02-28 PROCEDURE — 87651 STREP A DNA AMP PROBE: CPT | Performed by: NURSE PRACTITIONER

## 2020-02-28 PROCEDURE — 99213 OFFICE O/P EST LOW 20 MIN: CPT | Performed by: NURSE PRACTITIONER

## 2020-02-28 PROCEDURE — 40001204 ZZHCL STATISTIC STREP A RAPID: Performed by: NURSE PRACTITIONER

## 2020-02-28 ASSESSMENT — MIFFLIN-ST. JEOR: SCORE: 1279.99

## 2020-02-28 ASSESSMENT — PAIN SCALES - GENERAL: PAINLEVEL: SEVERE PAIN (6)

## 2020-02-28 NOTE — TELEPHONE ENCOUNTER
Reason for call:  Patient reporting a symptom    Symptom or request: Flu Symptoms    Duration (how long have symptoms been present): on going    Have you been treated for this before? Yes    Additional comments: Pt has a sore throat, running nose, chills, and coughing up phlegm plus her ears hurt.  She would like a call back to advise.      Phone Number patient can be reached at:  Home number on file [phone] (home)    Best Time:  anytime    Can we leave a detailed message on this number:  YES    Call taken on 2/28/2020 at 12:23 PM by Anastacio Love

## 2020-02-28 NOTE — PROGRESS NOTES
Subjective     Lashonda Rivera is a 49 year old female who presents to clinic today for the following health issues:    HPI   Acute Illness   Acute illness concerns: Cough, runny nose, fatigue  Onset: x4 days    Fever: no     Chills/Sweats: YES    Headache (location?): YES    Sinus Pressure:YES    Conjunctivitis:  no    Ear Pain: YES: left    Rhinorrhea: YES    Congestion: YES    Sore Throat: YES     Cough: YES-productive of yellow sputum    Wheeze: no     Decreased Appetite: no     Nausea: no     Vomiting: no     Diarrhea:  no     Dysuria/Freq.: no     Fatigue/Achiness: YES    Sick/Strep Exposure: YES     Therapies Tried and outcome: Cough drops, benadryl, ibuprofen, nyquil, airborne,  Drinking tea and orange juice     Cough, cold, sore throat, left ear, runny nose, congestion  No fever  Has some chills  Had flu shot  Feels like the flu    Patient Active Problem List   Diagnosis     CARDIOVASCULAR SCREENING; LDL GOAL LESS THAN 160     Tension headache     Migraine headache     Adjustment disorder with depressed mood     Seasonal allergic rhinitis, unspecified allergic rhinitis trigger     TBI (traumatic brain injury), without LOC, sequela     Adnexal cyst     Past Surgical History:   Procedure Laterality Date     BIOPSY BREAST Right 6/22/2016    Procedure: BIOPSY BREAST;  Surgeon: Kaiser Roy MD;  [facility]: [facility]     C TOTAL ABDOM HYSTERECTOMY  06/11/2013    benign fibroids     C TREAT ECTOPIC PREG,RMV TUBE/OVARY  1995     COLONOSCOPY WITH CO2 INSUFFLATION N/A 4/4/2019    Procedure: COLONOSCOPY WITH CO2 INSUFFLATION;  Surgeon: Moise Ingram MD;  Location: [facility]     HYSTERECTOMY, PAP NO LONGER INDICATED       SKIN GRAFT, EACH ADDN 100SQCM  1977    buttocks to left  foot     SURGICAL HISTORY OF -   2004    L wrist tendon       Social History     Tobacco Use     Smoking status: Never Smoker     Smokeless tobacco: Never Used   Substance Use Topics     Alcohol use: No     Alcohol/week: 0.0  "standard drinks     Family History   Problem Relation Age of Onset     Diabetes Mother      Diabetes Maternal Grandmother      Hypertension Maternal Grandmother      Arthritis Maternal Grandmother      Cancer Maternal Grandmother         bladder cancer/cervix     Cancer Maternal Grandfather         bone     Asthma Son      Unknown/Adopted Paternal Grandfather      Unknown/Adopted Paternal Grandmother      Cervical Cancer Maternal Aunt          Current Outpatient Medications   Medication Sig Dispense Refill     albuterol (PROAIR HFA/PROVENTIL HFA/VENTOLIN HFA) 108 (90 Base) MCG/ACT inhaler Inhale 2 puffs into the lungs every 4 hours as needed for shortness of breath / dyspnea or wheezing 1 Inhaler 0     docusate sodium (COLACE) 100 MG tablet Take 1 tablet (100 mg) by mouth 2 times daily as needed for constipation 60 tablet 0     fluticasone (FLONASE) 50 MCG/ACT nasal spray Spray 1-2 sprays into both nostrils daily 16 g 0     metoclopramide (REGLAN) 10 MG tablet Take 1 tablet (10 mg) by mouth 2 times daily as needed (migraine or nausea) 20 tablet 1     Multiple Vitamins-Minerals (HAIR SKIN NAILS) CAPS        NAPROXEN 500 MG TBEC TK 1 T PO BID PRF HEADACHES  3     Probiotic Product (PROBIOTIC DAILY PO) Take 2 tablets by mouth daily        SUMAtriptan (IMITREX) 100 MG tablet Take 1 tablet (100 mg) by mouth at onset of headache for migraine May repeat in 2 hours if needed: max 2/day 18 tablet 1     Allergies   Allergen Reactions     Vicodin [Hydrocodone-Acetaminophen]      itchy         Reviewed and updated as needed this visit by Provider         Review of Systems   ROS COMP: Constitutional, HEENT, cardiovascular, pulmonary, gi and gu systems are negative, except as otherwise noted.      Objective    /75 (BP Location: Right arm, Patient Position: Sitting, Cuff Size: Adult Regular)   Pulse 90   Temp 98.1  F (36.7  C) (Oral)   Resp 18   Ht 1.6 m (5' 3\")   Wt 67.6 kg (149 lb)   LMP 06/10/2013   SpO2 98%   " Breastfeeding No   BMI 26.39 kg/m    Body mass index is 26.39 kg/m .  Physical Exam   GENERAL: healthy, alert and no distress. Hoarse voice  EYES: Eyes grossly normal to inspection, PERRL and conjunctivae and sclerae normal  HENT: ear canals and TM's normal, nose and mouth without ulcers or lesions  NECK: anterior cervical lymphadenopathy  RESP: lungs clear to auscultation - no rales, rhonchi or wheezes  CV: regular rate and rhythm, normal S1 S2, no S3 or S4, no murmur, click or rub, no peripheral edema and peripheral pulses strong  MS: no gross musculoskeletal defects noted, no edema  PSYCH: mentation appears normal, affect normal/bright    Diagnostic Test Results:  Labs reviewed in Epic  Results for orders placed or performed in visit on 02/28/20 (from the past 24 hour(s))   Streptococcus A Rapid Scr w Reflx to PCR   Result Value Ref Range    Strep Specimen Description Throat     Streptococcus Group A Rapid Screen Negative NEG^Negative           Assessment & Plan     1. Influenza-like illness  Didn't test for flu because outside of treatment window and won't affect plan  Negative for strep  Likely influenza  Based on symptoms   Push fluids, rest  Follow up if not improving in 3-5 days, sooner as needed  - Streptococcus A Rapid Scr w Reflx to PCR  - Group A Streptococcus PCR Throat Swab       See Patient Instructions    Return in about 5 days (around 3/4/2020), or if symptoms worsen or fail to improve.     The benefits, risks and potential side effects were discussed in detail. Black box warnings discussed as relevant. All patient questions were answered. The patient was instructed to follow up immediately if any adverse reactions develop.    Return precautions discussed, including when to seek urgent/emergent care.    Patient verbalizes understanding and agrees with plan of care. Patient stable for discharge.      KENNEDI Flowers [facility]

## 2020-02-28 NOTE — TELEPHONE ENCOUNTER
Additional Information    Negative: Severe difficulty breathing (e.g., struggling for each breath, speaks in single words)    Negative: Bluish (or gray) lips or face    Negative: Shock suspected (e.g., cold/pale/clammy skin, too weak to stand, low BP, rapid pulse)    Negative: Sounds like a life-threatening emergency to the triager    Negative: Fever > 104 F (40 C)    Negative: Fever > 101 F (38.3 C) and over 60 years of age    Negative: Fever > 100.0 F (37.8 C) and diabetes mellitus or weak immune system (e.g., HIV positive, cancer chemo, splenectomy, organ transplant, chronic steroids)    Negative: Fever > 100.0 F (37.8 C) and bedridden (e.g., nursing home patient, stroke, chronic illness, recovering from surgery)    Negative: Difficulty breathing that is not severe and not relieved by cleaning out the nose    Negative: Patient sounds very sick or weak to the triager    Negative: Headache and stiff neck (can't touch chin to chest)    Negative: Chest pain (EXCEPTION: MILD central chest pain, present only when coughing)    Negative: Sounds like a cold and there is no fever    Negative: Cough and there is no fever    Negative: Severe cough    Negative: Severe sore throat    Negative: Influenza vaccine reaction is suspected    Throat pain and there is no fever    SEVERE sore throat pain    Negative: Difficulty breathing (per caller) but not severe    Negative: Fever > 103 F (39.4 C)    Negative: Refuses to drink anything for > 12 hours    Negative: Drooling or spitting out saliva (because can't swallow)    Negative: Unable to open mouth completely    Negative: Drinking very little and has signs of dehydration (e.g., no urine > 12 hours, very dry mouth, very lightheaded)    Negative: Patient sounds very sick or weak to the triager    Negative: Throat culture results, call about    Negative: Productive cough is the main symptom    Negative: Runny nose is the main symptom    Answer Assessment - Initial Assessment  "Questions  1. WORST SYMPTOM: \"What is your worst symptom?\" (e.g., cough, runny nose, muscle aches, headache, sore throat, fever)       Cough, runny nose, headache, thick mucus   2. ONSET: \"When did your flu symptoms start?\"       Tuesday  3. COUGH: \"How bad is the cough?\"        Cough suppressant does feel better, yellow phlegm little red in it  4. RESPIRATORY DISTRESS: \"Describe your breathing.\"       When resting feels normal  5. FEVER: \"Do you have a fever?\" If so, ask: \"What is your temperature, how was it measured, and when did it start?\"      Has not taken  6. EXPOSURE: \"Were you exposed to someone with influenza?\"        Not that is known  7. FLU VACCINE: \"Did you get a flu shot this year?\"      Did get flu vaccination  8. HIGH RISK DISEASE: \"Do you any chronic medical problems?\" (e.g., heart or lung disease, asthma, weak immune system, or other HIGH RISK conditions)      no    10. OTHER SYMPTOMS: \"Do you have any other symptoms?\"  (e.g., runny nose, muscle aches, headache, sore throat)        Sore throat, is second worsen symptom    Gena Christian RN, [facility] Triage    Protocols used: INFLUENZA - SEASONAL-A-OH, SORE THROAT-A-OH    "

## 2020-02-28 NOTE — LETTER
February 28, 2020      Lashonda Rivera  [address] [place] [address] [address]   [facility] [address]        To Whom It May Concern:    Lashonda Rivera  was seen on 2/28/2020.  Please excuse her from work today due to contagious illness. She may return to work on Monday as long as no fever for 24 hours.      Sincerely,        KENNEDI Flowers CNP

## 2020-02-28 NOTE — PATIENT INSTRUCTIONS
Negative for strep  Likely influenza  Based on symptoms   Push fluids, rest  Follow up if not improving in 3-5 days, sooner as needed  Patient Education     Influenza (Adult)    Influenza is also called the flu. It is a viral illness that affects the air passages of your lungs. It is different from the common cold. The flu can easily be passed from one to person to another. It may be spread through the air by coughing and sneezing. Or it can be spread by touching the sick person and then touching your own eyes, nose, or mouth.  The flu starts 1 to 3 days after you are exposed to the flu virus. It may last for 1 to 2 weeks but many people feel tired or fatigued for many weeks afterward. You usually don t need to take antibiotics unless you have a complication. This might be an ear or sinus infection or pneumonia.  Symptoms of the flu may be mild or severe. They can include extreme tiredness (wanting to stay in bed all day), chills, fevers, muscle aches, soreness with eye movement, headache, and a dry, hacking cough.  Home care  Follow these guidelines when caring for yourself at home:    Avoid being around cigarette smoke, whether yours or other people s.    Acetaminophen or ibuprofen will help ease your fever, muscle aches, and headache. Don t give aspirin to anyone younger than 18 who has the flu. Aspirin can harm the liver.    Nausea and loss of appetite are common with the flu. Eat light meals. Drink 6 to 8 glasses of liquids every day. Good choices are water, sport drinks, soft drinks without caffeine, juices, tea, and soup. Extra fluids will also help loosen secretions in your nose and lungs.    Over-the-counter cold medicines will not make the flu go away faster. But the medicines may help with coughing, sore throat, and congestion in your nose and sinuses. Don t use a decongestant if you have high blood pressure.    Stay home until your fever has been gone for at least 24 hours without using medicine to  reduce fever.  Follow-up care  Follow up with your healthcare provider, or as advised, if you are not getting better over the next week.  If you are age 65 or older, talk with your provider about getting a pneumococcal vaccine every 5 years. You should also get this vaccine if you have chronic asthma or COPD. All adults should get a flu vaccine every fall. Ask your provider about this.  When to seek medical advice  Call your healthcare provider right away if any of these occur:    Cough with lots of colored mucus (sputum) or blood in your mucus    Chest pain, shortness of breath, wheezing, or trouble breathing    Severe headache, or face, neck, or ear pain    New rash with fever    Fever of 100.4 F (38 C) or higher, or as directed by your healthcare provider    Confusion, behavior change, or seizure    Severe weakness or dizziness    You get a new fever or cough after getting better for a few days  Date Last Reviewed: 1/1/2017 2000-2019 [employer]. [address] [place] [address]. All rights reserved. This information is not intended as a substitute for professional medical care. Always follow your healthcare professional's instructions.

## 2020-02-28 NOTE — TELEPHONE ENCOUNTER
Called and spoke to the patient. She is noting symptoms started Tuesday. She is states she has a cough but that the cough medication helps. After that the sore throat is the worst. It is making it hard to swallow. She states it feels like there is stuff in the back of her throat.     She works a job that she is exposed to a lot of people. Patent is scheduled in clinic for assessment. Informed to mask when entering the building.    Gena Christian RN, [facility] Triage

## 2020-03-03 ENCOUNTER — TELEPHONE (OUTPATIENT)
Dept: FAMILY MEDICINE | Facility: CLINIC | Age: 50
End: 2020-03-03

## 2020-03-03 NOTE — TELEPHONE ENCOUNTER
Reason for Call:  Other Note    Detailed comments: Pt calling to request note to return to work tomorrow due to flu sickness and would like that sent to Pt's [employer] .      Phone Number Patient can be reached at: Home number on file [phone] (home)    Best Time: anytime    Can we leave a detailed message on this number? YES    Call taken on 3/3/2020 at 9:07 AM by Anastacio Love

## 2020-03-03 NOTE — LETTER
March 3, 2020      Lashonda ZACARIAS Nicole  [address] [place] [address] [address]   [facility] [address]        To Whom It May Concern:    Lashonda Rivera  was seen on 2/28/2020.  Please excuse her until 3/4/2020 due to contagious illness.        Sincerely,        KENNEDI Flowers CNP

## 2020-04-02 ENCOUNTER — VIRTUAL VISIT (OUTPATIENT)
Dept: FAMILY MEDICINE | Facility: CLINIC | Age: 50
End: 2020-04-02
Payer: COMMERCIAL

## 2020-04-02 ENCOUNTER — TELEPHONE (OUTPATIENT)
Dept: OBGYN | Facility: CLINIC | Age: 50
End: 2020-04-02

## 2020-04-02 DIAGNOSIS — N30.00 ACUTE CYSTITIS WITHOUT HEMATURIA: Primary | ICD-10-CM

## 2020-04-02 PROCEDURE — 99213 OFFICE O/P EST LOW 20 MIN: CPT | Mod: TEL | Performed by: NURSE PRACTITIONER

## 2020-04-02 RX ORDER — NITROFURANTOIN 25; 75 MG/1; MG/1
100 CAPSULE ORAL 2 TIMES DAILY
Qty: 6 CAPSULE | Refills: 0 | Status: SHIPPED | OUTPATIENT
Start: 2020-04-02 | End: 2020-04-05

## 2020-04-02 NOTE — TELEPHONE ENCOUNTER
Patient is calling for medication for possible UTI. Patient is having lower abdominal pain when finishing up urinating.    Scheduled for appointment.    Gena Christian RN, [facility] Triage

## 2020-04-02 NOTE — PROGRESS NOTES
"Subjective     Lashonda Rivera is a 49 year old female who is being evaluated via a billable telephone visit.      The patient has been notified of following:     \"This telephone visit will be conducted via a call between you and your physician/provider. We have found that certain health care needs can be provided without the need for a physical exam.  This service lets us provide the care you need with a short phone conversation.  If a prescription is necessary we can send it directly to your pharmacy.  If lab work is needed we can place an order for that and you can then stop by our lab to have the test done at a later time.    If during the course of the call the physician/provider feels a telephone visit is not appropriate, you will not be charged for this service.\"     Patient has given verbal consent for Telephone visit?  Yes    Lashonda Rivera complains of   Chief Complaint   Patient presents with     Abdominal Pain     Urinary Problem       ALLERGIES  Vicodin [hydrocodone-acetaminophen]    URINARY TRACT SYMPTOMS  Onset: 1 day ago    Description:   Painful urination (Dysuria): yes           Frequency: no   Blood in urine (Hematuria): no   Delay in urine (Hesitency): no     Intensity: moderate    Progression of Symptoms:  worsening    Accompanying Signs & Symptoms:  Fever/chills: no   Flank pain YES- left  Nausea and vomiting: no   Any vaginal symptoms: none  Abdominal/Pelvic Pain: YES    History:   History of frequent UTI's: no   History of kidney stones: no   Sexually Active: no   Possibility of pregnancy: No    Precipitating factors:   none    Therapies Tried and outcome: Increase fluid intake  She does have pain towards the end of urination.  States she didn't get any sleep last night due to the discomfort.  Has history of hysterectomy for benign fibroids.. denies vaginal symptoms.       Patient Active Problem List   Diagnosis     CARDIOVASCULAR SCREENING; LDL GOAL LESS THAN 160     Tension headache "     Migraine headache     Adjustment disorder with depressed mood     Seasonal allergic rhinitis, unspecified allergic rhinitis trigger     TBI (traumatic brain injury), without LOC, sequela     Adnexal cyst     Past Surgical History:   Procedure Laterality Date     BIOPSY BREAST Right 6/22/2016    Procedure: BIOPSY BREAST;  Surgeon: Kaiser Roy MD;  Location: [facility]     C TOTAL ABDOM HYSTERECTOMY  06/11/2013    benign fibroids     C TREAT ECTOPIC PREG,RMV TUBE/OVARY  1995     COLONOSCOPY WITH CO2 INSUFFLATION N/A 4/4/2019    Procedure: COLONOSCOPY WITH CO2 INSUFFLATION;  Surgeon: Moise Ingram MD;  Location: [facility]     HYSTERECTOMY, PAP NO LONGER INDICATED       SKIN GRAFT, EACH ADDN 100SQCM  1977    buttocks to left  foot     SURGICAL HISTORY OF -   2004    L wrist tendon       Social History     Tobacco Use     Smoking status: Never Smoker     Smokeless tobacco: Never Used   Substance Use Topics     Alcohol use: No     Alcohol/week: 0.0 standard drinks     Family History   Problem Relation Age of Onset     Diabetes Mother      Diabetes Maternal Grandmother      Hypertension Maternal Grandmother      Arthritis Maternal Grandmother      Cancer Maternal Grandmother         bladder cancer/cervix     Cancer Maternal Grandfather         bone     Asthma Son      Unknown/Adopted Paternal Grandfather      Unknown/Adopted Paternal Grandmother      Cervical Cancer Maternal Aunt          Current Outpatient Medications   Medication Sig Dispense Refill     albuterol (PROAIR HFA/PROVENTIL HFA/VENTOLIN HFA) 108 (90 Base) MCG/ACT inhaler Inhale 2 puffs into the lungs every 4 hours as needed for shortness of breath / dyspnea or wheezing 1 Inhaler 0     docusate sodium (COLACE) 100 MG tablet Take 1 tablet (100 mg) by mouth 2 times daily as needed for constipation 60 tablet 0     fluticasone (FLONASE) 50 MCG/ACT nasal spray Spray 1-2 sprays into both nostrils daily 16 g 0     metoclopramide (REGLAN) 10 MG tablet  Take 1 tablet (10 mg) by mouth 2 times daily as needed (migraine or nausea) 20 tablet 1     Multiple Vitamins-Minerals (HAIR SKIN NAILS) CAPS        NAPROXEN 500 MG TBEC TK 1 T PO BID PRF HEADACHES  3     nitroFURantoin macrocrystal-monohydrate (MACROBID) 100 MG capsule Take 1 capsule (100 mg) by mouth 2 times daily for 3 days 6 capsule 0     Probiotic Product (PROBIOTIC DAILY PO) Take 2 tablets by mouth daily        SUMAtriptan (IMITREX) 100 MG tablet Take 1 tablet (100 mg) by mouth at onset of headache for migraine May repeat in 2 hours if needed: max 2/day 18 tablet 1     Allergies   Allergen Reactions     Vicodin [Hydrocodone-Acetaminophen]      itchy     BP Readings from Last 3 Encounters:   02/28/20 125/75   02/06/20 130/82   11/20/19 136/79    Wt Readings from Last 3 Encounters:   02/28/20 67.6 kg (149 lb)   02/06/20 67.1 kg (148 lb)   11/20/19 67.6 kg (149 lb)                    Reviewed and updated as needed this visit by Provider         Review of Systems   ROS COMP: Constitutional, HEENT, cardiovascular, pulmonary, gi and gu systems are negative, except as otherwise noted.       Objective   Reported vitals:  LMP 06/10/2013    alert and no distress  Psych: Alert and oriented times 3; coherent speech, normal   rate and volume, able to articulate logical thoughts, able   to abstract reason, no tangential thoughts, no hallucinations   or delusions  Her affect is bright     Diagnostic Test Results:  none         Assessment/Plan:  1. Acute cystitis without hematuria  Will treat based on history and symptoms.  If worsens or does not improve, recommend lab visit for UA, wet prep.    - nitroFURantoin macrocrystal-monohydrate (MACROBID) 100 MG capsule; Take 1 capsule (100 mg) by mouth 2 times daily for 3 days  Dispense: 6 capsule; Refill: 0    No follow-ups on file.      Phone call duration:  5:15 minutes    KENNEDI Patel CNP

## 2020-04-06 ENCOUNTER — TELEPHONE (OUTPATIENT)
Dept: FAMILY MEDICINE | Facility: CLINIC | Age: 50
End: 2020-04-06

## 2020-04-06 NOTE — TELEPHONE ENCOUNTER
.Reason for Call:  Thank you    Detailed comments: Patient just wanted to Thank GRACIELA Valdez, the clinic, staff,  for the manner in which her situation was handled; She is grateful she didn't have to come in to the clinic and was treated for her condition via telephone visit;  Patient is feeling much better and again, Patient is very appreciative.     Phone Number Patient can be reached at: Home number on file [phone] (home)    Best Time: no need to call back     Can we leave a detailed message on this number? Not Applicable    Call taken on 4/6/2020 at 8:10 AM by Africa Meng

## 2020-04-10 ENCOUNTER — TELEPHONE (OUTPATIENT)
Dept: FAMILY MEDICINE | Facility: CLINIC | Age: 50
End: 2020-04-10

## 2020-04-10 ENCOUNTER — VIRTUAL VISIT (OUTPATIENT)
Dept: FAMILY MEDICINE | Facility: CLINIC | Age: 50
End: 2020-04-10
Payer: COMMERCIAL

## 2020-04-10 DIAGNOSIS — M79.672 LEFT FOOT PAIN: Primary | ICD-10-CM

## 2020-04-10 PROCEDURE — 99213 OFFICE O/P EST LOW 20 MIN: CPT | Mod: 95 | Performed by: PREVENTIVE MEDICINE
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
## 2020-04-10 NOTE — TELEPHONE ENCOUNTER
"S-(situation): spoke with patient on the phone. She reports having left foot pain and swelling.     B-(background): states that she has been dealing with this \"for awhile\".    A-(assessment): left foot pain, all toes on this foot besides big toe are numb, color is normal for her, per patient report under foot looks \"ok\", she is able to ambulate. She rates her pain 4/10. She states that the foot is swelled . Explained to her what pitting edema is and her description does not match pitting edema.     She is negative for the following symptoms: chest pain, SOB, severe swelling.     R-(recommendations): phone visit today. Assisted her in scheduling phone apt today.    Erika Ford RN    "

## 2020-04-10 NOTE — PATIENT INSTRUCTIONS
At [facility], we strive to deliver an exceptional experience to you, every time we see you. If you receive a survey, please complete it as we do value your feedback.  If you have MyChart, you can expect to receive results automatically within 24 hours of their completion.  Your provider will send a note interpreting your results as well.   If you do not have MyChart, you should receive your results in about a week by mail.    Your care team:                            Family Medicine Internal Medicine   MD Hussain Albright MD Shantel Branch-Fleming, MD Katya Georgiev PA-C Megan Hill, APRBBEE Evans, MD Pediatrics   Octavio Tabares, PABARTOLO Valdez, MD Kim Clarke APRN CNP   MD Tiffani Gordon MD Deborah Mielke, MD Kim Thein, APRN [facility]      Clinic hours: Monday - Thursday 7 am-7 pm; Fridays 7 am-5 pm.   Urgent care: Monday - Friday 11 am-9 pm; Saturday and Sunday 9 am-5 pm.    Clinic: [phone]       [facility]: Monday - Thursday 8 am - 7 pm; Friday 8 am - 6 pm  [facility]: [phone]     Use [email or web address] for 24/7 diagnosis and treatment of dozens of conditions.

## 2020-04-10 NOTE — TELEPHONE ENCOUNTER
Reason for Call: Referral     Detailed comments: Patient is asking if she can get a referral for a vascular specialist  And and ultra sounds of the left foot. Foot in pain and swollen pleas advise patient.    Phone Number Patient can be reached at: Cell number on file:    Telephone Information:   Mobile [phone]       Best Time: Any    Can we leave a detailed message on this number? YES    Call taken on 4/10/2020 at 11:24 AM by Bal Cespedes

## 2020-05-15 ENCOUNTER — TRANSFERRED RECORDS (OUTPATIENT)
Dept: HEALTH INFORMATION MANAGEMENT | Facility: CLINIC | Age: 50
End: 2020-05-15

## 2020-06-12 NOTE — NURSING NOTE
.Do you have any of the following symptoms:  a)      Fever (or reported chills) No  b)      Shortness of Breath No  c)      Cough in the last 14 days No    If a patient reports yes to any of these symptoms, obtain direction from the provider and call the patient back to let them know if they can come in or not.  1.    Provider needs to determine if this patient should still be seen in clinic.  2.    If decision to not see in clinic, call patient back and refer them to COVID- 19 [email or web address] or schedule COVID-19 phone visit.  3.    Turn in-person visit into telephone visit (FOR RETURN PATIENTS ONLY)    Remind patients that visitors are not allowed on site. Only one legal guardian who screens negative to the above questions will be allowed to accompany patients. If a patient indicates that they will be bringing a legal guardian with them to the appointment please make sure to screen both the patient and the legal guardian for symptoms using the tool above.

## 2020-06-15 ENCOUNTER — TELEPHONE (OUTPATIENT)
Dept: PODIATRY | Facility: CLINIC | Age: 50
End: 2020-06-15

## 2020-06-15 ENCOUNTER — OFFICE VISIT (OUTPATIENT)
Dept: PODIATRY | Facility: CLINIC | Age: 50
End: 2020-06-15
Payer: COMMERCIAL

## 2020-06-15 VITALS — HEART RATE: 84 BPM | DIASTOLIC BLOOD PRESSURE: 83 MMHG | SYSTOLIC BLOOD PRESSURE: 125 MMHG | OXYGEN SATURATION: 100 %

## 2020-06-15 DIAGNOSIS — G62.9 PERIPHERAL NEURITIS: Primary | ICD-10-CM

## 2020-06-15 PROCEDURE — 99202 OFFICE O/P NEW SF 15 MIN: CPT | Performed by: PODIATRIST
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
## 2020-06-15 NOTE — PATIENT INSTRUCTIONS
Thanks for coming today.  Ortho/Sports Medicine Clinic  [address] [place] [address]    To schedule future appointments in Ortho Clinic, you may call [phone].    To schedule ordered imaging by your provider:   Call [facility] [phone]    To schedule an injection ordered by your provider:  Call [facility] Injection scheduling line: [phone]  [employer] available online at:  [email or web address]    Please call if any further questions or concerns ([phone]).  Clinic hours 8 am to 5 pm.    Return to clinic (call) if symptoms worsen or fail to improve.

## 2020-06-15 NOTE — TELEPHONE ENCOUNTER
6/15 Provided phone number [phone] to schedule an appointment with Sports medicine.     Brisa Gay   Procedure    Ortho/Sports Med/Ent/Eye   [facility]   [phone]

## 2020-06-22 NOTE — TELEPHONE ENCOUNTER
6/22 2nd attempt  Provided phone number [phone] to schedule an appointment with Sports medicine.     Brisa Gay   Procedure    Ortho/Sports Med/Ent/Eye   [facility]   [phone]

## 2020-06-29 NOTE — TELEPHONE ENCOUNTER
6/29 3rd attempt  Provided phone number [phone] to schedule an appointment with Sports medicine.     Brisa Gay   Procedure    Ortho/Sports Med/Ent/Eye   [facility]   [phone]

## 2020-08-19 ENCOUNTER — ANCILLARY PROCEDURE (OUTPATIENT)
Dept: GENERAL RADIOLOGY | Facility: CLINIC | Age: 50
End: 2020-08-19
Attending: PREVENTIVE MEDICINE
Payer: COMMERCIAL

## 2020-08-19 ENCOUNTER — OFFICE VISIT (OUTPATIENT)
Dept: ORTHOPEDICS | Facility: CLINIC | Age: 50
End: 2020-08-19
Payer: COMMERCIAL

## 2020-08-19 VITALS — SYSTOLIC BLOOD PRESSURE: 113 MMHG | DIASTOLIC BLOOD PRESSURE: 72 MMHG | HEART RATE: 76 BPM

## 2020-08-19 DIAGNOSIS — R20.0 LEG NUMBNESS: ICD-10-CM

## 2020-08-19 DIAGNOSIS — M54.16 LUMBAR RADICULAR PAIN: ICD-10-CM

## 2020-08-19 DIAGNOSIS — R20.0 LEG NUMBNESS: Primary | ICD-10-CM

## 2020-08-19 PROCEDURE — 99214 OFFICE O/P EST MOD 30 MIN: CPT | Performed by: PREVENTIVE MEDICINE

## 2020-08-19 PROCEDURE — 72100 X-RAY EXAM L-S SPINE 2/3 VWS: CPT | Performed by: RADIOLOGY

## 2020-08-19 RX ORDER — METHYLPREDNISOLONE 4 MG
TABLET, DOSE PACK ORAL
Qty: 21 TABLET | Refills: 0 | Status: SHIPPED | OUTPATIENT
Start: 2020-08-19 | End: 2020-09-22 | Stop reason: SINTOL

## 2020-08-19 RX ORDER — GABAPENTIN 100 MG/1
100-200 CAPSULE ORAL AT BEDTIME
Qty: 60 CAPSULE | Refills: 1 | Status: SHIPPED | OUTPATIENT
Start: 2020-08-19 | End: 2023-01-11

## 2020-08-19 ASSESSMENT — PAIN SCALES - GENERAL: PAINLEVEL: MILD PAIN (2)

## 2020-08-19 NOTE — PROGRESS NOTES
HISTORY OF PRESENT ILLNESS  Ms. Rivera is a pleasant 49 year old year old female who presents to clinic today with low back pain that radiates down legs  Lashonda explains that she has had worsened back pain over the past few months  Location: low back  Quality:  achy pain    Severity: 6/10 at worst    Duration: months  Timing: occurs intermittently  Context: occurs while exercising and lifting  Modifying factors:  resting and non-use makes it better, movement and use makes it worse  Associated signs & symptoms: radiation of pain into legs    MEDICAL HISTORY  Patient Active Problem List   Diagnosis     CARDIOVASCULAR SCREENING; LDL GOAL LESS THAN 160     Tension headache     Migraine headache     Adjustment disorder with depressed mood     Seasonal allergic rhinitis, unspecified allergic rhinitis trigger     TBI (traumatic brain injury), without LOC, sequela     Adnexal cyst       Current Outpatient Medications   Medication Sig Dispense Refill     albuterol (PROAIR HFA/PROVENTIL HFA/VENTOLIN HFA) 108 (90 Base) MCG/ACT inhaler Inhale 2 puffs into the lungs every 4 hours as needed for shortness of breath / dyspnea or wheezing 1 Inhaler 0     docusate sodium (COLACE) 100 MG tablet Take 1 tablet (100 mg) by mouth 2 times daily as needed for constipation 60 tablet 0     fluticasone (FLONASE) 50 MCG/ACT nasal spray Spray 1-2 sprays into both nostrils daily 16 g 0     metoclopramide (REGLAN) 10 MG tablet Take 1 tablet (10 mg) by mouth 2 times daily as needed (migraine or nausea) 20 tablet 1     Multiple Vitamins-Minerals (HAIR SKIN NAILS) CAPS        NAPROXEN 500 MG TBEC TK 1 T PO BID PRF HEADACHES  3     Probiotic Product (PROBIOTIC DAILY PO) Take 2 tablets by mouth daily        SUMAtriptan (IMITREX) 100 MG tablet Take 1 tablet (100 mg) by mouth at onset of headache for migraine May repeat in 2 hours if needed: max 2/day 18 tablet 1     tiZANidine (ZANAFLEX) 4 MG tablet          Allergies   Allergen Reactions     Vicodin  [Hydrocodone-Acetaminophen]      itchy       Family History   Problem Relation Age of Onset     Diabetes Mother      Diabetes Maternal Grandmother      Hypertension Maternal Grandmother      Arthritis Maternal Grandmother      Cancer Maternal Grandmother         bladder cancer/cervix     Cancer Maternal Grandfather         bone     Asthma Son      Unknown/Adopted Paternal Grandfather      Unknown/Adopted Paternal Grandmother      Cervical Cancer Maternal Aunt      Social History     Socioeconomic History     Marital status:      Spouse name: no partner     Number of children: 2     Years of education: Not on file     Highest education level: Not on file   Occupational History     Occupation:    Social Needs     Financial resource strain: Not on file     Food insecurity     Worry: Not on file     Inability: Not on file     Transportation needs     Medical: Not on file     Non-medical: Not on file   Tobacco Use     Smoking status: Never Smoker     Smokeless tobacco: Never Used   Substance and Sexual Activity     Alcohol use: No     Alcohol/week: 0.0 standard drinks     Drug use: No     Sexual activity: Yes     Partners: Male     Birth control/protection: Female Surgical   Lifestyle     Physical activity     Days per week: Not on file     Minutes per session: Not on file     Stress: Not on file   Relationships     Social connections     Talks on phone: Not on file     Gets together: Not on file     Attends Restoration service: Not on file     Active member of club or organization: Not on file     Attends meetings of clubs or organizations: Not on file     Relationship status: Not on file     Intimate partner violence     Fear of current or ex partner: Not on file     Emotionally abused: Not on file     Physically abused: Not on file     Forced sexual activity: Not on file   Other Topics Concern     Parent/sibling w/ CABG, MI or angioplasty before 65F 55M? Yes   Social History Narrative     Not on file        Additional medical/Social/Surgical histories reviewed in [facility] and updated as appropriate.     REVIEW OF SYSTEMS (8/19/2020)  10 point ROS of systems including Constitutional, Eyes, Respiratory, Cardiovascular, Gastroenterology, Genitourinary, Integumentary, Musculoskeletal, Psychiatric, Allergic/Immunologic were all negative except for pertinent positives noted in my HPI.     PHYSICAL EXAM  Vitals:    08/19/20 1559   BP: 113/72   Pulse: 76   Vital Signs: /72   Pulse 76   LMP 06/10/2013  Patient declined being weighed. There is no height or weight on file to calculate BMI.    General  - normal appearance, in no obvious distress  HEENT  - conjunctivae not injected, moist mucous membranes, normocephalic/atraumatic head, ears normal appearance, no lesions, mouth normal appearance, no scars, normal dentition and teeth present  CV  - normal peripheral perfusion  Pulm  - normal respiratory pattern, non-labored  Musculoskeletal - lumbar spine  - stance: normal gait without limp, no obvious leg length discrepancy, normal heel and toe walk  - inspection: normal bone and joint alignment, no obvious scoliosis  - palpation: no paravertebral or bony tenderness  - ROM: flexion exacerbates pain, normal extension, sidebending, rotation  - strength: lower extremities 5/5 in all planes  - special tests:  (+) straight leg raise  (+) slump test  Neuro  - patellar and Achilles DTRs 2+ bilaterally, lower extremity sensory deficit throughout L5 distribution, grossly normal coordination, normal muscle tone  Skin  - no ecchymosis, erythema, warmth, or induration, no obvious rash  Psych  - interactive, appropriate, normal mood and affect    ASSESSMENT & PLAN  51 yo female with lumbar ddd, radicular pain  1. Leg numbness    - XR Lumbar Spine 2/3 Views; Future  - MR Lumbar Spine w/o Contrast; Future  - methylPREDNISolone (MEDROL) 4 MG tablet therapy pack; Follow Package Directions  Dispense: 21 tablet; Refill: 0  - gabapentin  (NEURONTIN) 100 MG capsule; Take 1-2 capsules (100-200 mg) by mouth At Bedtime  Dispense: 60 capsule; Refill: 1    2. Lumbar radicular pain  - MR Lumbar Spine w/o Contrast; Future  - methylPREDNISolone (MEDROL) 4 MG tablet therapy pack; Follow Package Directions  Dispense: 21 tablet; Refill: 0  - gabapentin (NEURONTIN) 100 MG capsule; Take 1-2 capsules (100-200 mg) by mouth At Bedtime  Dispense: 60 capsule; Refill: 1      Chris Jones MD, CAQSM

## 2020-08-19 NOTE — PATIENT INSTRUCTIONS
Thanks for coming today.  Ortho[facility] Medicine Clinic  [address] [place] [address]    To schedule future appointments in Ortho Clinic, you may call [phone].    To schedule ordered imaging by your provider:   Call [facility] [phone]    To schedule an injection ordered by your provider:  Call [facility] Injection scheduling line: [phone]  Melon [email or web address] available online at:  [email or web address]    Please call if any further questions or concerns ([phone]).  Clinic hours 8 am to 5 pm.    Return to clinic (call) if symptoms worsen or fail to improve.

## 2020-08-19 NOTE — LETTER
8/19/2020         RE: Lashonda Rivera  [address]  [facility] [address]        Dear Colleague,    Thank you for referring your patient, Lashonda Rivera, to the [facility]. Please see a copy of my visit note below.    HISTORY OF PRESENT ILLNESS  Ms. Rivera is a pleasant 49 year old year old female who presents to clinic today with low back pain that radiates down legs  Lashonda explains that she has had worsened back pain over the past few months  Location: low back  Quality:  achy pain    Severity: 6/10 at worst    Duration: months  Timing: occurs intermittently  Context: occurs while exercising and lifting  Modifying factors:  resting and non-use makes it better, movement and use makes it worse  Associated signs & symptoms: radiation of pain into legs    MEDICAL HISTORY  Patient Active Problem List   Diagnosis     CARDIOVASCULAR SCREENING; LDL GOAL LESS THAN 160     Tension headache     Migraine headache     Adjustment disorder with depressed mood     Seasonal allergic rhinitis, unspecified allergic rhinitis trigger     TBI (traumatic brain injury), without LOC, sequela     Adnexal cyst       Current Outpatient Medications   Medication Sig Dispense Refill     albuterol (PROAIR HFA/PROVENTIL HFA/VENTOLIN HFA) 108 (90 Base) MCG/ACT inhaler Inhale 2 puffs into the lungs every 4 hours as needed for shortness of breath / dyspnea or wheezing 1 Inhaler 0     docusate sodium (COLACE) 100 MG tablet Take 1 tablet (100 mg) by mouth 2 times daily as needed for constipation 60 tablet 0     fluticasone (FLONASE) 50 MCG/ACT nasal spray Spray 1-2 sprays into both nostrils daily 16 g 0     metoclopramide (REGLAN) 10 MG tablet Take 1 tablet (10 mg) by mouth 2 times daily as needed (migraine or nausea) 20 tablet 1     Multiple Vitamins-Minerals (HAIR SKIN NAILS) CAPS        NAPROXEN 500 MG TBEC TK 1 T PO BID PRF HEADACHES  3     Probiotic Product (PROBIOTIC DAILY PO) Take 2 tablets by mouth daily         SUMAtriptan (IMITREX) 100 MG tablet Take 1 tablet (100 mg) by mouth at onset of headache for migraine May repeat in 2 hours if needed: max 2/day 18 tablet 1     tiZANidine (ZANAFLEX) 4 MG tablet          Allergies   Allergen Reactions     Vicodin [Hydrocodone-Acetaminophen]      itchy       Family History   Problem Relation Age of Onset     Diabetes Mother      Diabetes Maternal Grandmother      Hypertension Maternal Grandmother      Arthritis Maternal Grandmother      Cancer Maternal Grandmother         bladder cancer/cervix     Cancer Maternal Grandfather         bone     Asthma Son      Unknown/Adopted Paternal Grandfather      Unknown/Adopted Paternal Grandmother      Cervical Cancer Maternal Aunt      Social History     Socioeconomic History     Marital status:      Spouse name: no partner     Number of children: 2     Years of education: Not on file     Highest education level: Not on file   Occupational History     Occupation:    Social Needs     Financial resource strain: Not on file     Food insecurity     Worry: Not on file     Inability: Not on file     Transportation needs     Medical: Not on file     Non-medical: Not on file   Tobacco Use     Smoking status: Never Smoker     Smokeless tobacco: Never Used   Substance and Sexual Activity     Alcohol use: No     Alcohol/week: 0.0 standard drinks     Drug use: No     Sexual activity: Yes     Partners: Male     Birth control/protection: Female Surgical   Lifestyle     Physical activity     Days per week: Not on file     Minutes per session: Not on file     Stress: Not on file   Relationships     Social connections     Talks on phone: Not on file     Gets together: Not on file     Attends Gnosticism service: Not on file     Active member of club or organization: Not on file     Attends meetings of clubs or organizations: Not on file     Relationship status: Not on file     Intimate partner violence     Fear of current or ex partner:  Not on file     Emotionally abused: Not on file     Physically abused: Not on file     Forced sexual activity: Not on file   Other Topics Concern     Parent/sibling w/ CABG, MI or angioplasty before 65F 55M? Yes   Social History Narrative     Not on file       Additional medical/Social/Surgical histories reviewed in [facility] and updated as appropriate.     REVIEW OF SYSTEMS (8/19/2020)  10 point ROS of systems including Constitutional, Eyes, Respiratory, Cardiovascular, Gastroenterology, Genitourinary, Integumentary, Musculoskeletal, Psychiatric, Allergic/Immunologic were all negative except for pertinent positives noted in my HPI.     PHYSICAL EXAM  Vitals:    08/19/20 1559   BP: 113/72   Pulse: 76   Vital Signs: /72   Pulse 76   LMP 06/10/2013  Patient declined being weighed. There is no height or weight on file to calculate BMI.    General  - normal appearance, in no obvious distress  HEENT  - conjunctivae not injected, moist mucous membranes, normocephalic/atraumatic head, ears normal appearance, no lesions, mouth normal appearance, no scars, normal dentition and teeth present  CV  - normal peripheral perfusion  Pulm  - normal respiratory pattern, non-labored  Musculoskeletal - lumbar spine  - stance: normal gait without limp, no obvious leg length discrepancy, normal heel and toe walk  - inspection: normal bone and joint alignment, no obvious scoliosis  - palpation: no paravertebral or bony tenderness  - ROM: flexion exacerbates pain, normal extension, sidebending, rotation  - strength: lower extremities 5/5 in all planes  - special tests:  (+) straight leg raise  (+) slump test  Neuro  - patellar and Achilles DTRs 2+ bilaterally, lower extremity sensory deficit throughout L5 distribution, grossly normal coordination, normal muscle tone  Skin  - no ecchymosis, erythema, warmth, or induration, no obvious rash  Psych  - interactive, appropriate, normal mood and affect    ASSESSMENT & PLAN  49 yo female with  lumbar ddd, radicular pain  1. Leg numbness    - XR Lumbar Spine 2/3 Views; Future  - MR Lumbar Spine w/o Contrast; Future  - methylPREDNISolone (MEDROL) 4 MG tablet therapy pack; Follow Package Directions  Dispense: 21 tablet; Refill: 0  - gabapentin (NEURONTIN) 100 MG capsule; Take 1-2 capsules (100-200 mg) by mouth At Bedtime  Dispense: 60 capsule; Refill: 1    2. Lumbar radicular pain  - MR Lumbar Spine w/o Contrast; Future  - methylPREDNISolone (MEDROL) 4 MG tablet therapy pack; Follow Package Directions  Dispense: 21 tablet; Refill: 0  - gabapentin (NEURONTIN) 100 MG capsule; Take 1-2 capsules (100-200 mg) by mouth At Bedtime  Dispense: 60 capsule; Refill: 1      Chris Jones MD, [facility]      Again, thank you for allowing me to participate in the care of your patient.        Sincerely,        Chris Jones MD

## 2020-08-24 ENCOUNTER — TELEPHONE (OUTPATIENT)
Dept: FAMILY MEDICINE | Facility: CLINIC | Age: 50
End: 2020-08-24

## 2020-08-24 NOTE — TELEPHONE ENCOUNTER
Patient would like to request a referral to [facility] Derm[facility]. For hair loss all over her head.   Clinic info: [address] [place], [place]   Phone: #[phone]    Routing to provider to review and advise.     Latoya Shay RN  [facility]

## 2020-08-24 NOTE — TELEPHONE ENCOUNTER
Reason for Call:  Referral    Detailed comments: patient would like a call back regarding a referral  To Derm. Dr. Faust     Phone Number Patient can be reached at: Home number on file [phone] (home)    Best Time: any    Can we leave a detailed message on this number? YES    Call taken on 8/24/2020 at 11:37 AM by Blair Lugo

## 2020-08-25 NOTE — TELEPHONE ENCOUNTER
Please schedule a Virtual visit so concern can be addressed and referral placed accordingly.  Thank you,  Asmita Bhatia MD MPH

## 2020-08-26 ENCOUNTER — TELEPHONE (OUTPATIENT)
Dept: ORTHOPEDICS | Facility: CLINIC | Age: 50
End: 2020-08-26

## 2020-08-26 DIAGNOSIS — M54.16 LUMBAR RADICULAR PAIN: Primary | ICD-10-CM

## 2020-08-26 RX ORDER — DICLOFENAC SODIUM 75 MG/1
75 TABLET, DELAYED RELEASE ORAL 2 TIMES DAILY PRN
Qty: 40 TABLET | Refills: 1 | Status: SHIPPED | OUTPATIENT
Start: 2020-08-26 | End: 2021-07-06

## 2020-08-26 NOTE — TELEPHONE ENCOUNTER
This writer attempted to contact Patient on 08/26/20      Reason for call needs visit and left message.      If patient calls back:   Schedule Virtual appointment within 1 week with PCP, postponing message.        Daniela Vera MA

## 2020-08-26 NOTE — TELEPHONE ENCOUNTER
Dr. Jones sent in new medication, Diclofenac which was sent to patients pharmacy. Called patient back to let her know. She currently has Lumbar MRI scheduled and will plan to follow up with Dr. Jones after to review results. All questions answered.

## 2020-08-26 NOTE — TELEPHONE ENCOUNTER
[facility] Call Center    Phone Message    May a detailed message be left on voicemail: yes     Reason for Call: Other: pt would like a new medication, methylPREDNISolone (MEDROL) 4 MG tablet therapy pack [[id number]] (Order [id number] the medication is causing the pt to itch severely. pt would like a different medication. please advise     Action Taken: Message routed to:  Adult Clinics: Sports Medicine p [id number]                                                                             Pharmacist Admission Medication Reconciliation Pending Note    Prior to Admission Medications were reviewed by the pharmacist and pended for provider review during admission medication reconciliation.    Medications were pended by the pharmacist at this time as follows:    Pended Admission Order Reconciliation Actions - Rena Sanchez, [facility] 2/3/2019  8:26 PM     Order Name Action Reordered As    ferrous sulfate 325 (65 FE) MG tablet Order for Admission ferrous sulfate (65 mg Fe per 325 mg) tablet 325 mg    albuterol (PROAIR RESPICLICK) 108 (90 Base) MCG/ACT inhaler Order for Admission albuterol inhaler 2 puff    loratadine (CLARITIN) 10 MG tablet Order for Admission loratadine (CLARITIN) tablet 10 mg    BREO ELLIPTA 200-25 MCG/INH inhaler Order for Admission fluticasone-vilanterol (BREO ELLIPTA) 200-25 MCG/INH inhaler 1 puff    pantoprazole (PROTONIX) 40 MG tablet Order for Admission pantoprazole (PROTONIX) EC tablet 40 mg    tamsulosin (FLOMAX) 0.4 MG Cap Order for Admission tamsulosin (FLOMAX) capsule 0.4 mg    MULTIPLE VITAMINS-MINERALS PO Order for Admission vitamin - therapeutic multivitamins w/minerals 1 tablet    gabapentin (NEURONTIN) 100 MG capsule Order for Admission gabapentin (NEURONTIN) capsule 100 mg    metoPROLOL tartrate (LOPRESSOR) 25 MG tablet Order for Admission metoPROLOL tartrate (LOPRESSOR) tablet 25 mg    pravastatin (PRAVACHOL) 80 MG tablet Order for Admission pravastatin (PRAVACHOL) tablet 80 mg    citalopram (CELEXA) 40 MG tablet Order for Admission citalopram (CeleXA) tablet 40 mg    mirtazapine (REMERON) 15 MG tablet Order for Admission mirtazapine (REMERON) tablet 15 mg    dexamethasone (DECADRON) 4 MG tablet Order for Admission dexamethasone (DECADRON) tablet 4 mg            Orders Pended To Continue For Hospital Stay     ID Description Pended By When Reason    [id number] albuterol inhaler 2 puff-EVERY 4 HOURS RESPIRATORY PRN Rena Noamy [facility] 02/03/19 2026     [id number]  fluticasone-vilanterol (BREO ELLIPTA) 200-25 MCG/INH inhaler 1 puff-DAILY [facility] 02/03/19 2026     [id number] citalopram (CeleXA) tablet 40 mg-DAILY [facility] 02/03/19 2026     [id number] dexamethasone (DECADRON) tablet 4 mg-2 TIMES DAILY WITH MEALS [facility] 02/03/19 2026     [id number] ferrous sulfate (65 mg Fe per 325 mg) tablet 325 mg-2 TIMES DAILY WITH MEALS [facility] 02/03/19 2026     [id number] gabapentin (NEURONTIN) capsule 100 mg-3 TIMES DAILY [facility] 02/03/19 2026     [id number] loratadine (CLARITIN) tablet 10 mg-DAILY [facility] 02/03/19 2026     [id number] metoPROLOL tartrate (LOPRESSOR) tablet 25 mg-EVERY 12 HOURS SCHEDULED [facility] 02/03/19 2026     [id number] mirtazapine (REMERON) tablet 15 mg-NIGHTLY [facility] 02/03/19 2026     [id number] vitamin - therapeutic multivitamins w/minerals 1 tablet-DAILY [facility] 02/03/19 2026     [id number] pantoprazole (PROTONIX) EC tablet 40 mg-DAILY [facility] 02/03/19 2026     [id number] pravastatin (PRAVACHOL) tablet 80 mg-AT BEDTIME [facility] 02/03/19 2026     [id number] tamsulosin (FLOMAX) capsule 0.4 mg-DAILY AFTER A MEAL [facility] 02/03/19 2026             Pharmacist Notations:     Resume aspirin if appropriate (blood in urine)    Last edited by Rena Sanchez [facility] on 02/03/19 at 2026          Orders that are ultimately reconciled and signed during admission medication reconciliation may differ from the pended actions above.    Please contact the pharmacist for questions.    Rena Sanchez [facility]  2/3/2019 8:27 PM

## 2020-08-27 ENCOUNTER — VIRTUAL VISIT (OUTPATIENT)
Dept: FAMILY MEDICINE | Facility: CLINIC | Age: 50
End: 2020-08-27
Payer: COMMERCIAL

## 2020-08-27 ENCOUNTER — TELEPHONE (OUTPATIENT)
Dept: FAMILY MEDICINE | Facility: CLINIC | Age: 50
End: 2020-08-27

## 2020-08-27 DIAGNOSIS — L65.9 HAIR LOSS: Primary | ICD-10-CM

## 2020-08-27 PROCEDURE — 99213 OFFICE O/P EST LOW 20 MIN: CPT | Mod: 95 | Performed by: PREVENTIVE MEDICINE

## 2020-08-27 NOTE — PROGRESS NOTES
"Lashonda Rivera is a 49 year old female who is being evaluated via a billable video visit.      The patient has been notified of following:     \"This video visit will be conducted via a call between you and your physician/provider. We have found that certain health care needs can be provided without the need for an in-person physical exam.  This service lets us provide the care you need with a video conversation.  If a prescription is necessary we can send it directly to your pharmacy.  If lab work is needed we can place an order for that and you can then stop by our lab to have the test done at a later time.    Video visits are billed at different rates depending on your insurance coverage.  Please reach out to your insurance provider with any questions.    If during the course of the call the physician/provider feels a video visit is not appropriate, you will not be charged for this service.\"    Patient has given verbal consent for Video visit? Yes  How would you like to obtain your AVS? MyChart  If you are dropped from the video visit, the video invite should be resent to: [phone]  Will anyone else be joining your video visit? No    Subjective     Lashonda Rivera is a 49 year old female who presents today via video visit for the following health issues:    HPI    Referral:  -patient would like to request a referral to [facility] for hair loss evaluation   started to use a new hair product and that is when hair started falling out  Has been using some Biotin and Nioxin vitamins  Has had significant hair loss since the last few months  Has been wearing a wig  In the start the scalp was itchy and inflamed  No scaling  Patient is almost bald.      Video Start Time: 0825 AM      Review of Systems   Constitutional, HEENT, cardiovascular, pulmonary, gi and gu systems are negative, except as otherwise noted.      Objective           Vitals:  No vitals were obtained today due to virtual " "visit.    Physical Exam     GENERAL: Healthy, alert and no distress  EYES: Eyes grossly normal to inspection.  No discharge or erythema, or obvious scleral/conjunctival abnormalities.  RESP: No audible wheeze, cough, or visible cyanosis.  No visible retractions or increased work of breathing.    SKIN: Visible skin clear. No significant rash, abnormal pigmentation or lesions.  SCALP: almost bald, few patches of hair noted, no scaling, no erythema, no drainage   NEURO: Cranial nerves grossly intact.  Mentation and speech appropriate for age.  PSYCH: Mentation appears normal, affect normal/bright, judgement and insight intact, normal speech and appearance well-groomed.      No results found for this or any previous visit (from the past 24 hour(s)).        Assessment & Plan     Lashonda was seen today for referral.    Diagnoses and all orders for this visit:    Hair loss  -     DERMATOLOGY ADULT REFERRAL; Future  -significant hair loss in the last few months.  -referral to [facility] placed  -may need to fax referral, patient will let us know if needed          BMI:   Estimated body mass index is 26.39 kg/m  as calculated from the following:    Height as of 2/28/20: 1.6 m (5' 3\").    Weight as of 2/28/20: 67.6 kg (149 lb).       Return in about 1 year (around 8/27/2021) for Routine Visit.    Asmita Bhatia MD MPH    [facility]      Video-Visit Details    Type of service:  Video Visit    Video End Time:0835 AM    Originating Location (pt. Location): Home    Distant Location (provider location):  [facility]     Platform used for Video Visit: Sindi"

## 2020-08-27 NOTE — TELEPHONE ENCOUNTER
This writer attempted to contact pt on 08/27/20      Reason for call schedule virtual visit for referral and left message.      If patient calls back:   Schedule virtual appointment within 1 week with primary care, document that pt called and close encounter         Cassie Larsen MA

## 2020-08-27 NOTE — TELEPHONE ENCOUNTER
Reason for Call:  Other Referral    Detailed comments: Lashonda just had a virtual appointment this morning and calling back to provide the fax number for her referral.  Please fax to [facility] fax [phone].      Phone Number Patient can be reached at: Home number on file [phone] (home)    Best Time: Any    Can we leave a detailed message on this number? YES    Call taken on 8/27/2020 at 9:42 AM by Saray Braga

## 2020-08-28 ENCOUNTER — ANCILLARY PROCEDURE (OUTPATIENT)
Dept: MRI IMAGING | Facility: CLINIC | Age: 50
End: 2020-08-28
Attending: PREVENTIVE MEDICINE
Payer: COMMERCIAL

## 2020-08-28 DIAGNOSIS — R20.0 LEG NUMBNESS: ICD-10-CM

## 2020-08-28 DIAGNOSIS — M54.16 LUMBAR RADICULAR PAIN: ICD-10-CM

## 2020-08-28 PROCEDURE — 72148 MRI LUMBAR SPINE W/O DYE: CPT | Performed by: RADIOLOGY

## 2020-09-02 ENCOUNTER — OFFICE VISIT (OUTPATIENT)
Dept: ORTHOPEDICS | Facility: CLINIC | Age: 50
End: 2020-09-02
Payer: COMMERCIAL

## 2020-09-02 VITALS — HEIGHT: 63 IN | BODY MASS INDEX: 26.39 KG/M2

## 2020-09-02 DIAGNOSIS — M51.16 LUMBAR DISC HERNIATION WITH RADICULOPATHY: Primary | ICD-10-CM

## 2020-09-02 PROCEDURE — 99214 OFFICE O/P EST MOD 30 MIN: CPT | Performed by: PREVENTIVE MEDICINE

## 2020-09-02 NOTE — PROGRESS NOTES
HISTORY OF PRESENT ILLNESS  Ms. Rivera is a pleasant 49 year old year old female who presents to clinic today with ongoing low back pain  Lashonda explains that she continues to have low back pain that radiates into her legs  The medications help somewhat, but still has pain  Wants to discuss lumbar MRI  Location: low back  Quality:  achy pain    Severity: 7/10 at worst    Duration: 6+ months  Timing: occurs intermittently  Context: occurs while exercising and lifting and sitting and standing  Modifying factors:  resting and non-use makes it better, movement and use makes it worse  Associated signs & symptoms: radiation into legs    MEDICAL HISTORY  Patient Active Problem List   Diagnosis     CARDIOVASCULAR SCREENING; LDL GOAL LESS THAN 160     Tension headache     Migraine headache     Adjustment disorder with depressed mood     Seasonal allergic rhinitis, unspecified allergic rhinitis trigger     TBI (traumatic brain injury), without LOC, sequela     Adnexal cyst       Current Outpatient Medications   Medication Sig Dispense Refill     albuterol (PROAIR HFA/PROVENTIL HFA/VENTOLIN HFA) 108 (90 Base) MCG/ACT inhaler Inhale 2 puffs into the lungs every 4 hours as needed for shortness of breath / dyspnea or wheezing 1 Inhaler 0     diclofenac (VOLTAREN) 75 MG EC tablet Take 1 tablet (75 mg) by mouth 2 times daily as needed 40 tablet 1     docusate sodium (COLACE) 100 MG tablet Take 1 tablet (100 mg) by mouth 2 times daily as needed for constipation 60 tablet 0     fluticasone (FLONASE) 50 MCG/ACT nasal spray Spray 1-2 sprays into both nostrils daily 16 g 0     gabapentin (NEURONTIN) 100 MG capsule Take 1-2 capsules (100-200 mg) by mouth At Bedtime 60 capsule 1     methylPREDNISolone (MEDROL) 4 MG tablet therapy pack Follow Package Directions 21 tablet 0     metoclopramide (REGLAN) 10 MG tablet Take 1 tablet (10 mg) by mouth 2 times daily as needed (migraine or nausea) 20 tablet 1     Multiple Vitamins-Minerals (HAIR  SKIN NAILS) CAPS        NAPROXEN 500 MG TBEC TK 1 T PO BID PRF HEADACHES  3     Probiotic Product (PROBIOTIC DAILY PO) Take 2 tablets by mouth daily        SUMAtriptan (IMITREX) 100 MG tablet Take 1 tablet (100 mg) by mouth at onset of headache for migraine May repeat in 2 hours if needed: max 2/day 18 tablet 1     tiZANidine (ZANAFLEX) 4 MG tablet          Allergies   Allergen Reactions     Medrol [Methylprednisolone] Itching     Vicodin [Hydrocodone-Acetaminophen]      itchy       Family History   Problem Relation Age of Onset     Diabetes Mother      Diabetes Maternal Grandmother      Hypertension Maternal Grandmother      Arthritis Maternal Grandmother      Cancer Maternal Grandmother         bladder cancer/cervix     Cancer Maternal Grandfather         bone     Asthma Son      Unknown/Adopted Paternal Grandfather      Unknown/Adopted Paternal Grandmother      Cervical Cancer Maternal Aunt      Social History     Socioeconomic History     Marital status:      Spouse name: no partner     Number of children: 2     Years of education: Not on file     Highest education level: Not on file   Occupational History     Occupation:    Social Needs     Financial resource strain: Not on file     Food insecurity     Worry: Not on file     Inability: Not on file     Transportation needs     Medical: Not on file     Non-medical: Not on file   Tobacco Use     Smoking status: Never Smoker     Smokeless tobacco: Never Used   Substance and Sexual Activity     Alcohol use: No     Alcohol/week: 0.0 standard drinks     Drug use: No     Sexual activity: Yes     Partners: Male     Birth control/protection: Female Surgical   Lifestyle     Physical activity     Days per week: Not on file     Minutes per session: Not on file     Stress: Not on file   Relationships     Social connections     Talks on phone: Not on file     Gets together: Not on file     Attends Sabianism service: Not on file     Active member of club or  "organization: Not on file     Attends meetings of clubs or organizations: Not on file     Relationship status: Not on file     Intimate partner violence     Fear of current or ex partner: Not on file     Emotionally abused: Not on file     Physically abused: Not on file     Forced sexual activity: Not on file   Other Topics Concern     Parent/sibling w/ CABG, MI or angioplasty before 65F 55M? Yes   Social History Narrative     Not on file       Additional medical/Social/Surgical histories reviewed in [facility] and updated as appropriate.     REVIEW OF SYSTEMS (9/2/2020)  10 point ROS of systems including Constitutional, Eyes, Respiratory, Cardiovascular, Gastroenterology, Genitourinary, Integumentary, Musculoskeletal, Psychiatric, Allergic/Immunologic were all negative except for pertinent positives noted in my HPI.     PHYSICAL EXAM  Vitals:    09/02/20 1559   Height: 1.6 m (5' 3\")     Vital Signs: Ht 1.6 m (5' 3\")   LMP 06/10/2013   BMI 26.39 kg/m   Patient declined being weighed. Body mass index is 26.39 kg/m .    General  - normal appearance, in no obvious distress  HEENT  - conjunctivae not injected, moist mucous membranes, normocephalic/atraumatic head, ears normal appearance, no lesions, mouth normal appearance, no scars, normal dentition and teeth present  CV  - normal peripheral perfusion  Pulm  - normal respiratory pattern, non-labored  Musculoskeletal - lumbar spine  - stance: normal gait without limp, no obvious leg length discrepancy, normal heel and toe walk  - inspection: normal bone and joint alignment, no obvious scoliosis  - palpation: no paravertebral or bony tenderness  - ROM: flexion exacerbates pain, normal extension, sidebending, rotation  - strength: lower extremities 5/5 in all planes  - special tests:  (+) straight leg raise- right  (+) slump test  Neuro  - patellar and Achilles DTRs 2+ bilaterally, some lower extremity sensory deficit throughout L5 distribution, grossly normal coordination, " normal muscle tone  Skin  - no ecchymosis, erythema, warmth, or induration, no obvious rash  Psych  - interactive, appropriate, normal mood and affect  ASSESSMENT & PLAN  49 yo female with lumbar disc herniation, lumbar radicular pain  Reviewed lumbar MRI: shows ddd, disc herniations  Ordered SHAHRIAR after discussion  Cont. HEP  F/u in 1 month      Chris Jones MD, CAQSM

## 2020-09-02 NOTE — LETTER
9/2/2020         RE: Lashonda Rivera  [address]  [place] [facility] [address]        Dear Colleague,    Thank you for referring your patient, L[facility]onda Rivera, to the [facility]. Please see a copy of my visit note below.    HISTORY OF PRESENT ILLNESS  Ms. Rivera is a pleasant 49 year old year old female who presents to clinic today with ongoing low back pain  Lashonda explains that she continues to have low back pain that radiates into her legs  The medications help somewhat, but still has pain  Wants to discuss lumbar MRI  Location: low back  Quality:  achy pain    Severity: 7/10 at worst    Duration: 6+ months  Timing: occurs intermittently  Context: occurs while exercising and lifting and sitting and standing  Modifying factors:  resting and non-use makes it better, movement and use makes it worse  Associated signs & symptoms: radiation into legs    MEDICAL HISTORY  Patient Active Problem List   Diagnosis     CARDIOVASCULAR SCREENING; LDL GOAL LESS THAN 160     Tension headache     Migraine headache     Adjustment disorder with depressed mood     Seasonal allergic rhinitis, unspecified allergic rhinitis trigger     TBI (traumatic brain injury), without LOC, sequela     Adnexal cyst       Current Outpatient Medications   Medication Sig Dispense Refill     albuterol (PROAIR HFA/PROVENTIL HFA/VENTOLIN HFA) 108 (90 Base) MCG/ACT inhaler Inhale 2 puffs into the lungs every 4 hours as needed for shortness of breath / dyspnea or wheezing 1 Inhaler 0     diclofenac (VOLTAREN) 75 MG EC tablet Take 1 tablet (75 mg) by mouth 2 times daily as needed 40 tablet 1     docusate sodium (COLACE) 100 MG tablet Take 1 tablet (100 mg) by mouth 2 times daily as needed for constipation 60 tablet 0     fluticasone (FLONASE) 50 MCG/ACT nasal spray Spray 1-2 sprays into both nostrils daily 16 g 0     gabapentin (NEURONTIN) 100 MG capsule Take 1-2 capsules (100-200 mg) by mouth At Bedtime 60 capsule 1      methylPREDNISolone (MEDROL) 4 MG tablet therapy pack Follow Package Directions 21 tablet 0     metoclopramide (REGLAN) 10 MG tablet Take 1 tablet (10 mg) by mouth 2 times daily as needed (migraine or nausea) 20 tablet 1     Multiple Vitamins-Minerals (HAIR SKIN NAILS) CAPS        NAPROXEN 500 MG TBEC TK 1 T PO BID PRF HEADACHES  3     Probiotic Product (PROBIOTIC DAILY PO) Take 2 tablets by mouth daily        SUMAtriptan (IMITREX) 100 MG tablet Take 1 tablet (100 mg) by mouth at onset of headache for migraine May repeat in 2 hours if needed: max 2/day 18 tablet 1     tiZANidine (ZANAFLEX) 4 MG tablet          Allergies   Allergen Reactions     Medrol [Methylprednisolone] Itching     Vicodin [Hydrocodone-Acetaminophen]      itchy       Family History   Problem Relation Age of Onset     Diabetes Mother      Diabetes Maternal Grandmother      Hypertension Maternal Grandmother      Arthritis Maternal Grandmother      Cancer Maternal Grandmother         bladder cancer/cervix     Cancer Maternal Grandfather         bone     Asthma Son      Unknown/Adopted Paternal Grandfather      Unknown/Adopted Paternal Grandmother      Cervical Cancer Maternal Aunt      Social History     Socioeconomic History     Marital status:      Spouse name: no partner     Number of children: 2     Years of education: Not on file     Highest education level: Not on file   Occupational History     Occupation:    Social Needs     Financial resource strain: Not on file     Food insecurity     Worry: Not on file     Inability: Not on file     Transportation needs     Medical: Not on file     Non-medical: Not on file   Tobacco Use     Smoking status: Never Smoker     Smokeless tobacco: Never Used   Substance and Sexual Activity     Alcohol use: No     Alcohol/week: 0.0 standard drinks     Drug use: No     Sexual activity: Yes     Partners: Male     Birth control/protection: Female Surgical   Lifestyle     Physical activity     Days  "per week: Not on file     Minutes per session: Not on file     Stress: Not on file   Relationships     Social connections     Talks on phone: Not on file     Gets together: Not on file     Attends Islam service: Not on file     Active member of club or organization: Not on file     Attends meetings of clubs or organizations: Not on file     Relationship status: Not on file     Intimate partner violence     Fear of current or ex partner: Not on file     Emotionally abused: Not on file     Physically abused: Not on file     Forced sexual activity: Not on file   Other Topics Concern     Parent/sibling w/ CABG, MI or angioplasty before 65F 55M? Yes   Social History Narrative     Not on file       Additional medical/Social/Surgical histories reviewed in [facility] and updated as appropriate.     REVIEW OF SYSTEMS (9/2/2020)  10 point ROS of systems including Constitutional, Eyes, Respiratory, Cardiovascular, Gastroenterology, Genitourinary, Integumentary, Musculoskeletal, Psychiatric, Allergic/Immunologic were all negative except for pertinent positives noted in my HPI.     PHYSICAL EXAM  Vitals:    09/02/20 1559   Height: 1.6 m (5' 3\")     Vital Signs: Ht 1.6 m (5' 3\")   LMP 06/10/2013   BMI 26.39 kg/m   Patient declined being weighed. Body mass index is 26.39 kg/m .    General  - normal appearance, in no obvious distress  HEENT  - conjunctivae not injected, moist mucous membranes, normocephalic/atraumatic head, ears normal appearance, no lesions, mouth normal appearance, no scars, normal dentition and teeth present  CV  - normal peripheral perfusion  Pulm  - normal respiratory pattern, non-labored  Musculoskeletal - lumbar spine  - stance: normal gait without limp, no obvious leg length discrepancy, normal heel and toe walk  - inspection: normal bone and joint alignment, no obvious scoliosis  - palpation: no paravertebral or bony tenderness  - ROM: flexion exacerbates pain, normal extension, sidebending, rotation  - " strength: lower extremities 5/5 in all planes  - special tests:  (+) straight leg raise- right  (+) slump test  Neuro  - patellar and Achilles DTRs 2+ bilaterally, some lower extremity sensory deficit throughout L5 distribution, grossly normal coordination, normal muscle tone  Skin  - no ecchymosis, erythema, warmth, or induration, no obvious rash  Psych  - interactive, appropriate, normal mood and affect  ASSESSMENT & PLAN  51 yo female with lumbar disc herniation, lumbar radicular pain  Reviewed lumbar MRI: shows ddd, disc herniations  Ordered SHAHRIAR after discussion  Cont. HEP  F/u in 1 month      Chris Jones MD, CAQSM      Again, thank you for allowing me to participate in the care of your patient.        Sincerely,        Chris Jones MD

## 2020-09-14 ENCOUNTER — TELEPHONE (OUTPATIENT)
Dept: FAMILY MEDICINE | Facility: CLINIC | Age: 50
End: 2020-09-14

## 2020-09-14 NOTE — TELEPHONE ENCOUNTER
Reason for Call: Request for an order or referral:    Order or referral being requested: Dermatology    Date needed: as soon as possible    Has the patient been seen by the PCP for this problem? YES    Additional comments: Patient was given a Dermatology referral.  Patient calling in with fax number.  Needs referral faxed to [phone] [facility].    Phone number Patient can be reached at:  Cell number on file:    Telephone Information:   Mobile [phone]       Best Time:  Any    Can we leave a detailed message on this number?  YES    Call taken on 9/14/2020 at 4:11 PM by Benigno Connors

## 2020-09-15 DIAGNOSIS — Z11.59 ENCOUNTER FOR SCREENING FOR OTHER VIRAL DISEASES: Primary | ICD-10-CM

## 2020-09-15 NOTE — TELEPHONE ENCOUNTER
Dermatology referral RE faxed to [phone] [facility].    BRENT Sage.

## 2020-09-22 ENCOUNTER — OFFICE VISIT (OUTPATIENT)
Dept: FAMILY MEDICINE | Facility: CLINIC | Age: 50
End: 2020-09-22
Payer: COMMERCIAL

## 2020-09-22 VITALS
RESPIRATION RATE: 16 BRPM | DIASTOLIC BLOOD PRESSURE: 78 MMHG | HEIGHT: 63 IN | OXYGEN SATURATION: 100 % | TEMPERATURE: 98.1 F | BODY MASS INDEX: 25.69 KG/M2 | WEIGHT: 145 LBS | HEART RATE: 79 BPM | SYSTOLIC BLOOD PRESSURE: 114 MMHG

## 2020-09-22 DIAGNOSIS — Z00.00 ROUTINE GENERAL MEDICAL EXAMINATION AT A HEALTH CARE FACILITY: Primary | ICD-10-CM

## 2020-09-22 PROCEDURE — 90686 IIV4 VACC NO PRSV 0.5 ML IM: CPT | Performed by: PREVENTIVE MEDICINE

## 2020-09-22 PROCEDURE — 90471 IMMUNIZATION ADMIN: CPT | Performed by: PREVENTIVE MEDICINE

## 2020-09-22 PROCEDURE — 99396 PREV VISIT EST AGE 40-64: CPT | Mod: 25 | Performed by: PREVENTIVE MEDICINE

## 2020-09-22 ASSESSMENT — MIFFLIN-ST. JEOR: SCORE: 1252.88

## 2020-09-22 ASSESSMENT — PAIN SCALES - GENERAL: PAINLEVEL: NO PAIN (0)

## 2020-09-22 NOTE — PROGRESS NOTES
SUBJECTIVE:   CC: Lashonda Rivera is an 50 year old woman who presents for preventive health visit.     Patient has been advised of split billing requirements and indicates understanding: Yes  Healthy Habits:    Do you get at least three servings of calcium containing foods daily (dairy, green leafy vegetables, etc.)? yes    Amount of exercise or daily activities, outside of work: 7 day(s) per week, started bike riding     Problems taking medications regularly No    Medication side effects: Yes gabapentin    Have you had an eye exam in the past two years? yes    Do you see a dentist twice per year? yes    Do you have sleep apnea, excessive snoring or daytime drowsiness?no      Thick phlegm at night  Some post nasal drainage   We discussed using Flonase nasal spray       Today's PHQ-2 Score:   PHQ-2 ( 1999 [employer]) 9/22/2020 11/20/2019   Q1: Little interest or pleasure in doing things 0 0   Q2: Feeling down, depressed or hopeless 0 0   PHQ-2 Score 0 0       Abuse: Current or Past(Physical, Sexual or Emotional)- No  Do you feel safe in your environment? Yes        Social History     Tobacco Use     Smoking status: Never Smoker     Smokeless tobacco: Never Used   Substance Use Topics     Alcohol use: No     Alcohol/week: 0.0 standard drinks     If you drink alcohol do you typically have >3 drinks per day or >7 drinks per week? No                     Reviewed orders with patient.  Reviewed health maintenance and updated orders accordingly - Yes  Lab work is in process  Labs reviewed in EPIC  BP Readings from Last 3 Encounters:   09/22/20 114/78   08/19/20 113/72   06/15/20 125/83    Wt Readings from Last 3 Encounters:   09/22/20 65.8 kg (145 lb)   02/28/20 67.6 kg (149 lb)   02/06/20 67.1 kg (148 lb)                  Patient Active Problem List   Diagnosis     CARDIOVASCULAR SCREENING; LDL GOAL LESS THAN 160     Tension headache     Migraine headache     Adjustment disorder with depressed mood     Seasonal allergic  rhinitis, unspecified allergic rhinitis trigger     TBI (traumatic brain injury), without LOC, sequela     Adnexal cyst     Past Surgical History:   Procedure Laterality Date     BIOPSY BREAST Right 6/22/2016    Procedure: BIOPSY BREAST;  Surgeon: Kaiser Roy MD;  Location: [facility]     C TOTAL ABDOM HYSTERECTOMY  06/11/2013    benign fibroids     C TREAT ECTOPIC PREG,RMV TUBE/OVARY  1995     COLONOSCOPY WITH CO2 INSUFFLATION N/A 4/4/2019    Procedure: COLONOSCOPY WITH CO2 INSUFFLATION;  Surgeon: Moise Ingram MD;  Location: [facility]     HYSTERECTOMY, PAP NO LONGER INDICATED       SKIN GRAFT, EACH ADDN 100SQCM  1977    buttocks to left  foot     SURGICAL HISTORY OF -   2004    L wrist tendon       Social History     Tobacco Use     Smoking status: Never Smoker     Smokeless tobacco: Never Used   Substance Use Topics     Alcohol use: No     Alcohol/week: 0.0 standard drinks     Family History   Problem Relation Age of Onset     Diabetes Mother      Diabetes Maternal Grandmother      Hypertension Maternal Grandmother      Arthritis Maternal Grandmother      Cancer Maternal Grandmother         bladder cancer/cervix     Cancer Maternal Grandfather         bone     Asthma Son      Unknown/Adopted Paternal Grandfather      Unknown/Adopted Paternal Grandmother      Cervical Cancer Maternal Aunt          Current Outpatient Medications   Medication Sig Dispense Refill     albuterol (PROAIR HFA/PROVENTIL HFA/VENTOLIN HFA) 108 (90 Base) MCG/ACT inhaler Inhale 2 puffs into the lungs every 4 hours as needed for shortness of breath / dyspnea or wheezing 1 Inhaler 0     fluticasone (FLONASE) 50 MCG/ACT nasal spray Spray 1-2 sprays into both nostrils daily 16 g 0     gabapentin (NEURONTIN) 100 MG capsule Take 1-2 capsules (100-200 mg) by mouth At Bedtime 60 capsule 1     metoclopramide (REGLAN) 10 MG tablet Take 1 tablet (10 mg) by mouth 2 times daily as needed (migraine or nausea) 20 tablet 1     Multiple  Vitamins-Minerals (HAIR SKIN NAILS) CAPS        NAPROXEN 500 MG TBEC TK 1 T PO BID PRF HEADACHES  3     Probiotic Product (PROBIOTIC DAILY PO) Take 2 tablets by mouth daily        SUMAtriptan (IMITREX) 100 MG tablet Take 1 tablet (100 mg) by mouth at onset of headache for migraine May repeat in 2 hours if needed: max 2/day 18 tablet 1     tiZANidine (ZANAFLEX) 4 MG tablet        diclofenac (VOLTAREN) 75 MG EC tablet Take 1 tablet (75 mg) by mouth 2 times daily as needed (Patient not taking: Reported on 9/22/2020) 40 tablet 1     Allergies   Allergen Reactions     Medrol [Methylprednisolone] Itching     Vicodin [Hydrocodone-Acetaminophen]      itchy       Mammogram Screening: Patient under age 50, mutual decision reflected in health maintenance.      Pertinent mammograms are reviewed under the imaging tab.  History of abnormal Pap smear: Status post benign hysterectomy. Health Maintenance and Surgical History updated.  PAP / HPV 9/4/2012 10/16/2009   PAP NIL NIL     Reviewed and updated as needed this visit by clinical staff  Tobacco  Allergies  Meds  Problems  Med Hx  Surg Hx  Fam Hx  Soc Hx          Reviewed and updated as needed this visit by Provider  Tobacco  Allergies  Meds  Problems  Med Hx  Surg Hx  Fam Hx        Past Medical History:   Diagnosis Date     Adjustment disorder with depressed mood 1/19/2016     Breast fibroadenoma, right 2016    excised     Complication of anesthesia     nausea/ use scop. patches     H/O: hysterectomy 2013     Hypothyroidism 2005    Now resolved     Leiomyoma of uterus, unspecified 2013    resolved     Menorrhagia 2000    resolved     Migraine headache 10/2/2012     Recurrent UTI 2012     TBI (traumatic brain injury), without LOC, sequela 9/12/2017     Vitamin B 12 deficiency 2005    Resolved      Past Surgical History:   Procedure Laterality Date     BIOPSY BREAST Right 6/22/2016    Procedure: BIOPSY BREAST;  Surgeon: Kaiser Roy MD;  [facility]      "C TOTAL ABDOM HYSTERECTOMY  06/11/2013    benign fibroids     C TREAT ECTOPIC PREG,RMV TUBE/OVARY  1995     COLONOSCOPY WITH CO2 INSUFFLATION N/A 4/4/2019    Procedure: COLONOSCOPY WITH CO2 INSUFFLATION;  Surgeon: Moise Ingram MD;  Location: [facility]     HYSTERECTOMY, PAP NO LONGER INDICATED       SKIN GRAFT, EACH ADDN 100SQCM  1977    buttocks to left  foot     SURGICAL HISTORY OF -   2004    L wrist tendon       ROS:  CONSTITUTIONAL: NEGATIVE for fever, chills, change in weight  INTEGUMENTARU/SKIN: NEGATIVE for worrisome rashes, moles or lesions  EYES: NEGATIVE for vision changes or irritation  RESP: NEGATIVE for significant cough or SOB  BREAST: NEGATIVE for masses, tenderness or discharge  CV: NEGATIVE for chest pain, palpitations or peripheral edema  GI: NEGATIVE for nausea, abdominal pain, heartburn, or change in bowel habits  MUSCULOSKELETAL: NEGATIVE for significant arthralgias or myalgia  NEURO: NEGATIVE for weakness, dizziness or paresthesias  ENDOCRINE: NEGATIVE for temperature intolerance, skin/hair changes  HEME/ALLERGY/IMMUNE: NEGATIVE for bleeding problems  PSYCHIATRIC: NEGATIVE for changes in mood or affect    OBJECTIVE:   /78 (BP Location: Left arm, Patient Position: Chair, Cuff Size: Adult Regular)   Pulse 79   Temp 98.1  F (36.7  C) (Oral)   Resp 16   Ht 1.594 m (5' 2.75\")   Wt 65.8 kg (145 lb)   LMP 06/10/2013 (Exact Date)   SpO2 100%   Breastfeeding No   BMI 25.89 kg/m    EXAM:  GENERAL: healthy, alert and no distress  EYES: Eyes grossly normal to inspection, PERRL and conjunctivae and sclerae normal  NECK: no adenopathy, no asymmetry  RESP: lungs clear to auscultation - no rales, rhonchi or wheezes  BREAST: normal without masses, tenderness or nipple discharge and no palpable axillary masses or adenopathy  CV: regular rate and rhythm, normal S1 S2, no S3 or S4, no murmur, click or rub, no peripheral edema and peripheral pulses strong  ABDOMEN: soft, nontender, no rebound " "or guarding   MS: no gross musculoskeletal defects noted, no edema  SKIN: no suspicious lesions or rashes  NEURO: Normal strength and tone, mentation intact and speech normal  PSYCH: mentation appears normal, affect normal/bright    Diagnostic Test Results:  Labs reviewed in Epic  No results found for this or any previous visit (from the past 24 hour(s)).    ASSESSMENT/PLAN:   Lashonda was seen today for physical.    Diagnoses and all orders for this visit:    Routine general medical examination at a health care facility  -     Lipid panel reflex to direct LDL Non-fasting; Future  -     Glucose; Future  -will come in for future labs, not due till 11/2020  -preventive guidelines reviewed, up to date for the patient     Other orders  -     INFLUENZA VACCINE IM > 6 MONTHS VALENT IIV4 [31398]  -     ADMIN 1st VACCINE        Patient has been advised of split billing requirements and indicates understanding: Yes  COUNSELING:   Reviewed preventive health counseling, as reflected in patient instructions       Regular exercise       Healthy diet/nutrition       Immunizations    Vaccinated for: Influenza          Estimated body mass index is 25.89 kg/m  as calculated from the following:    Height as of this encounter: 1.594 m (5' 2.75\").    Weight as of this encounter: 65.8 kg (145 lb).        She reports that she has never smoked. She has never used smokeless tobacco.      Counseling Resources:  ATP IV Guidelines  Pooled Cohorts Equation Calculator  Breast Cancer Risk Calculator  BRCA-Related Cancer Risk Assessment: FHS-7 Tool  FRAX Risk Assessment  ICSI Preventive Guidelines  Dietary Guidelines for Americans, 2010  USDA's MyPlate  ASA Prophylaxis  Lung CA Screening    Asmita Bhatia MD MPH    [facility]  "

## 2020-09-22 NOTE — PROGRESS NOTES
Prior to immunization administration, verified patients identity using patient s name and date of birth. Please see Immunization Activity for additional information.     Screening Questionnaire for Adult Immunization    Are you sick today?   No   Do you have allergies to medications, food, a vaccine component or latex?   No   Have you ever had a serious reaction after receiving a vaccination?   No   Do you have a long-term health problem with heart, lung, kidney, or metabolic disease (e.g., diabetes), asthma, a blood disorder, no spleen, complement component deficiency, a cochlear implant, or a spinal fluid leak?  Are you on long-term aspirin therapy?   No   Do you have cancer, leukemia, HIV/AIDS, or any other immune system problem?   No   Do you have a parent, brother, or sister with an immune system problem?   No   In the past 3 months, have you taken medications that affect  your immune system, such as prednisone, other steroids, or anticancer drugs; drugs for the treatment of rheumatoid arthritis, Crohn s disease, or psoriasis; or have you had radiation treatments?   No   Have you had a seizure, or a brain or other nervous system problem?   No   During the past year, have you received a transfusion of blood or blood    products, or been given immune (gamma) globulin or antiviral drug?   No   For women: Are you pregnant or is there a chance you could become       pregnant during the next month?   No   Have you received any vaccinations in the past 4 weeks?   No     Immunization questionnaire answers were all negative.        Per orders of Dr. Bhatia, injection of Influenza given by Nancy Porras MA. Patient instructed to remain in clinic for 15 minutes afterwards, and to report any adverse reaction to me immediately.       Screening performed by Nancy Porras MA on 9/22/2020 at 1:40 PM.

## 2020-10-12 DIAGNOSIS — Z11.59 ENCOUNTER FOR SCREENING FOR OTHER VIRAL DISEASES: ICD-10-CM

## 2020-10-12 PROCEDURE — U0003 INFECTIOUS AGENT DETECTION BY NUCLEIC ACID (DNA OR RNA); SEVERE ACUTE RESPIRATORY SYNDROME CORONAVIRUS 2 (SARS-COV-2) (CORONAVIRUS DISEASE [COVID-19]), AMPLIFIED PROBE TECHNIQUE, MAKING USE OF HIGH THROUGHPUT TECHNOLOGIES AS DESCRIBED BY CMS-2020-01-R: HCPCS | Performed by: PREVENTIVE MEDICINE

## 2020-10-13 LAB
SARS-COV-2 RNA SPEC QL NAA+PROBE: NOT DETECTED
SPECIMEN SOURCE: NORMAL

## 2020-10-15 ENCOUNTER — TELEPHONE (OUTPATIENT)
Dept: ORTHOPEDICS | Facility: CLINIC | Age: 50
End: 2020-10-15

## 2020-10-15 NOTE — TELEPHONE ENCOUNTER
Called and let her know her Covid test is negative and that she is OK to still have her SHAHRIAR.

## 2020-10-15 NOTE — TELEPHONE ENCOUNTER
[facility] Call Center    Phone Message    May a detailed message be left on voicemail: yes     Reason for Call: Requesting Results   Name/type of test: Pre procedure Covid Teeas  Date of test: 10/12/2020  The patient is scheduled for an epidural.  She is requesting a call to confirm that she is ok to come for the procedure.  Please advise.       Action Taken: Message routed to:  Adult Clinics: Sports Medicine p [phone]    Travel Screening: Not Applicable

## 2020-10-16 ENCOUNTER — ANCILLARY PROCEDURE (OUTPATIENT)
Dept: GENERAL RADIOLOGY | Facility: CLINIC | Age: 50
End: 2020-10-16
Attending: PREVENTIVE MEDICINE
Payer: COMMERCIAL

## 2020-10-16 VITALS — OXYGEN SATURATION: 99 % | SYSTOLIC BLOOD PRESSURE: 124 MMHG | DIASTOLIC BLOOD PRESSURE: 84 MMHG | HEART RATE: 92 BPM

## 2020-10-16 DIAGNOSIS — M51.16 LUMBAR DISC HERNIATION WITH RADICULOPATHY: ICD-10-CM

## 2020-10-16 PROCEDURE — 62323 NJX INTERLAMINAR LMBR/SAC: CPT

## 2020-10-16 PROCEDURE — 96372 THER/PROPH/DIAG INJ SC/IM: CPT | Mod: 59 | Performed by: PREVENTIVE MEDICINE

## 2020-10-16 RX ORDER — METHYLPREDNISOLONE ACETATE 80 MG/ML
80 INJECTION, SUSPENSION INTRA-ARTICULAR; INTRALESIONAL; INTRAMUSCULAR; SOFT TISSUE ONCE
Status: COMPLETED | OUTPATIENT
Start: 2020-10-16 | End: 2020-10-16

## 2020-10-16 RX ORDER — BUPIVACAINE HYDROCHLORIDE 5 MG/ML
2 INJECTION, SOLUTION EPIDURAL; INTRACAUDAL ONCE
Status: COMPLETED | OUTPATIENT
Start: 2020-10-16 | End: 2020-10-16

## 2020-10-16 RX ORDER — IOPAMIDOL 408 MG/ML
10 INJECTION, SOLUTION INTRATHECAL ONCE
Status: COMPLETED | OUTPATIENT
Start: 2020-10-16 | End: 2020-10-16

## 2020-10-16 RX ADMIN — IOPAMIDOL 2 ML: 408 INJECTION, SOLUTION INTRATHECAL at 13:20

## 2020-10-16 RX ADMIN — METHYLPREDNISOLONE ACETATE 80 MG: 80 INJECTION, SUSPENSION INTRA-ARTICULAR; INTRALESIONAL; INTRAMUSCULAR; SOFT TISSUE at 13:20

## 2020-10-16 RX ADMIN — BUPIVACAINE HYDROCHLORIDE 20 MG: 5 INJECTION, SOLUTION EPIDURAL; INTRACAUDAL at 13:20

## 2020-10-16 NOTE — PROGRESS NOTES
AFTER YOU GO HOME    ? DO relax; minimize your activity for 24 hours  ? You may resume normal activity tomorrow  ? You may remove the bandage in the evening or next morning  ? You may resume bathing the next day  ? Drink at least 4 extra glasses of fluid today if not on fluid restrictions  ? DO NOT drive or operate machinery at home or at work for at least 24 hours      VISIT THE EMERGENCY ROOM OR URGENT CARE IF:    ? There is redness or swelling at the injection site  ? There is discharge from the injection site  ? You develop a temperature of 101  F or greater      ADDITIONAL INSTRUCTIONS:    ? You may resume your Coumadin or other blood thinner at your regular dose today.  Follow up with your physician to have your INR rechecked if indicated.  ? If you gain no relief from the injection after two (2) weeks, follow-up with your provider for your options.        Contacts:    During business hours from 8 to 5 pm, you may call [phone] to reach a nurse advisor at [facility].  After hours, call [facility]  [phone].  Ask for the Radiologist on-call.  Someone is on-call 24 hrs/day.  [facility] Toll Free Number   [phone]

## 2020-10-16 NOTE — PROGRESS NOTES
: Lashonda was seen in X-ray today for a lumbar epidural injection. Patient rated pain before procedure 0/10. After procedure patient rated pain 0/10. This pain level is acceptable to patient. Patient discharged home with .

## 2020-11-21 DIAGNOSIS — Z00.00 ROUTINE GENERAL MEDICAL EXAMINATION AT A HEALTH CARE FACILITY: ICD-10-CM

## 2020-11-21 PROCEDURE — 80061 LIPID PANEL: CPT | Performed by: PREVENTIVE MEDICINE

## 2020-11-21 PROCEDURE — 36415 COLL VENOUS BLD VENIPUNCTURE: CPT | Performed by: PREVENTIVE MEDICINE

## 2020-11-21 PROCEDURE — 82947 ASSAY GLUCOSE BLOOD QUANT: CPT | Performed by: PREVENTIVE MEDICINE

## 2020-11-23 LAB
CHOLEST SERPL-MCNC: 159 MG/DL
GLUCOSE SERPL-MCNC: 83 MG/DL (ref 70–99)
HDLC SERPL-MCNC: 54 MG/DL
LDLC SERPL CALC-MCNC: 89 MG/DL
NONHDLC SERPL-MCNC: 105 MG/DL
TRIGL SERPL-MCNC: 82 MG/DL

## 2021-01-05 ENCOUNTER — TELEPHONE (OUTPATIENT)
Dept: FAMILY MEDICINE | Facility: CLINIC | Age: 51
End: 2021-01-05

## 2021-01-05 DIAGNOSIS — L65.9 HAIR LOSS: Primary | ICD-10-CM

## 2021-01-05 NOTE — TELEPHONE ENCOUNTER
Reason for call: Patient needs referral order for follow up visits to [facility] for the Hair loss. Patient's 1st visit was 9/29/2020 and needs a 1 year referral for follow ups.    Can we leave a detailed message: Brandon Novoa at [facility]  can be reached at: [phone]    Call taken at 10:00 am  on 1/5/2021    Fax # to fax referral order to Negar Novoa [facility] [phone].    Daniela Vera [facility]  2nd Floor  Primary Care

## 2021-01-05 NOTE — TELEPHONE ENCOUNTER
This can wait for PRIMARY CARE PROVIDER to return, initial referral placed late august 2020.    Octavio Tabares PA-C

## 2021-01-11 ENCOUNTER — ANCILLARY PROCEDURE (OUTPATIENT)
Dept: MAMMOGRAPHY | Facility: CLINIC | Age: 51
End: 2021-01-11
Attending: PREVENTIVE MEDICINE
Payer: COMMERCIAL

## 2021-01-11 DIAGNOSIS — Z12.31 VISIT FOR SCREENING MAMMOGRAM: ICD-10-CM

## 2021-01-11 PROCEDURE — 77063 BREAST TOMOSYNTHESIS BI: CPT | Mod: GC | Performed by: STUDENT IN AN ORGANIZED HEALTH CARE EDUCATION/TRAINING PROGRAM

## 2021-01-11 PROCEDURE — 77067 SCR MAMMO BI INCL CAD: CPT | Mod: GC | Performed by: STUDENT IN AN ORGANIZED HEALTH CARE EDUCATION/TRAINING PROGRAM

## 2021-01-12 NOTE — TELEPHONE ENCOUNTER
Printed and faxed referral order to [employer], farzad Sommer, [phone], right fax confirmed at 2:06 pm today, 1/12/2021.  Daniela Vera MA  [facility]  2nd Floor  Primary Care

## 2021-06-18 ENCOUNTER — ANCILLARY PROCEDURE (OUTPATIENT)
Dept: GENERAL RADIOLOGY | Facility: CLINIC | Age: 51
End: 2021-06-18
Attending: PHYSICIAN ASSISTANT
Payer: COMMERCIAL

## 2021-06-18 ENCOUNTER — OFFICE VISIT (OUTPATIENT)
Dept: URGENT CARE | Facility: URGENT CARE | Age: 51
End: 2021-06-18
Payer: COMMERCIAL

## 2021-06-18 VITALS
RESPIRATION RATE: 14 BRPM | OXYGEN SATURATION: 99 % | TEMPERATURE: 97 F | SYSTOLIC BLOOD PRESSURE: 138 MMHG | WEIGHT: 140 LBS | DIASTOLIC BLOOD PRESSURE: 88 MMHG | HEART RATE: 85 BPM | BODY MASS INDEX: 25 KG/M2

## 2021-06-18 DIAGNOSIS — S69.91XA WRIST INJURY, RIGHT, INITIAL ENCOUNTER: ICD-10-CM

## 2021-06-18 DIAGNOSIS — S63.501A WRIST SPRAIN, RIGHT, INITIAL ENCOUNTER: Primary | ICD-10-CM

## 2021-06-18 PROCEDURE — 73110 X-RAY EXAM OF WRIST: CPT | Mod: RT | Performed by: RADIOLOGY

## 2021-06-18 PROCEDURE — 99213 OFFICE O/P EST LOW 20 MIN: CPT | Performed by: PHYSICIAN ASSISTANT

## 2021-06-18 RX ORDER — NAPROXEN 500 MG/1
500 TABLET ORAL 2 TIMES DAILY WITH MEALS
Qty: 20 TABLET | Refills: 1 | Status: SHIPPED | OUTPATIENT
Start: 2021-06-18 | End: 2023-03-23

## 2021-06-18 NOTE — PROGRESS NOTES
Medical Decision Making:    Differential Diagnosis:  MS Injury Pain: sprain, fracture, tendonitis, muscle strain, contusion, dislocation and osteoarthritis      xrays- I see no fracture.    Results for orders placed or performed in visit on 06/18/21   XR Wrist Right G/E 3 Views     Status: None    Narrative    XR WRIST RT G/E 3 VW 6/18/2021 7:05 PM     HISTORY: Wrist injury, right, initial encounter    COMPARISON: None.      Impression    IMPRESSION: Normal.    JENN ACOSTA MD         ASSESSMENT:          PLAN: Right wrist sprain.  Ice, elevate, Naprosyn.  Follow-up with primary 1 week if not improving.  Wrist splint.  Advised about symptoms which might herald more serious problems.            Sheila Cordoba PA-C      SUBJECTIVE:   Lashonda Rivera is an 50 year old female who presents with right wrist injury that happened this evening.  She moved into a [place] recently and purchased a toilet at [employer].  There was only one employee to help her load it into her car and as she was pulling it by the handle on the box she felt a sudden pop in her wrist and sudden pain.  No numbness or tingling.  She is right-handed.  No fever.        Allergies   Allergen Reactions     Medrol [Methylprednisolone] Itching     Vicodin [Hydrocodone-Acetaminophen]      itchy       Past Medical History:   Diagnosis Date     Adjustment disorder with depressed mood 1/19/2016     Breast fibroadenoma, right 2016    excised     Complication of anesthesia     nausea/ use scop. patches     H/O: hysterectomy 2013     Hypothyroidism 2005    Now resolved     Leiomyoma of uterus, unspecified 2013    resolved     Menorrhagia 2000    resolved     Migraine headache 10/2/2012     Recurrent UTI 2012     TBI (traumatic brain injury), without LOC, sequela 9/12/2017     Vitamin B 12 deficiency 2005    Resolved       albuterol (PROAIR HFA/PROVENTIL HFA/VENTOLIN HFA) 108 (90 Base) MCG/ACT inhaler, Inhale 2 puffs into the lungs  every 4 hours as needed for shortness of breath / dyspnea or wheezing  diclofenac (VOLTAREN) 75 MG EC tablet, Take 1 tablet (75 mg) by mouth 2 times daily as needed (Patient not taking: Reported on 9/22/2020)  fluticasone (FLONASE) 50 MCG/ACT nasal spray, Spray 1-2 sprays into both nostrils daily  gabapentin (NEURONTIN) 100 MG capsule, Take 1-2 capsules (100-200 mg) by mouth At Bedtime  metoclopramide (REGLAN) 10 MG tablet, Take 1 tablet (10 mg) by mouth 2 times daily as needed (migraine or nausea)  Multiple Vitamins-Minerals (HAIR SKIN NAILS) CAPS,   NAPROXEN 500 MG TBEC, TK 1 T PO BID PRF HEADACHES  Probiotic Product (PROBIOTIC DAILY PO), Take 2 tablets by mouth daily   SUMAtriptan (IMITREX) 100 MG tablet, Take 1 tablet (100 mg) by mouth at onset of headache for migraine May repeat in 2 hours if needed: max 2/day  tiZANidine (ZANAFLEX) 4 MG tablet,     No current facility-administered medications on file prior to visit.       Social History     Tobacco Use     Smoking status: Never Smoker     Smokeless tobacco: Never Used   Substance Use Topics     Alcohol use: No     Alcohol/week: 0.0 standard drinks     Drug use: No       ROS:  Gen: no fevers  Musculoskel: + as above  Skin: as above    OBJECTIVE:  /88   Pulse 85   Temp 97  F (36.1  C)   Resp 14   Wt 63.5 kg (140 lb)   LMP 06/10/2013 (Exact Date)   SpO2 99%   BMI 25.00 kg/m      LMP 06/10/2013 (Exact Date)    General:   awake, alert, and cooperative.  NAD.   Head: Normocephalic, atraumatic.  Eyes: Conjunctiva clear,   MS: Right lateral wrist is with mild swelling.  Tender distal ulna area.  Decreased range of motion all planes with increased pain.  Able to straighten all her fingers but does cause pain in the wrist.  Good palpable radial pulse.  Sensation to soft touch intact.    Neuro: Alert and oriented - normal speech.      Sheila Cordoba PA-C

## 2021-07-06 ENCOUNTER — OFFICE VISIT (OUTPATIENT)
Dept: FAMILY MEDICINE | Facility: CLINIC | Age: 51
End: 2021-07-06
Payer: COMMERCIAL

## 2021-07-06 VITALS
BODY MASS INDEX: 25.69 KG/M2 | TEMPERATURE: 97.3 F | HEIGHT: 63 IN | WEIGHT: 145 LBS | OXYGEN SATURATION: 99 % | DIASTOLIC BLOOD PRESSURE: 84 MMHG | SYSTOLIC BLOOD PRESSURE: 129 MMHG | HEART RATE: 75 BPM

## 2021-07-06 DIAGNOSIS — S63.501D SPRAIN OF RIGHT WRIST, SUBSEQUENT ENCOUNTER: Primary | ICD-10-CM

## 2021-07-06 DIAGNOSIS — R68.89 COLD INTOLERANCE: ICD-10-CM

## 2021-07-06 LAB
ALBUMIN SERPL-MCNC: 4 G/DL (ref 3.4–5)
ALP SERPL-CCNC: 42 U/L (ref 40–150)
ALT SERPL W P-5'-P-CCNC: 25 U/L (ref 0–50)
ANION GAP SERPL CALCULATED.3IONS-SCNC: 4 MMOL/L (ref 3–14)
AST SERPL W P-5'-P-CCNC: 17 U/L (ref 0–45)
BASOPHILS # BLD AUTO: 0 10E9/L (ref 0–0.2)
BASOPHILS NFR BLD AUTO: 0.2 %
BILIRUB SERPL-MCNC: 0.5 MG/DL (ref 0.2–1.3)
BUN SERPL-MCNC: 9 MG/DL (ref 7–30)
CALCIUM SERPL-MCNC: 8.5 MG/DL (ref 8.5–10.1)
CHLORIDE SERPL-SCNC: 106 MMOL/L (ref 94–109)
CO2 SERPL-SCNC: 26 MMOL/L (ref 20–32)
CREAT SERPL-MCNC: 0.89 MG/DL (ref 0.52–1.04)
DIFFERENTIAL METHOD BLD: NORMAL
EOSINOPHIL # BLD AUTO: 0.1 10E9/L (ref 0–0.7)
EOSINOPHIL NFR BLD AUTO: 1.4 %
ERYTHROCYTE [DISTWIDTH] IN BLOOD BY AUTOMATED COUNT: 12.9 % (ref 10–15)
FERRITIN SERPL-MCNC: 112 NG/ML (ref 8–252)
GFR SERPL CREATININE-BSD FRML MDRD: 76 ML/MIN/{1.73_M2}
GLUCOSE SERPL-MCNC: 75 MG/DL (ref 70–99)
HCT VFR BLD AUTO: 37.9 % (ref 35–47)
HGB BLD-MCNC: 12.4 G/DL (ref 11.7–15.7)
LYMPHOCYTES # BLD AUTO: 1.6 10E9/L (ref 0.8–5.3)
LYMPHOCYTES NFR BLD AUTO: 37.1 %
MCH RBC QN AUTO: 30.8 PG (ref 26.5–33)
MCHC RBC AUTO-ENTMCNC: 32.7 G/DL (ref 31.5–36.5)
MCV RBC AUTO: 94 FL (ref 78–100)
MONOCYTES # BLD AUTO: 0.3 10E9/L (ref 0–1.3)
MONOCYTES NFR BLD AUTO: 6.8 %
NEUTROPHILS # BLD AUTO: 2.4 10E9/L (ref 1.6–8.3)
NEUTROPHILS NFR BLD AUTO: 54.5 %
PLATELET # BLD AUTO: 295 10E9/L (ref 150–450)
POTASSIUM SERPL-SCNC: 4.1 MMOL/L (ref 3.4–5.3)
PROT SERPL-MCNC: 7.2 G/DL (ref 6.8–8.8)
RBC # BLD AUTO: 4.03 10E12/L (ref 3.8–5.2)
SODIUM SERPL-SCNC: 136 MMOL/L (ref 133–144)
TSH SERPL DL<=0.005 MIU/L-ACNC: 0.58 MU/L (ref 0.4–4)
WBC # BLD AUTO: 4.4 10E9/L (ref 4–11)

## 2021-07-06 PROCEDURE — 80050 GENERAL HEALTH PANEL: CPT | Performed by: PREVENTIVE MEDICINE

## 2021-07-06 PROCEDURE — 99214 OFFICE O/P EST MOD 30 MIN: CPT | Performed by: PREVENTIVE MEDICINE

## 2021-07-06 PROCEDURE — 36415 COLL VENOUS BLD VENIPUNCTURE: CPT | Performed by: PREVENTIVE MEDICINE

## 2021-07-06 PROCEDURE — 82728 ASSAY OF FERRITIN: CPT | Performed by: PREVENTIVE MEDICINE

## 2021-07-06 ASSESSMENT — PAIN SCALES - GENERAL: PAINLEVEL: EXTREME PAIN (8)

## 2021-07-06 ASSESSMENT — MIFFLIN-ST. JEOR: SCORE: 1252.88

## 2021-07-06 NOTE — RESULT ENCOUNTER NOTE
Lashonda,     Basic blood count is not showing anemia or infection. Other results are pending.     Please do not hesitate to call us at [phone] if you have any questions or concerns.    Thank you,    Asmita Bhatia MD MPH

## 2021-07-06 NOTE — PROGRESS NOTES
"    Assessment & Plan     Sprain of right wrist, subsequent encounter  -Rest, Ice, Brace  -await results of MRI  -Acetaminophen 1000 mg every 8 hours as needed for pain, Naprosyn 2 times a day as needed with food  -may need work forms completed  -differentials include occult fracture, high grade sprain, muscle tear.   - MR Wrist Right w/o Contrast  - Orthopedic  Referral    Cold intolerance  -await labs   - CBC with platelets differential  - Comprehensive metabolic panel  - TSH with free T4 reflex  - Ferritin  -symptoms for a month     20 minutes spent on the date of the encounter doing chart review, history and exam, documentation and further activities per the note       BMI:   Estimated body mass index is 25.89 kg/m  as calculated from the following:    Height as of this encounter: 1.594 m (5' 2.75\").    Weight as of this encounter: 65.8 kg (145 lb).       Return in about 1 week (around 7/13/2021) if symptoms worsen or fail to improve.    Asmita Bhatia MD MPH    [facility] 50 [facility] who presents for the following health issues    HPI     ED/UC Followup:    Facility:  Urgent Care   Date of visit: 6/18/21  Reason for visit: Right wrist sprain   Current Status: not better      X rays were negative for bony abnormalities    -Right wrist injury 6/18/21.    -bought a toilet at [employer], there was only one employee to help her load it into her car and as she was pulling it by the handle on the box she felt a sudden pop in her wrist and sudden pain.    -No numbness or tingling.    -She is right-handed.    -No fever.    -has been having pain  -more pain with typing  -pain is going up to the elbow  -Tingling in the fingers  -using a brace,   -preventing sleep and having problems with self care  -has taken a week off from work, may need forms completed  -Has taken Naprosyn just once    Has had cold intolerance:  -feet, Hands and nose  -symptoms for a " "month now  -no fever   -no rash  -no night sweats  -no new joint pains   -no bowel changes       Review of Systems   Constitutional, HEENT, cardiovascular, pulmonary, gi and gu systems are negative, except as otherwise noted.      Objective    /84 (BP Location: Left arm, Patient Position: Sitting, Cuff Size: Adult Regular)   Pulse 75   Temp 97.3  F (36.3  C) (Tympanic)   Ht 1.594 m (5' 2.75\")   Wt 65.8 kg (145 lb)   LMP 06/10/2013 (Exact Date)   SpO2 99%   BMI 25.89 kg/m    Body mass index is 25.89 kg/m .  Physical Exam   GENERAL APPEARANCE: healthy, alert and no distress  EYES: Eyes grossly normal to inspection and conjunctivae and sclerae normal  MS: extremities normal- no gross deformities noted and peripheral pulses normal  SKIN: no suspicious lesions or rashes  NEURO: Normal strength and tone, mentation intact and speech normal  PSYCH: mentation appears normal  Right hand/wrist: edema+, pain with range of motion, strength decreased, tender along the radial styloid, capillary refill less than 2 seconds. Sensation intact        XR WRIST RT G/E 3 VW 6/18/2021 7:05 PM      HISTORY: Wrist injury, right, initial encounter     COMPARISON: None.                                                                      IMPRESSION: Normal.     JENN ACOSTA MD    "

## 2021-07-07 NOTE — RESULT ENCOUNTER NOTE
Lashonda,     Iron stores, thyroid function, electrolytes, glucose, kidney function and liver function tests are normal.     Please do not hesitate to call us at [phone] if you have any questions or concerns.    Thank you,    Asmita Bhatia MD MPH

## 2021-07-08 ENCOUNTER — TRANSFERRED RECORDS (OUTPATIENT)
Dept: HEALTH INFORMATION MANAGEMENT | Facility: CLINIC | Age: 51
End: 2021-07-08

## 2021-07-21 ENCOUNTER — ANCILLARY PROCEDURE (OUTPATIENT)
Dept: MRI IMAGING | Facility: CLINIC | Age: 51
End: 2021-07-21
Attending: PREVENTIVE MEDICINE
Payer: COMMERCIAL

## 2021-07-21 DIAGNOSIS — S63.501D SPRAIN OF RIGHT WRIST, SUBSEQUENT ENCOUNTER: ICD-10-CM

## 2021-07-21 PROCEDURE — 73221 MRI JOINT UPR EXTREM W/O DYE: CPT | Mod: RT | Performed by: RADIOLOGY

## 2021-07-22 NOTE — RESULT ENCOUNTER NOTE
Lashonda,     MRI of the wrist is not showing any fractures.   Moderate inflammation in the wrist tendons seen.  There is also a partial tear in one of the ligaments, this may be chronic.   Please follow up with Orthopedics/Sports Medicine (referral provided during clinic visit).     Please do not hesitate to call us at [phone] if you have any questions or concerns.    Thank you,    Asmita Bhatia MD MPH

## 2021-08-18 ENCOUNTER — OFFICE VISIT (OUTPATIENT)
Dept: ORTHOPEDICS | Facility: CLINIC | Age: 51
End: 2021-08-18
Payer: COMMERCIAL

## 2021-08-18 VITALS — BODY MASS INDEX: 25.69 KG/M2 | HEIGHT: 63 IN | WEIGHT: 145 LBS

## 2021-08-18 DIAGNOSIS — M25.531 RIGHT WRIST PAIN: Primary | ICD-10-CM

## 2021-08-18 PROCEDURE — 99214 OFFICE O/P EST MOD 30 MIN: CPT | Performed by: FAMILY MEDICINE

## 2021-08-18 ASSESSMENT — MIFFLIN-ST. JEOR: SCORE: 1252.88

## 2021-08-18 NOTE — LETTER
8/18/2021         RE: Lashonda Rivera  [address]  [place] [address]        Dear Colleague,    Thank you for referring your patient, Lashonda Rivera, to the [facility] SPORTS MEDICINE [facility]. Please see a copy of my visit note below.      [facility]  SPORTS MEDICINE CLINIC VISIT     Aug 18, 2021        ASSESSMENT & PLAN    42-year-old with right wrist pain for the past 2 months after an injury.  And demonstrated TFCC injury and ECU tendinopathy on MRI.    Reviewed imaging and assessment with patient in detail  She will continue to use the brace as needed for comfort.  Have referred to hand therapy for treatment and possible fabrication of a brace that might be more functional for her than the one she is currently using.  I recommended out of the brace and icing once daily with gentle range of motion exercises as tolerated.  May consider steroid injection, ultrasound-guided if therapy exercises proved to be more painful.  Follow-up in 6 to 8 weeks if her symptoms are not improving.    Tyrese Ortiz MD  [facility] SPORTS MEDICINE [facility]    -----  Chief Complaint   Patient presents with     Consult     right wrist injury, DOI 6/18/21        SUBJECTIVE  Lashonda Rivera is a/an 50 year old female who is seen as a self referral for evaluation of  Right wrist pain.     The patient is seen by themselves.  The patient is Right handed    Onset: 2 month(s) ago. Patient describes injury as lifting a toilet into her car at [employer].  [place] a pop in the ulnar side of her right wrist  Location of Pain: right wrist  Worsened by: use  Better with: Been using a wrist splint full-time.  Treatments tried: rest/activity avoidance and casting/splinting/bracing  Associated symptoms: no distal numbness or tingling; denies swelling or warmth    Orthopedic/Surgical history: NO  Social History/Occupation:        REVIEW OF SYSTEMS:    Do you have fever,  "chills, weight loss? No    Do you have any vision problems? No    Do you have any chest pain or edema? No    Do you have any shortness of breath or wheezing?  No    Do you have stomach problems? No    Do you have any numbness or focal weakness? No    Do you have diabetes? No    Do you have problems with bleeding or clotting? No    Do you have an rashes or other skin lesions? No    OBJECTIVE:  Ht 1.594 m (5' 2.75\")   Wt 65.8 kg (145 lb)   LMP 06/10/2013 (Exact Date)   BMI 25.89 kg/m       General  - alert, pleasant, no distress  CV  - normal radial pulse, cap refill brisk  Musculoskeletal - right wrist  - inspection: normal joint alignment, no obvious deformity, no swelling  - palpation: TTP over the ulnar styloid and ECU tendon as well as over the fovea   - ROM: Very limited due to pain though no mechanical obstruction is noted  - strength: Intact but pain with wrist extension and ulnar deviation against resistance  - special tests:  (-) Tinel's  (-) Finkelstein  (-) Phalen  (-) Ayala click test  (+) ulnar impaction  Neuro  - no sensory or motor deficit, grossly normal coordination, normal muscle tone  Skin  - no ecchymosis, erythema, warmth, or induration, no obvious rash          RADIOLOGY:    Three-view x-rays of the right wrist dated 6/18/2021 were reviewed.  Per independent review no acute fracture or dislocation.  No significant DJD.  See EMR for formal radiology report.      MRI of the right wrist dated 7/21/2021.  Per radiology report:  IMPRESSION:  1. 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 The extensor carpi ulnaris is located just  deep to the external marker placed in the area of patient's pain.  Remainder of the extensor tendons are unremarkable.  2.  Partial-thickness tear of the foveal and ulnar styloid attachments  of the triangular fibrocartilage, subacute/ chronic appearing. " Radial  attachment of the TFCC is intact  3. Flexor tendons are unremarkable.                 Again, thank you for allowing me to participate in the care of your patient.        Sincerely,        Tyrese Ortiz MD

## 2021-08-18 NOTE — PROGRESS NOTES
[facility]  SPORTS MEDICINE CLINIC VISIT     Aug 18, 2021        ASSESSMENT & PLAN    42-year-old with right wrist pain for the past 2 months after an injury.  And demonstrated TFCC injury and ECU tendinopathy on MRI.    Reviewed imaging and assessment with patient in detail  She will continue to use the brace as needed for comfort.  Have referred to hand therapy for treatment and possible fabrication of a brace that might be more functional for her than the one she is currently using.  I recommended out of the brace and icing once daily with gentle range of motion exercises as tolerated.  May consider steroid injection, ultrasound-guided if therapy exercises proved to be more painful.  Follow-up in 6 to 8 weeks if her symptoms are not improving.    Tyrese Ortiz MD  [facility] SPORTS MEDICINE [facility]    -----  Chief Complaint   Patient presents with     Consult     right wrist injury, DOI 6/18/21        SUBJECTIVE  Lashondajoanie Rivera is a/an 50 year old female who is seen as a self referral for evaluation of  Right wrist pain.     The patient is seen by themselves.  The patient is Right handed    Onset: 2 month(s) ago. Patient describes injury as lifting a toilet into her car at [employer].  [place] a pop in the ulnar side of her right wrist  Location of Pain: right wrist  Worsened by: use  Better with: Been using a wrist splint full-time.  Treatments tried: rest/activity avoidance and casting/splinting/bracing  Associated symptoms: no distal numbness or tingling; denies swelling or warmth    Orthopedic/Surgical history: NO  Social History/Occupation:        REVIEW OF SYSTEMS:    Do you have fever, chills, weight loss? No    Do you have any vision problems? No    Do you have any chest pain or edema? No    Do you have any shortness of breath or wheezing?  No    Do you have stomach problems? No    Do you have any numbness or focal weakness? No    Do you have diabetes? No    Do  "you have problems with bleeding or clotting? No    Do you have an rashes or other skin lesions? No    OBJECTIVE:  Ht 1.594 m (5' 2.75\")   Wt 65.8 kg (145 lb)   LMP 06/10/2013 (Exact Date)   BMI 25.89 kg/m       General  - alert, pleasant, no distress  CV  - normal radial pulse, cap refill brisk  Musculoskeletal - right wrist  - inspection: normal joint alignment, no obvious deformity, no swelling  - palpation: TTP over the ulnar styloid and ECU tendon as well as over the fovea   - ROM: Very limited due to pain though no mechanical obstruction is noted  - strength: Intact but pain with wrist extension and ulnar deviation against resistance  - special tests:  (-) Tinel's  (-) Finkelstein  (-) Phalen  (-) Ayala click test  (+) ulnar impaction  Neuro  - no sensory or motor deficit, grossly normal coordination, normal muscle tone  Skin  - no ecchymosis, erythema, warmth, or induration, no obvious rash          RADIOLOGY:    Three-view x-rays of the right wrist dated 6/18/2021 were reviewed.  Per independent review no acute fracture or dislocation.  No significant DJD.  See EMR for formal radiology report.      MRI of the right wrist dated 7/21/2021.  Per radiology report:  IMPRESSION:  1. 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 The extensor carpi ulnaris is located just  deep to the external marker placed in the area of patient's pain.  Remainder of the extensor tendons are unremarkable.  2.  Partial-thickness tear of the foveal and ulnar styloid attachments  of the triangular fibrocartilage, subacute/ chronic appearing. Radial  attachment of the TFCC is intact  3. Flexor tendons are unremarkable.             "

## 2021-08-30 ENCOUNTER — THERAPY VISIT (OUTPATIENT)
Dept: OCCUPATIONAL THERAPY | Facility: CLINIC | Age: 51
End: 2021-08-30
Payer: COMMERCIAL

## 2021-08-30 DIAGNOSIS — M25.531 RIGHT WRIST PAIN: ICD-10-CM

## 2021-08-30 DIAGNOSIS — S69.81XA INJURY OF TRIANGULAR FIBROCARTILAGE COMPLEX (TFCC) OF RIGHT WRIST, INITIAL ENCOUNTER: ICD-10-CM

## 2021-08-30 DIAGNOSIS — M77.9 TENDINITIS: ICD-10-CM

## 2021-08-30 PROCEDURE — 97760 ORTHOTIC MGMT&TRAING 1ST ENC: CPT | Mod: GO | Performed by: OCCUPATIONAL THERAPIST

## 2021-08-30 PROCEDURE — 97110 THERAPEUTIC EXERCISES: CPT | Mod: GO | Performed by: OCCUPATIONAL THERAPIST

## 2021-08-30 PROCEDURE — 97165 OT EVAL LOW COMPLEX 30 MIN: CPT | Mod: GO | Performed by: OCCUPATIONAL THERAPIST

## 2021-08-30 NOTE — PROGRESS NOTES
Hand Therapy Initial Evaluation    Current Date:  8/30/2021  Referring Physician: Dr. 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    Patient reports symptoms of pain, stiffness/loss of motion, weakness/loss of strength, numbness and tingling  of the right wrist and fingers which occurred due to an injury. Patient describes injury as lifting a toilet into her car at [employer].  [place] a pop in the ulnar side of her right wrist. Since onset symptoms are Gradually getting worse.  Special tests:  x-ray and MRI.   IMPRESSION:  1. 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 The extensor carpi ulnaris is located just  deep to the external marker placed in the area of patient's pain.  Remainder of the extensor tendons are unremarkable.  2.  Partial-thickness tear of the foveal and ulnar styloid attachments  of the triangular fibrocartilage, subacute/ chronic appearing. Radial      attachment of the TFCC is intact   Previous treatment: OTC wrist support.    General health as reported by patient is fair.  Pertinent medical history includes:None  Medical allergies:none.  Surgical history: none.  Medication history: multi vitamin.    Occupational Profile Information:  Current occupation is   Currently working in normal job without restrictions  Job Tasks: Computer Work, Prolonged Sitting, Repetitive Tasks  Prior functional level:  no limitations  Barriers include:none  Mobility: No difficulty  Transportation: drives  Leisure activities/hobbies: walking, bike riding    Upper Extremity Functional Index Score:  SCORE:   Column Totals: /80: 7   (A lower score indicates greater disability.)    Objective:  Pain Level (Scale 0-10)   8/30/2021   At Rest 7/10   With Use 10/10   Night 10/10     Pain  Description  Date 8/30/2021   Location Wrist, ulnar side and volar ulnar    Pain Quality Aching, Sharp and Shooting   Frequency intermittent     Pain is worst  daytime or nighttime   Exacerbated by  lifting, pulling, forearm rotation   Relieved by cold, rest, and elevation   Progression Gradually worsening     Edema  Mild  Edema - Measured circumferentially in cm  Date 8/30/21         Right Left Right Left Right Left Right Left Right Left Right Left   Distal Wrist Crease 15.7 15.4               Sensation   Decreased Median Nerve distribution per pt report, Decreased Ulnar Nerve distribution per pt report and Decreased Radial Nerve distribution per pt report.  Patient reports numbness and tingling in the palm and all fingers intermittently. Especially at night    ROM  Pain Report:  + mild    ++ moderate    +++ severe   Wrist 8/30/2021 8/30/2021   AROM (PROM) Left Right   Extension 60 35++   Flexion 60 20++   RD 20 0++   UD 40 25   Supination 80 40++   Pronation 75 45++     Digits: able to make a full fist and thumb opposition is WNL    Strength: deferred    Palpation  Pain Report:  - none    + mild    ++ moderate    +++ severe     8/30/21      TFCC ++      Volar ulnar side of wrist +++                                     Assessment:  Patient presents with symptoms consistent with diagnosis of right wrist  Pain and tendonitis,  with conservative intervention.     Patient's limitations or Problem List includes:  Pain, Decreased ROM/motion, Increased edema, Weakness, Decreased stability and Tightness in musculature of the right wrist which interferes with the patient's ability to perform Self Care Tasks (dressing, eating, bathing), Work Tasks, Sleep Patterns, Recreational Activities, Household Chores and Driving  as compared to previous level of function.    Rehab Potential:  Excellent - Return to full activity, no limitations    Patient will benefit from skilled Occupational Therapy to increase ROM, flexibility,  overall strength,  strength, stability of wrist and sensation and decrease pain and edema to return to previous activity level and resume normal daily tasks and to reach their rehab potential.    Barriers to Learning:  No barrier    Communication Issues:  Patient appears to be able to clearly communicate and understand verbal and written communication and follow directions correctly.    Chart Review: Chart Review, Brief history including review of medical and/or therapy records relating to the presenting problem and Simple history review with patient    Identified Performance Deficits: bathing/showering, dressing, driving and community mobility, home establishment and management, meal preparation and cleanup, sleep, work and leisure activities    Assessment of Occupational Performance:  5 or more Performance Deficits    Clinical Decision Making (Complexity): Low complexity    Treatment Explanation:  The following has been discussed with the patient:  RX ordered/plan of care  Anticipated outcomes  Possible risks and side effects    Plan:  Frequency:  2 X a month, once daily  Duration:  for 4 months    Treatment Plan:   Modalities:  US  Therapeutic Exercise:  AROM, AAROM, PROM, Isotonics, Isometrics and Stabilization  Neuromuscular re-education:  Nerve Gliding and Kinesiotaping  Manual Techniques:  Myofascial release  Orthotic Fabrication:  Static and Forearm based    Discharge Plan:  Achieve all LTG.  Independent in home treatment program.  Reach maximal therapeutic benefit.    Home Exercise Program:  Compliance with full time wear of zipper orthosis  Gentle AROM of wrist once per day  Tendon glides  Suggested patient get a vertical mouse and split keyboard for work  Avoid forearm rotation    Next Visit:  Check orthosis and adjust as indicated  Advance ROM and strengthening per healing and new orders from MD

## 2021-10-11 NOTE — PATIENT INSTRUCTIONS
At [facility], we strive to deliver an exceptional experience to you, every time we see you. If you receive a survey, please complete it as we do value your feedback.  If you have MyChart, you can expect to receive results automatically within 24 hours of their completion.  Your provider will send a note interpreting your results as well.   If you do not have MyChart, you should receive your results in about a week by mail.    Your care team:                            Family Medicine Internal Medicine   MD Husasin Albright MD Shantel Branch-Fleming, MD Srinivasa Vaka, MD Katya Belousova, PABARTOLO Pena, APRN CNP    Ralph Evans, MD Pediatrics   Octavio Tabares, PABARTOLO Valdez, CNP MD Kim Finney APRN CNP   MD Tiffani Gordon MD Deborah Mielke, MD Sommer Seo, APRN [facility]      Clinic hours: Monday - Thursday 7 am-6 pm; Fridays 7 am-5 pm.   Urgent care: Monday - Friday 10 am- 8 pm; Saturday and Sunday 9 am-5 pm.    Clinic: [phone]       [facility]: Monday - Thursday 8 am - 7 pm; Friday 8 am - 6 pm  [facility]: [phone]     Use [email or web address] for 24/7 diagnosis and treatment of dozens of conditions.    Patient Education     Unknown Causes of Abdominal Pain (Female)    The exact cause of your belly (abdominal) pain is not clear. This does not mean that this is something to worry about. Everyone likes to know the exact cause of the problem. But sometimes with belly pain, there is no clear-cut cause, and this could be a good thing. The good news is that your symptoms can be treated, and you will feel better.   Your condition does not seem serious now. But sometimes the signs of a serious problem may take more time to appear. For this reason, it is important for you to watch for any new symptoms, problems, or worsening of your condition.  Over the next few  days, the abdominal pain may come and go. Or it may be constant. Other common symptoms can include nausea and vomiting. Sometimes it can be difficult to tell if you feel nauseous. You may just feel bad and not connect that feeling to nausea. Constipation, diarrhea, and a fever may go along with the pain.  The pain may continue even if treated correctly over the following days. Depending on how things go, sometimes the cause can become clear and may need more or different treatment. Additional evaluations, medicines, or tests may also be needed.  Home care  Your healthcare provider may prescribe medicine for pain, symptoms, or an infection.  Follow the healthcare provider's instructions for taking these medicines.  General care    Rest as much as you can until your next exam. No strenuous activities.    Try to find positions that ease discomfort. A small pillow placed on the abdomen may help relieve pain.    Something warm on your abdomen (such as a heating pad) may help, but be careful not to burn yourself.  Diet    Don t force yourself to eat, especially if having cramps, vomiting, or diarrhea.    Water is important so you don't get dehydrated. Soup may also be good. Sports drinks may also help, especially if they are not too acidic. Don't drink sugary drinks as this can make things worse. Take liquids in small amounts. Don t guzzle them.    Caffeine sometimes makes the pain and cramping worse.    Don t take dairy products if you have vomiting or diarrhea.    Don't eat large amounts at a time. Wait a few minutes between bites.    Eat a diet low in fiber (called a low-residue diet). Foods allowed include refined breads, white rice, fruit and vegetable juices without pulp, tender meats. These foods will pass more easily through the intestine.    Don t have whole-grain foods, whole fruits and vegetables, meats, seeds and nuts, fried or fatty foods, dairy, alcohol and spicy foods until your symptoms go away.  Follow-up  care  Follow up with your healthcare provider, or as advised, if your pain does not begin to improve in the next 24 hours.  Call 911  Call 911 if any of these occur:    Trouble breathing    Confusion    Fainting or loss of consciousness    Rapid heart rate    Seizure  When to seek medical advice  Call your healthcare provider right away if any of these occur:    Pain gets worse or moves to the right lower abdomen    New or worsening vomiting or diarrhea    Swelling of the abdomen    Unable to pass stool for more than 3 days    Fever of 100.4 F (38 C) or higher, or as directed by your healthcare provider.    Blood in vomit or bowel movements (dark red or black color)    Yellow color of eyes and skin (jaundice)    Weakness, dizziness    Chest, arm, back, neck, or jaw pain    Unexpected vaginal bleeding or missed period    Can't keep down liquids or water and you are getting dehydrated  [employer]den last reviewed this educational content on 6/1/2018 2000-2021 [employer]. All rights reserved. This information is not intended as a substitute for professional medical care. Always follow your healthcare professional's instructions.

## 2021-10-11 NOTE — PROGRESS NOTES
"    Assessment & Plan     Pelvic pain in female  Checking UA, she denies vaginal concerns.Getting abdominal film as well.  - UA Macro with Reflex to Micro and Culture - lab collect  - UA Macro with Reflex to Micro and Culture - lab collect    Rectal pain  Moderate colonic stool, treating with Miralax, increased exercise, plenty of fluids  - XR Abdomen 1 View    Need for immunization against influenza    - HC FLU VAC PRESRV FREE QUAD SPLIT VIR > 6 MONTHS IM ([id number])    Constipation, unspecified constipation type  Increase fruit/veg consumtpion but consider cutting back some on the cruciferous veg as they are gas forming and may make symptoms worse until she has resolved her constipation issues.  Increase fluid intake as well.    - polyethylene glycol (MIRALAX) 17 GM/Dose powder  Dispense: 289 g; Refill: 3      Ordering of each unique test  Prescription drug management  16 minutes spent on the date of the encounter doing chart review, history and exam, documentation and further activities per the note       BMI:   Estimated body mass index is 25 kg/m  as calculated from the following:    Height as of this encounter: 1.594 m (5' 2.75\").    Weight as of this encounter: 63.5 kg (140 lb).       Regular exercise    Return in about 1 week (around 10/19/2021), or if symptoms worsen or fail to improve, for Follow up.    KENNEDI Swanson CNP  [facility]    Geraldine Gallego is a 51 year old who presents for the following health issues  HPI     Rectal pain , worse at night for the last month.  2 nights ago she was awakened from sleep.  Had normal colonoscopy 3 years ago.  No blood in/on stool, no mucus, normal BM's - daily soft, brown BM (on probiotic). Is having more gas than usual, no hemorrhoid history, no weight change, no change in appetite, no food intolerances. She is eating a lot of cruciferous vegetables.  1 month ago she had pelvic pain that radiates to RLQ and LLQ that " resolved on its own, no pelvic pain now. She hadtotal abdominal  hysterectomy 2013 for fibroids.       Review of Systems   Constitutional, HEENT, cardiovascular, pulmonary, gi and gu systems are negative, except as otherwise noted.      Objective    LMP 06/10/2013 (Exact Date)   There is no height or weight on file to calculate BMI.  Physical Exam   GENERAL: healthy, alert and no distress  EYES: Eyes grossly normal to inspection, PERRL and conjunctivae and sclerae normal  HENT: ear canals and TM's normal, nose and mouth without ulcers or lesions  NECK: no adenopathy, no asymmetry, masses, or scars and thyroid normal to palpation  RESP: lungs clear to auscultation - no rales, rhonchi or wheezes  CV: regular rate and rhythm, normal S1 S2, no S3 or S4, no murmur, click or rub, no peripheral edema and peripheral pulses strong  ABDOMEN: soft, nontender, no hepatosplenomegaly, no masses and bowel sounds normal   (female): normal female external genitalia, normal urethral meatus, vaginal mucosa, normal cervix/adnexa/uterus without masses or discharge  RECTAL (female): normal sphincter tone, no rectal masses  MS: no gross musculoskeletal defects noted, no edema  SKIN: no suspicious lesions or rashes  NEURO: Normal strength and tone, mentation intact and speech normal  BACK: no CVA tenderness, no paralumbar tenderness  PSYCH: mentation appears normal, affect normal/bright  LYMPH: normal ant/post cervical, supraclavicular nodes  inguinal: no adenopathy    Results for orders placed or performed in visit on 10/12/21   XR Abdomen 1 View     Status: None    Narrative    ABDOMEN ONE VIEW  10/12/2021 3:04 PM     HISTORY: Rectal pain    COMPARISON: None.      Impression    IMPRESSION: Moderate volume retained colonic stool. No small bowel  distention or gross free air. No abnormal calcifications.    LOUANN MANCINI MD         SYSTEM ID:  [id number]   Results for orders placed or performed in visit on 10/12/21   UA Macro with Reflex  to Micro and Culture - lab collect     Status: Normal    Specimen: Urine, Midstream   Result Value Ref Range    Color Urine Yellow Colorless, Straw, Light Yellow, Yellow    Appearance Urine Clear Clear    Glucose Urine Negative Negative mg/dL    Bilirubin Urine Negative Negative    Ketones Urine Negative Negative mg/dL    Specific Gravity Urine >=1.030 1.003 - 1.035    Blood Urine Negative Negative    pH Urine 6.0 5.0 - 7.0    Protein Albumin Urine Negative Negative mg/dL    Urobilinogen Urine 0.2 0.2, 1.0 E.U./dL    Nitrite Urine Negative Negative    Leukocyte Esterase Urine Negative Negative    Narrative    Microscopic not indicated

## 2021-10-12 ENCOUNTER — ANCILLARY PROCEDURE (OUTPATIENT)
Dept: GENERAL RADIOLOGY | Facility: CLINIC | Age: 51
End: 2021-10-12
Attending: NURSE PRACTITIONER
Payer: COMMERCIAL

## 2021-10-12 ENCOUNTER — OFFICE VISIT (OUTPATIENT)
Dept: FAMILY MEDICINE | Facility: CLINIC | Age: 51
End: 2021-10-12
Payer: COMMERCIAL

## 2021-10-12 VITALS
WEIGHT: 140 LBS | DIASTOLIC BLOOD PRESSURE: 76 MMHG | OXYGEN SATURATION: 99 % | HEART RATE: 94 BPM | HEIGHT: 63 IN | SYSTOLIC BLOOD PRESSURE: 122 MMHG | TEMPERATURE: 97 F | BODY MASS INDEX: 24.8 KG/M2

## 2021-10-12 DIAGNOSIS — Z23 NEED FOR IMMUNIZATION AGAINST INFLUENZA: ICD-10-CM

## 2021-10-12 DIAGNOSIS — R10.2 PELVIC PAIN IN FEMALE: Primary | ICD-10-CM

## 2021-10-12 DIAGNOSIS — K62.89 RECTAL PAIN: ICD-10-CM

## 2021-10-12 DIAGNOSIS — K59.00 CONSTIPATION, UNSPECIFIED CONSTIPATION TYPE: ICD-10-CM

## 2021-10-12 LAB
ALBUMIN UR-MCNC: NEGATIVE MG/DL
APPEARANCE UR: CLEAR
BILIRUB UR QL STRIP: NEGATIVE
COLOR UR AUTO: YELLOW
GLUCOSE UR STRIP-MCNC: NEGATIVE MG/DL
HGB UR QL STRIP: NEGATIVE
KETONES UR STRIP-MCNC: NEGATIVE MG/DL
LEUKOCYTE ESTERASE UR QL STRIP: NEGATIVE
NITRATE UR QL: NEGATIVE
PH UR STRIP: 6 [PH] (ref 5–7)
SP GR UR STRIP: >=1.03 (ref 1–1.03)
UROBILINOGEN UR STRIP-ACNC: 0.2 E.U./DL

## 2021-10-12 PROCEDURE — 90686 IIV4 VACC NO PRSV 0.5 ML IM: CPT | Performed by: NURSE PRACTITIONER

## 2021-10-12 PROCEDURE — 99214 OFFICE O/P EST MOD 30 MIN: CPT | Mod: 25 | Performed by: NURSE PRACTITIONER

## 2021-10-12 PROCEDURE — 74018 RADEX ABDOMEN 1 VIEW: CPT | Performed by: RADIOLOGY

## 2021-10-12 PROCEDURE — 81003 URINALYSIS AUTO W/O SCOPE: CPT | Performed by: NURSE PRACTITIONER

## 2021-10-12 PROCEDURE — 90471 IMMUNIZATION ADMIN: CPT | Performed by: NURSE PRACTITIONER

## 2021-10-12 ASSESSMENT — PAIN SCALES - GENERAL: PAINLEVEL: NO PAIN (0)

## 2021-10-12 ASSESSMENT — MIFFLIN-ST. JEOR: SCORE: 1225.2

## 2021-10-15 RX ORDER — POLYETHYLENE GLYCOL 3350 17 G/17G
1 POWDER, FOR SOLUTION ORAL 2 TIMES DAILY
Qty: 289 G | Refills: 3 | Status: SHIPPED | OUTPATIENT
Start: 2021-10-15 | End: 2023-12-26

## 2021-10-24 ENCOUNTER — HEALTH MAINTENANCE LETTER (OUTPATIENT)
Age: 51
End: 2021-10-24

## 2021-10-27 ENCOUNTER — OFFICE VISIT (OUTPATIENT)
Dept: ORTHOPEDICS | Facility: CLINIC | Age: 51
End: 2021-10-27
Payer: COMMERCIAL

## 2021-10-27 VITALS — BODY MASS INDEX: 25 KG/M2 | WEIGHT: 140 LBS

## 2021-10-27 DIAGNOSIS — M25.531 RIGHT WRIST PAIN: Primary | ICD-10-CM

## 2021-10-27 PROCEDURE — 20550 NJX 1 TENDON SHEATH/LIGAMENT: CPT | Mod: RT | Performed by: FAMILY MEDICINE

## 2021-10-27 PROCEDURE — 76942 ECHO GUIDE FOR BIOPSY: CPT | Performed by: FAMILY MEDICINE

## 2021-10-27 PROCEDURE — 99207 PR DROP WITH A PROCEDURE: CPT | Performed by: FAMILY MEDICINE

## 2021-10-27 RX ADMIN — BETAMETHASONE SODIUM PHOSPHATE AND BETAMETHASONE ACETATE 6 MG: 3; 3 INJECTION, SUSPENSION INTRA-ARTICULAR; INTRALESIONAL; INTRAMUSCULAR; SOFT TISSUE at 13:46

## 2021-10-27 NOTE — PATIENT INSTRUCTIONS
Ice to wrist today and then daily as needed  May use tylenol or ibuprofen as needed  Consider using topical diclofenac (Voltaren gel) on the wrist up to 4 times daily for pain  No pools or hot tubs for 48 hours  Avoid vigorous activity for 48 hours after.  Then advance as tolerated.  May do Hand therapy as tolerated.  Recommend following up with hand therapy for modification of your splint if necessary as well as adjustment to your home exercise regimen.    Return to clinic with severe pain, warmth from the joint, or redness, as these may be signs of infection after your injection.

## 2021-10-27 NOTE — LETTER
10/27/2021         RE: Lashonda Rivera  [address]  [facility] [address]        Dear Colleague,    Thank you for referring your patient, Lashonda Rivera, to the [facility] [place]. Please see a copy of my visit note below.      [facility]  SPORTS MEDICINE CLINIC VISIT     Oct 27, 2021        Ultrasound Guided Right ECU tendon injection    Date/Time: 10/27/2021 1:46 PM  Performed by: Tyrese Ortiz MD  Authorized by: Tyrese Ortiz MD     Indications:  Pain and diagnostic  Needle Size:  25 G  Guidance: ultrasound    Approach:  Ulnar   Condition comment:  ECU tendon     Medications:  6 mg betamethasone acet & sod phos 6 (3-3) MG/ML  Outcome:  Tolerated well, no immediate complications  Procedure discussed: discussed risks, benefits, and alternatives    Consent Given by:  Patient  Timeout: timeout called immediately prior to procedure    Prep: patient was prepped and draped in usual sterile fashion     PROCEDURE: Ultrasound Guided ECU Tendon Injection   The patient was apprised of the risks and the benefits of the procedure written consent was signed by the patient.   The patient was positioned with hand pronated on exam table.  The ECU tendon was identified with ultrasound and marked with a pen.  This area was then cleaned with a chlorhexidine swab.  Anesthesia of the skin was obtained with ethyl chloride spray.  Next using direct and continuous ultrasound guidance with sterile technique a 25-gauge needle was introduced under the tendon sheath and a solution of 6 mg celestone and 1 mL 1% lidocaine was injected and seen flowing around the tendon.  Images were captured and saved to the permanent record.  The patient tolerated the procedure well and there were no immediate complications.  Patient was instructed to ice the wrist upon leaving the clinic and refrain from overuse for the next 2 days.  Follow-up promptly for any increase in  pain, swelling, redness or warmth from the injection site.  Otherwise routine postinjection instructions were given.  Tyrese Ortiz MD                Again, thank you for allowing me to participate in the care of your patient.        Sincerely,        Tyrese Ortiz MD

## 2021-10-27 NOTE — PROGRESS NOTES
[facility]  SPORTS MEDICINE CLINIC VISIT     Oct 27, 2021        Ultrasound Guided Right ECU tendon injection    Date/Time: 10/27/2021 1:46 PM  Performed by: Tyrese Ortiz MD  Authorized by: Tyrese Ortiz MD     Indications:  Pain and diagnostic  Needle Size:  25 G  Guidance: ultrasound    Approach:  Ulnar   Condition comment:  ECU tendon     Medications:  6 mg betamethasone acet & sod phos 6 (3-3) MG/ML  Outcome:  Tolerated well, no immediate complications  Procedure discussed: discussed risks, benefits, and alternatives    Consent Given by:  Patient  Timeout: timeout called immediately prior to procedure    Prep: patient was prepped and draped in usual sterile fashion     PROCEDURE: Ultrasound Guided ECU Tendon Injection   The patient was apprised of the risks and the benefits of the procedure written consent was signed by the patient.   The patient was positioned with hand pronated on exam table.  The ECU tendon was identified with ultrasound and marked with a pen.  This area was then cleaned with a chlorhexidine swab.  Anesthesia of the skin was obtained with ethyl chloride spray.  Next using direct and continuous ultrasound guidance with sterile technique a 25-gauge needle was introduced under the tendon sheath and a solution of 6 mg celestone and 1 mL 1% lidocaine was injected and seen flowing around the tendon.  Images were captured and saved to the permanent record.  The patient tolerated the procedure well and there were no immediate complications.  Patient was instructed to ice the wrist upon leaving the clinic and refrain from overuse for the next 2 days.  Follow-up promptly for any increase in pain, swelling, redness or warmth from the injection site.  Otherwise routine postinjection instructions were given.  Tyrese Ortiz MD

## 2021-11-03 ENCOUNTER — OFFICE VISIT (OUTPATIENT)
Dept: URGENT CARE | Facility: URGENT CARE | Age: 51
End: 2021-11-03
Payer: COMMERCIAL

## 2021-11-03 VITALS
WEIGHT: 142.2 LBS | BODY MASS INDEX: 25.39 KG/M2 | HEART RATE: 87 BPM | SYSTOLIC BLOOD PRESSURE: 107 MMHG | DIASTOLIC BLOOD PRESSURE: 71 MMHG | OXYGEN SATURATION: 98 % | TEMPERATURE: 97.3 F

## 2021-11-03 DIAGNOSIS — M79.662 PAIN OF LEFT LOWER LEG: Primary | ICD-10-CM

## 2021-11-03 DIAGNOSIS — S86.812A STRAIN OF CALF MUSCLE, LEFT, INITIAL ENCOUNTER: ICD-10-CM

## 2021-11-03 LAB — D DIMER PPP FEU-MCNC: <0.27 UG/ML FEU (ref 0–0.5)

## 2021-11-03 PROCEDURE — 36415 COLL VENOUS BLD VENIPUNCTURE: CPT | Performed by: PREVENTIVE MEDICINE

## 2021-11-03 PROCEDURE — 85379 FIBRIN DEGRADATION QUANT: CPT | Performed by: PREVENTIVE MEDICINE

## 2021-11-03 PROCEDURE — 99214 OFFICE O/P EST MOD 30 MIN: CPT | Performed by: PREVENTIVE MEDICINE

## 2021-11-03 RX ORDER — NAPROXEN 500 MG/1
500 TABLET ORAL 2 TIMES DAILY WITH MEALS
Qty: 20 TABLET | Refills: 0 | Status: SHIPPED | OUTPATIENT
Start: 2021-11-03 | End: 2021-11-04

## 2021-11-03 NOTE — PATIENT INSTRUCTIONS
Most consistent with a left lower leg muscle strain  Advise heat and ice to area of pain  Naprosyn 500 mg two times per day for 10 days    Will also do a ddimer blood test to evaluate for a blood clot.    Follow up in clinic if not improving over the next 2 weeks.

## 2021-11-04 ENCOUNTER — ANCILLARY PROCEDURE (OUTPATIENT)
Dept: GENERAL RADIOLOGY | Facility: CLINIC | Age: 51
End: 2021-11-04
Attending: OBSTETRICS & GYNECOLOGY
Payer: COMMERCIAL

## 2021-11-04 ENCOUNTER — OFFICE VISIT (OUTPATIENT)
Dept: OBGYN | Facility: CLINIC | Age: 51
End: 2021-11-04
Payer: COMMERCIAL

## 2021-11-04 VITALS
DIASTOLIC BLOOD PRESSURE: 84 MMHG | WEIGHT: 142 LBS | SYSTOLIC BLOOD PRESSURE: 127 MMHG | BODY MASS INDEX: 25.36 KG/M2 | OXYGEN SATURATION: 99 % | HEART RATE: 91 BPM

## 2021-11-04 DIAGNOSIS — R14.0 ABDOMINAL BLOATING: ICD-10-CM

## 2021-11-04 DIAGNOSIS — K62.89 RECTAL PAIN: Primary | ICD-10-CM

## 2021-11-04 DIAGNOSIS — K59.00 CONSTIPATION, UNSPECIFIED CONSTIPATION TYPE: ICD-10-CM

## 2021-11-04 DIAGNOSIS — K62.89 RECTAL PAIN: ICD-10-CM

## 2021-11-04 PROCEDURE — 99213 OFFICE O/P EST LOW 20 MIN: CPT | Performed by: OBSTETRICS & GYNECOLOGY

## 2021-11-04 PROCEDURE — 74018 RADEX ABDOMEN 1 VIEW: CPT | Performed by: RADIOLOGY

## 2021-11-04 RX ORDER — BETAMETHASONE SODIUM PHOSPHATE AND BETAMETHASONE ACETATE 3; 3 MG/ML; MG/ML
6 INJECTION, SUSPENSION INTRA-ARTICULAR; INTRALESIONAL; INTRAMUSCULAR; SOFT TISSUE
Status: DISCONTINUED | OUTPATIENT
Start: 2021-10-27 | End: 2022-12-19

## 2021-11-04 NOTE — PATIENT INSTRUCTIONS
If you have any questions regarding your visit, Please contact your care team.     [facility]: [phone]  Women s Health CLINIC HOURS TELEPHONE NUMBER       Pardeep Marcus M.D.    Lesa-EPI Gooden-EPI Xavier-Medical Assistant    Debra-  [place]ok-     Monday-[place]  8:00a.m-4:45 p.m  Tuesday-[place]  9:00a.m-4:00 p.m  Wednesday-[place] 8:00a.m-4:45 p.m.  Thursday-[place]  8:00a.m-4:45 p.m.  Friday-[place]  8:00a.m-4:45 p.m. [facility]  [address] [facility] [place] [address] [phone] ask for [facility]  [phone] Fax  Imaging [phone]    [facility]  [address]  [place], [place] [address] [phone]    [facility]  [address] [place]  [place] [address] [phone] ask [facility]  [phone] Fax  Imaging [phone]     Urgent Care locations:    [place]        [place] Monday-Friday  10 am - 8 pm  Saturday and Sunday   9 am - 5 pm  Monday-Friday   10 am - 8 pm  Saturday and Sunday   9 am - 5 pm   [phone]   If you need a medication refill, please contact your pharmacy. Please allow 3 business days for your refill to be completed.  As always, Thank you for trusting us with your healthcare needs!

## 2021-11-04 NOTE — PROGRESS NOTES
Assessment & Plan   1. Pain of left lower leg  - D dimer, quantitative  D dimer normal    Most consistent with a left lower leg muscle strain  Advise heat and ice to area of pain  Naprosyn 500 mg two times per day for 10 days    Follow up in clinic if not improving over the next 2 weeks.     31 minutes spent on the date of the encounter doing chart review, review of test results, interpretation of tests, patient visit and documentation         No follow-ups on file.    Andrey Mcmanus MD  [facility] URGENT CARE    Subjective     Lashonda Rivera is a 51 year old year old female who presents to clinic today for the following health issues:    Patient presents with:  Leg Pain: Pt's left leg has been cramping lately, this morning pt experienced severe pain on the back of her left calf.     This is a 50 yo female who developed pain in the back of her left calf yesterday.  She had no trauma and no inciting event.  She states it hurts when she is flexing her foot.    Always hurting, worse when weight bearing.    Patient Active Problem List   Diagnosis     CARDIOVASCULAR SCREENING; LDL GOAL LESS THAN 160     Tension headache     Migraine headache     Adjustment disorder with depressed mood     Seasonal allergic rhinitis, unspecified allergic rhinitis trigger     TBI (traumatic brain injury), without LOC, sequela     Adnexal cyst     Injury of triangular fibrocartilage complex (TFCC) of right wrist     Tendinitis     Unspecified intracranial injury with loss of consciousness of unspecified duration, initial encounter (H)     Low back pain     Lumbago with sciatica, left side       Current Outpatient Medications   Medication     albuterol (PROAIR HFA/PROVENTIL HFA/VENTOLIN HFA) 108 (90 Base) MCG/ACT inhaler     fluticasone (FLONASE) 50 MCG/ACT nasal spray     metoclopramide (REGLAN) 10 MG tablet     Multiple Vitamins-Minerals (HAIR SKIN NAILS) CAPS     naproxen (NAPROSYN) 500 MG tablet      polyethylene glycol (MIRALAX) 17 GM/Dose powder     Probiotic Product (PROBIOTIC DAILY PO)     SUMAtriptan (IMITREX) 100 MG tablet     tiZANidine (ZANAFLEX) 4 MG tablet     gabapentin (NEURONTIN) 100 MG capsule     No current facility-administered medications for this visit.       Past Medical History:   Diagnosis Date     Adjustment disorder with depressed mood 1/19/2016     Breast fibroadenoma, right 2016    excised     Complication of anesthesia     nausea/ use scop. patches     H/O: hysterectomy 2013     Hypothyroidism 2005    Now resolved     Leiomyoma of uterus, unspecified 2013    resolved     Menorrhagia 2000    resolved     Migraine headache 10/2/2012     Recurrent UTI 2012     TBI (traumatic brain injury), without LOC, sequela 9/12/2017     Vitamin B 12 deficiency 2005    Resolved       Social History   reports that she has never smoked. She has never used smokeless tobacco. She reports that she does not drink alcohol and does not use drugs.    Family History   Problem Relation Age of Onset     Diabetes Mother      Diabetes Maternal Grandmother      Hypertension Maternal Grandmother      Arthritis Maternal Grandmother      Cancer Maternal Grandmother         bladder cancer/cervix     Cancer Maternal Grandfather         bone     Asthma Son      Unknown/Adopted Paternal Grandfather      Unknown/Adopted Paternal Grandmother      Cervical Cancer Maternal Aunt        Review of Systems  Constitutional, HEENT, cardiovascular, pulmonary, GI, , musculoskeletal, neuro, skin, endocrine and psych systems are negative, except as otherwise noted.      Objective    /71 (BP Location: Left arm, Patient Position: Sitting, Cuff Size: Adult Regular)   Pulse 87   Temp 97.3  F (36.3  C) (Tympanic)   Wt 64.5 kg (142 lb 3.2 oz)   LMP 06/10/2013 (Exact Date)   SpO2 98%   BMI 25.39 kg/m    Physical Exam   GENERAL: healthy, alert and no distress  EYES: Eyes grossly normal to inspection, PERRL and conjunctivae and  sclerae normal  HENT: ear canals and TM's normal, nose and mouth without ulcers or lesions  NECK: no adenopathy, no asymmetry, masses, or scars and thyroid normal to palpation  RESP: lungs clear to auscultation - no rales, rhonchi or wheezes  CV: regular rate and rhythm, normal S1 S2, no S3 or S4, no murmur, click or rub, no peripheral edema and peripheral pulses strong  ABDOMEN: soft, nontender, no hepatosplenomegaly, no masses and bowel sounds normal  MS: no gross musculoskeletal defects noted, no edema  SKIN: no suspicious lesions or rashes  NEURO: Normal strength and tone, mentation intact and speech normal  PSYCH: mentation appears normal, affect normal/bright    Results for orders placed or performed in visit on 11/03/21 (from the past 24 hour(s))   D dimer, quantitative   Result Value Ref Range    D-Dimer Quantitative <0.27 0.00 - 0.50 ug/mL FEU    Narrative    This D-dimer assay is intended for use in conjunction with a clinical pretest probability assessment model to exclude pulmonary embolism (PE) and deep venous thrombosis (DVT) in outpatients suspected of PE or DVT. The cut-off value is 0.50 ug/mL FEU.

## 2021-11-04 NOTE — PROGRESS NOTES
OB-GYN Problem-Oriented Visit or Consultation      Lashonda Rivera is a 51 year old year old P 2 who presents with a chief complaint of rectal pain.  Referred by self.  Patient's last menstrual period was 06/10/2013 (exact date).    Assessment:   Encounter Diagnoses   Name Primary?     Rectal pain Yes     Abdominal bloating          Plan and Recommendations: Recheck abd Xray. If extensive stool burden remains, may try enemas and follow-up GI evaluation. Also consider recheck pelvic ultrasound for non-specific pelvic cystic region noted in past years. Reinforced other measures to control constipation and gas.   I reviewed the condition, causes, differential diagnosis, prognosis, evaluation and management considerations and options.  Questions answered and information given. See orders.   20 minutes spent on the date of encounter doing chart review, history, discussion with patient, and documentation, and further activities as noted, and review of appropriateness of decision-making for care, and review of test results, and interpretation of test results.     A/P:  Lashonda was seen today for consult.    Diagnoses and all orders for this visit:    Rectal pain  -     XR Abdomen 1 View; Future    Abdominal bloating  -     XR Abdomen 1 View; Future        Pardeep Marcus MD    HPI:     See prior notes. Episodic rectal pain has decreased since evaluation with history as outlined by Sommer Seo NP. Has increased water intake and used Miralax, Metamucil, and GasEx with some results. Some intermittent bloating but decreased. Desires follow-up Xray to check. Found out her estranged father has some colon problems too. Taking naproxen for wrist pain.     Past medical, obstetrical, surgical, family and social history reviewed and as noted or updated in chart.     Allergies, meds and supplements are as noted or updated in chart.      ROS:   Systems reviewed include:  constitutional, gastrointestinal, genitourinary, psychological,  hematologic/lymphatic and endocrine.    These systems were negative for significant symptoms except for the following additional: none; see HPI.    EXAM:  VS as noted. /84 (BP Location: Left arm, Patient Position: Chair, Cuff Size: Adult Regular)   Pulse 91   Wt 64.4 kg (142 lb)   LMP 06/10/2013 (Exact Date)   SpO2 99%   Breastfeeding No   BMI 25.36 kg/m    Constitutionally normal.     Exam not repeated at this time.    Pardeep Marcus MD

## 2021-11-11 ENCOUNTER — THERAPY VISIT (OUTPATIENT)
Dept: OCCUPATIONAL THERAPY | Facility: CLINIC | Age: 51
End: 2021-11-11
Payer: COMMERCIAL

## 2021-11-11 DIAGNOSIS — M77.9 TENDINITIS: ICD-10-CM

## 2021-11-11 DIAGNOSIS — S69.81XD INJURY OF TRIANGULAR FIBROCARTILAGE COMPLEX (TFCC) OF RIGHT WRIST, SUBSEQUENT ENCOUNTER: ICD-10-CM

## 2021-11-11 PROCEDURE — 97763 ORTHC/PROSTC MGMT SBSQ ENC: CPT | Mod: GO | Performed by: OCCUPATIONAL THERAPIST

## 2021-11-16 ENCOUNTER — MYC MEDICAL ADVICE (OUTPATIENT)
Dept: OBGYN | Facility: CLINIC | Age: 51
End: 2021-11-16
Payer: COMMERCIAL

## 2021-11-16 NOTE — TELEPHONE ENCOUNTER
I think that continuing with the enemas and Miralax as needed should be fine but follow-up with FP/IM in the meantime as needed and later with GI as planned. Please notify.   Pardeep Marcus MD

## 2021-11-16 NOTE — TELEPHONE ENCOUNTER
Pt last seen 11/4/2021 for rectal pain, referred to GI but cannot get in until May 2022. RN advised to f/u with PCP for further management.    RN routing to provider for any additional recommendations.    Lesa Spivey RN on 11/16/2021 at 9:41 AM

## 2021-12-07 ENCOUNTER — MYC MEDICAL ADVICE (OUTPATIENT)
Dept: FAMILY MEDICINE | Facility: CLINIC | Age: 51
End: 2021-12-07
Payer: COMMERCIAL

## 2021-12-13 ENCOUNTER — MYC MEDICAL ADVICE (OUTPATIENT)
Dept: FAMILY MEDICINE | Facility: CLINIC | Age: 51
End: 2021-12-13
Payer: COMMERCIAL

## 2021-12-13 NOTE — TELEPHONE ENCOUNTER
Routing to the team coordinators to help make a virtual appointment for this patient, please give patient a call. Thank you!    Cady Vick RN BSN

## 2021-12-13 NOTE — TELEPHONE ENCOUNTER
Patient is scheduled for a telephone visit for 12/14/2021.  Daniela Vera [facility]  2nd Floor  Primary Care

## 2021-12-14 ENCOUNTER — VIRTUAL VISIT (OUTPATIENT)
Dept: FAMILY MEDICINE | Facility: CLINIC | Age: 51
End: 2021-12-14
Payer: COMMERCIAL

## 2021-12-14 DIAGNOSIS — M51.16 LUMBAR DISC HERNIATION WITH RADICULOPATHY: Primary | ICD-10-CM

## 2021-12-14 DIAGNOSIS — Z01.812 ENCOUNTER FOR SCREENING LABORATORY TESTING FOR COVID-19 VIRUS IN ASYMPTOMATIC PATIENT: ICD-10-CM

## 2021-12-14 DIAGNOSIS — Z11.52 ENCOUNTER FOR SCREENING LABORATORY TESTING FOR COVID-19 VIRUS IN ASYMPTOMATIC PATIENT: ICD-10-CM

## 2021-12-14 PROCEDURE — 99213 OFFICE O/P EST LOW 20 MIN: CPT | Mod: 95 | Performed by: PREVENTIVE MEDICINE

## 2021-12-14 NOTE — PROGRESS NOTES
Lashonda is a 51 year old who is being evaluated via a billable telephone visit.      What phone number would you like to be contacted at? Home number   How would you like to obtain your AVS? MyChart    Assessment & Plan     Lumbar disc herniation with radiculopathy  -symptoms similar to when last injection was done  - XR Lumbar Epidural Injection Incl Imaging  -MRI done 8/2020   -The patient does not have any focal weakness or numbness, no urine or stool incontinence, no hematuria, no saddle anesthesia and no gait abnormalities.       Encounter for screening laboratory testing for COVID-19 virus in asymptomatic patient  -will place orders once scheduled, unable to place orders without date of procedure       Ordering of each unique test  20 minutes spent on the date of the encounter doing chart review, history and exam, documentation and further activities per the note       Return in about 2 weeks (around 12/28/2021) if symptoms worsen or fail to improve.    Asmita Bhatia MD MPH    [facility]    Geraldine Gallego is a 51 year old who presents for the following health issues:        HPI     Low back pain:    MRI Lumbar spine done 8/28/2020  Had LESI 10/16/2020   Numbness in left leg, injection helped quite a bit, would like to get another injection   Foot is swelling and becoming numb  The leg pain was better after the injection  Has been keeping foot elevated  Biofreeze+ being used  Over the counter used (Tiger balm)   No redness  No fever  No rash      Review of Systems   Constitutional, HEENT, cardiovascular, pulmonary, gi and gu systems are negative, except as otherwise noted.      Objective           Vitals:  No vitals were obtained today due to virtual visit.    Physical Exam   healthy, alert and no distress  PSYCH: Alert and oriented times 3; coherent speech, normal   rate and volume, able to articulate logical thoughts, able   to abstract reason, no tangential thoughts, no  hallucinations   or delusions  Her affect is normal  RESP: No cough, no audible wheezing, able to talk in full sentences  Remainder of exam unable to be completed due to telephone visits          Phone call duration: 5 minutes

## 2021-12-17 ENCOUNTER — ANCILLARY PROCEDURE (OUTPATIENT)
Dept: GENERAL RADIOLOGY | Facility: CLINIC | Age: 51
End: 2021-12-17
Attending: PREVENTIVE MEDICINE
Payer: COMMERCIAL

## 2021-12-17 DIAGNOSIS — M51.16 LUMBAR DISC HERNIATION WITH RADICULOPATHY: ICD-10-CM

## 2021-12-17 PROCEDURE — 62323 NJX INTERLAMINAR LMBR/SAC: CPT | Performed by: RADIOLOGY

## 2021-12-17 RX ORDER — METHYLPREDNISOLONE ACETATE 80 MG/ML
80 INJECTION, SUSPENSION INTRA-ARTICULAR; INTRALESIONAL; INTRAMUSCULAR; SOFT TISSUE ONCE
Status: COMPLETED | OUTPATIENT
Start: 2021-12-17 | End: 2021-12-17

## 2021-12-17 RX ORDER — BUPIVACAINE HYDROCHLORIDE 5 MG/ML
2 INJECTION, SOLUTION PERINEURAL ONCE
Status: COMPLETED | OUTPATIENT
Start: 2021-12-17 | End: 2021-12-17

## 2021-12-17 RX ORDER — LIDOCAINE HYDROCHLORIDE 10 MG/ML
5 INJECTION, SOLUTION EPIDURAL; INFILTRATION; INTRACAUDAL; PERINEURAL ONCE
Status: COMPLETED | OUTPATIENT
Start: 2021-12-17 | End: 2021-12-17

## 2021-12-17 RX ORDER — IOPAMIDOL 408 MG/ML
2 INJECTION, SOLUTION INTRATHECAL ONCE
Status: COMPLETED | OUTPATIENT
Start: 2021-12-17 | End: 2021-12-17

## 2021-12-17 RX ADMIN — IOPAMIDOL 2 ML: 408 INJECTION, SOLUTION INTRATHECAL at 11:07

## 2021-12-17 RX ADMIN — BUPIVACAINE HYDROCHLORIDE 10 MG: 5 INJECTION, SOLUTION PERINEURAL at 11:06

## 2021-12-17 RX ADMIN — LIDOCAINE HYDROCHLORIDE 5 ML: 10 INJECTION, SOLUTION EPIDURAL; INFILTRATION; INTRACAUDAL; PERINEURAL at 11:07

## 2021-12-17 RX ADMIN — METHYLPREDNISOLONE ACETATE 80 MG: 80 INJECTION, SUSPENSION INTRA-ARTICULAR; INTRALESIONAL; INTRAMUSCULAR; SOFT TISSUE at 11:06

## 2021-12-17 NOTE — PROGRESS NOTES
Lashonda was seen in X-ray today for a Lumbar epidural injection. Patient rated pain before procedure 6/10. After procedure patient rated pain 4/10.   This pain level is acceptable to patient. Patient discharged home with .

## 2021-12-17 NOTE — DISCHARGE SUMMARY
Radiology Discharge Instructions Post Lumbar Puncture  AFTER YOU GO HOME  ? DO relax and take it easy for 24 hours; we suggest bed rest until the next morning, except for getting up to the bathroom  ? You may resume normal activity tomorrow  ? You may remove the bandage on your back in the evening or next morning  ? You may resume bathing the next day  ? Drink at least 4 glasses of extra fluid today if not on a fluid restriction.  ? DO NOT drive or operate machinery at home or at work for at least 24 hours.    CALL YOUR PRIMARY PHYSICIAN IF:  ? If you do start to leak a large amount of fluid from the puncture site, lie down flat on your back. Call your primary physician they will tell you if you need to return to the hospital.  ? You develop severe headache  ? You develop nausea or vomiting.  ? You develop a temperature of 101 degrees F or greater.    ADDITIONAL INSTRUCTIONS:   ? If you are taking a blood thinner, you may resume your medication at your regular dose today.  Follow up with your physician to have your INR rechecked if indicated.  ? You may use over the counter pain reliever, do not use aspirin for 24 hours.    Contacts:  During business hours from 8 to 5 pm, you may call [phone] to reach a nurse advisor.  After hours call [facility] [phone].  Ask for the Radiologist on call.  Someone is on call 24 hrs/day.  [facility] Toll Free Number   [phone]

## 2021-12-27 ENCOUNTER — IMMUNIZATION (OUTPATIENT)
Dept: NURSING | Facility: CLINIC | Age: 51
End: 2021-12-27
Payer: COMMERCIAL

## 2021-12-27 PROCEDURE — 0064A COVID-19,PF,MODERNA (18+ YRS BOOSTER .25ML): CPT

## 2021-12-27 PROCEDURE — 91306 COVID-19,PF,MODERNA (18+ YRS BOOSTER .25ML): CPT

## 2021-12-28 ENCOUNTER — MYC MEDICAL ADVICE (OUTPATIENT)
Dept: FAMILY MEDICINE | Facility: CLINIC | Age: 51
End: 2021-12-28
Payer: COMMERCIAL

## 2021-12-30 NOTE — PROGRESS NOTES
"  [facility] Internal Medicine Progress Note           Assessment and Plan:     Costochondritis  - XR Sternum 2 Views    Chest wall pain  - XR Ribs & Chest Right G/E 3 Views  - CT Chest w/o Contrast; Future  - indomethacin (INDOCIN) 25 MG capsule; Take 2 capsules (50 mg) by mouth 3 times daily (with meals) for 3 days           Interval History:     Lashonda Rivera is a 51 year old female who presents to clinic today for the following health issues accompanied by her       Pain History:  When did you first notice your pain? - Less than 1 week   Have you seen anyone else for your pain? Yes -   Where in your body do you have pain? Chest wall, right side (above the right breast, upper inner quadrant).  Intensity: moderate  Progression of Symptoms: same  Accompanying Signs & Symptoms:  Shortness of breath: YES  Sweating: no  Nausea/vomiting: no  Lightheadedness: no  Palpitations: YES  Fever/Chills: no  Cough: no           Heartburn: YES  History:   Family history of heart disease: no  Tobacco use: no  Previous similar symptoms: no   Precipitating factors: lying supine  Worse with exertion: no  Worse with deep breaths: no           Related to eating: no           Better with burping: no  Alleviating factors: none  Therapies tried and outcome: Tried Albuterol inhaler but it's not effective. Stopped current exercise regimen, \"Walk Away the Pounds.\" Denies any chest trauma. Denies any strenuous exercises such as push-ups. No history of chronic lung disease, malignancy nor venous thromboembolism.                Significant Problems:   Patient Active Problem List   Diagnosis     Tension headache     Migraine headache     Adjustment disorder with depressed mood     Seasonal allergic rhinitis, unspecified allergic rhinitis trigger     TBI (traumatic brain injury), without LOC, sequela     Adnexal cyst     Unspecified intracranial injury with loss of consciousness of unspecified duration, initial encounter (H)     " Low back pain     Lumbago with sciatica, left side     Hyperlipidemia LDL goal <70              Review of Systems:   CONSTITUTIONAL: NEGATIVE for fever, chills, change in weight  INTEGUMENTARY/SKIN: NEGATIVE for worrisome rashes, moles or lesions  EYES: NEGATIVE for vision changes or irritation  ENT/MOUTH: NEGATIVE for ear, mouth and throat problems  RESP: NEGATIVE for significant cough or SOB  BREAST: NEGATIVE for masses, tenderness or discharge  CV: NEGATIVE for cyanosis, diaphoresis, dyspnea on exertion, lower extremity edema, orthopnea, palpitations and paroxysmal nocturnal dyspnea  GI: NEGATIVE for nausea, abdominal pain, heartburn, or change in bowel habits  : NEGATIVE for frequency, dysuria, or hematuria  MUSCULOSKELETAL:As above.  NEURO: NEGATIVE for weakness, dizziness or paresthesias  ENDOCRINE: NEGATIVE for temperature intolerance, skin/hair changes  HEME: NEGATIVE for bleeding problems  PSYCHIATRIC: NEGATIVE for changes in mood or affect             Physical Exam:   /75 (BP Location: Left arm, Patient Position: Chair, Cuff Size: Adult Regular)   Pulse 107   Temp 97.6  F (36.4  C) (Tympanic)   Wt 80.2 kg (176 lb 12.8 oz)   LMP 06/10/2013 (Exact Date)   SpO2 99%   Breastfeeding No   BMI 31.57 kg/m    Constitutional: Awake, alert, cooperative, no apparent distress, and appears stated age.  Eyes: extra-ocular muscles intact and sclera clear  ENT: normocepalic, without obvious abnormality  Lungs: No increased work of breathing, good air exchange, clear to auscultation bilaterally, no crackles or wheezing.  Cardiovascular: Regular rate and rhythm, normal S1 and S2, no S3 or S4, and no murmur noted.  Musculoskeletal: Tenderness on palpation of right costochondral border.  Neurologic: Mental Status Exam:  Level of Alertness:   awake  Orientation:   person, place, time  Memory:   normal  Fund of Knowledge:  normal  Attention/Concentration:  normal  Language:  normal  Neuropsychiatric: Normal  affect, mood, orientation, memory and insight.  Skin: No rashes, erythema, pallor, petechia or purpura.          Data:   EXAMINATION: CT CHEST W/O CONTRAST, 12/31/2021 3:41 PM     TECHNIQUE:  Helical CT images from the thoracic inlet through the  upper abdomen were obtained without intravenous contrast.  Images are  displayed at 1 and 5 mm intervals. Images reviewed in lung, soft  tissue, and bone windows.     Radiation Dose (DLP): 83 mGy*cm     COMPARISON: Chest x-ray 12/31/2021     HISTORY: Chest wall pain; Chest wall pain     FINDINGS:     Lungs: The central tracheobronchial tree is patent. No pleural  effusion, pneumothorax, or focal consolidation. Calcified granuloma in  the left upper lobe. No suspicious pulmonary nodules.     Mediastinum: The visualized thyroid gland is unremarkable. Common  origin of the brachiocephalic and left common carotid artery, normal  variant. Mild atherosclerotic calcification of the aortic arch. The  heart is not enlarged. No significant pericardial effusion. The  ascending aorta and main pulmonary artery are normal in caliber. No  mediastinal or axillary lymphadenopathy.     Upper abdomen: Unremarkable.     Bones/soft tissues: Mild degenerative changes of the spine. No acute  or suspicious osseous abnormality. No rib fracture.                                                                      IMPRESSION: No acute pathology of the chest.     I have personally reviewed the examination and initial interpretation  and I agree with the findings.     SG CLOUD MD    STERNUM TWO VIEWS     12/31/2021 12:00 PM      HISTORY: Costochondritis.     COMPARISON: None.                                                                      IMPRESSION: No evidence of acute fracture. Moderate costochondral  calcifications.      JAMIE MENDOZA MD     RIGHT RIBS WITH CHEST THREE VIEWS     12/31/2021 12:00 PM      HISTORY: Right chest wall pain.     COMPARISON: None.                                                                       IMPRESSION: Chest x-ray is unremarkable. No evidence of rib fracture.      JAMIE MENDOZA MD    Disposition:  Follow-up in 4 weeks.      Hussain Guerra MD  Internal Medicine  [facility] Team

## 2021-12-31 ENCOUNTER — MYC MEDICAL ADVICE (OUTPATIENT)
Dept: FAMILY MEDICINE | Facility: CLINIC | Age: 51
End: 2021-12-31

## 2021-12-31 ENCOUNTER — ANCILLARY PROCEDURE (OUTPATIENT)
Dept: CT IMAGING | Facility: CLINIC | Age: 51
End: 2021-12-31
Attending: INTERNAL MEDICINE
Payer: COMMERCIAL

## 2021-12-31 ENCOUNTER — ANCILLARY PROCEDURE (OUTPATIENT)
Dept: GENERAL RADIOLOGY | Facility: CLINIC | Age: 51
End: 2021-12-31
Attending: INTERNAL MEDICINE
Payer: COMMERCIAL

## 2021-12-31 ENCOUNTER — OFFICE VISIT (OUTPATIENT)
Dept: FAMILY MEDICINE | Facility: CLINIC | Age: 51
End: 2021-12-31
Payer: COMMERCIAL

## 2021-12-31 VITALS
DIASTOLIC BLOOD PRESSURE: 75 MMHG | HEART RATE: 107 BPM | WEIGHT: 176.8 LBS | TEMPERATURE: 97.6 F | SYSTOLIC BLOOD PRESSURE: 120 MMHG | OXYGEN SATURATION: 99 % | BODY MASS INDEX: 31.57 KG/M2

## 2021-12-31 DIAGNOSIS — R07.89 CHEST WALL PAIN: ICD-10-CM

## 2021-12-31 DIAGNOSIS — M94.0 COSTOCHONDRITIS: Primary | ICD-10-CM

## 2021-12-31 PROCEDURE — 99214 OFFICE O/P EST MOD 30 MIN: CPT | Performed by: INTERNAL MEDICINE

## 2021-12-31 PROCEDURE — 71250 CT THORAX DX C-: CPT | Mod: GC | Performed by: RADIOLOGY

## 2021-12-31 PROCEDURE — 71101 X-RAY EXAM UNILAT RIBS/CHEST: CPT | Mod: RT | Performed by: RADIOLOGY

## 2021-12-31 PROCEDURE — 71120 X-RAY EXAM BREASTBONE 2/>VWS: CPT | Performed by: RADIOLOGY

## 2021-12-31 RX ORDER — INDOMETHACIN 25 MG/1
50 CAPSULE ORAL
Qty: 18 CAPSULE | Refills: 2 | Status: SHIPPED | OUTPATIENT
Start: 2021-12-31 | End: 2022-08-11

## 2021-12-31 ASSESSMENT — PAIN SCALES - GENERAL: PAINLEVEL: MODERATE PAIN (4)

## 2022-01-05 ENCOUNTER — MYC MEDICAL ADVICE (OUTPATIENT)
Dept: FAMILY MEDICINE | Facility: CLINIC | Age: 52
End: 2022-01-05
Payer: COMMERCIAL

## 2022-01-05 DIAGNOSIS — J20.8 ACUTE BRONCHITIS DUE TO OTHER SPECIFIED ORGANISMS: Primary | ICD-10-CM

## 2022-01-10 ENCOUNTER — MYC MEDICAL ADVICE (OUTPATIENT)
Dept: FAMILY MEDICINE | Facility: CLINIC | Age: 52
End: 2022-01-10
Payer: COMMERCIAL

## 2022-01-10 DIAGNOSIS — E78.5 HYPERLIPIDEMIA LDL GOAL <70: ICD-10-CM

## 2022-01-10 DIAGNOSIS — Z13.6 CARDIOVASCULAR SCREENING; LDL GOAL LESS THAN 100: Primary | ICD-10-CM

## 2022-01-11 NOTE — TELEPHONE ENCOUNTER
Routing to Provider to review and advise.     Refer to [employer] message from patient.     Cady Vick RN BSN

## 2022-01-13 PROBLEM — M77.9 TENDINITIS: Status: RESOLVED | Noted: 2021-08-30 | Resolved: 2022-01-13

## 2022-01-13 PROBLEM — S69.81XA INJURY OF TRIANGULAR FIBROCARTILAGE COMPLEX (TFCC) OF RIGHT WRIST: Status: RESOLVED | Noted: 2021-08-30 | Resolved: 2022-01-13

## 2022-01-13 RX ORDER — AZITHROMYCIN 250 MG/1
TABLET, FILM COATED ORAL
Qty: 6 TABLET | Refills: 1 | Status: SHIPPED | OUTPATIENT
Start: 2022-01-13 | End: 2022-08-11

## 2022-01-18 ENCOUNTER — LAB (OUTPATIENT)
Dept: LAB | Facility: CLINIC | Age: 52
End: 2022-01-18
Payer: COMMERCIAL

## 2022-01-18 DIAGNOSIS — Z13.6 CARDIOVASCULAR SCREENING; LDL GOAL LESS THAN 100: ICD-10-CM

## 2022-01-18 PROBLEM — E78.5 HYPERLIPIDEMIA LDL GOAL <70: Status: ACTIVE | Noted: 2022-01-18

## 2022-01-18 LAB
CHOLEST SERPL-MCNC: 172 MG/DL
FASTING STATUS PATIENT QL REPORTED: YES
HDLC SERPL-MCNC: 54 MG/DL
LDLC SERPL CALC-MCNC: 104 MG/DL
NONHDLC SERPL-MCNC: 118 MG/DL
TRIGL SERPL-MCNC: 71 MG/DL

## 2022-01-18 PROCEDURE — 36415 COLL VENOUS BLD VENIPUNCTURE: CPT

## 2022-01-18 PROCEDURE — 80061 LIPID PANEL: CPT

## 2022-01-18 RX ORDER — ATORVASTATIN CALCIUM 10 MG/1
10 TABLET, FILM COATED ORAL DAILY
Qty: 90 TABLET | Refills: 3 | Status: SHIPPED | OUTPATIENT
Start: 2022-01-18 | End: 2023-01-11

## 2022-01-25 ENCOUNTER — OFFICE VISIT (OUTPATIENT)
Dept: URGENT CARE | Facility: URGENT CARE | Age: 52
End: 2022-01-25
Payer: COMMERCIAL

## 2022-01-25 VITALS
DIASTOLIC BLOOD PRESSURE: 82 MMHG | SYSTOLIC BLOOD PRESSURE: 130 MMHG | BODY MASS INDEX: 26.07 KG/M2 | TEMPERATURE: 98.5 F | WEIGHT: 146 LBS | HEART RATE: 86 BPM | OXYGEN SATURATION: 100 %

## 2022-01-25 DIAGNOSIS — M79.89 CALF SWELLING: ICD-10-CM

## 2022-01-25 DIAGNOSIS — M79.662 PAIN OF LEFT CALF: Primary | ICD-10-CM

## 2022-01-25 PROCEDURE — 99214 OFFICE O/P EST MOD 30 MIN: CPT | Performed by: PHYSICIAN ASSISTANT

## 2022-01-25 NOTE — TELEPHONE ENCOUNTER
REFERRAL INFORMATION:    Referring Provider:  Dr. Pardeep Marcus     Referring Clinic:   [facility]     Reason for Visit/Diagnosis: Constipation      FUTURE VISIT INFORMATION:    Appointment Date: 1/26/2022    Appointment Time: 3 PM      NOTES STATUS DETAILS   OFFICE NOTE from Referring Provider Internal 11/4/2021, 3/21/19 Office visit with Dr. Marcus   OFFICE NOTE from Other Specialist Internal 10/12/2021 Office visit with KENNEDI Espinosa CNP ([facility])    7/9/19 Office visit with Dr. Pablito Garcia ([facility])    4/17/19 Office visit with Dr. Asmita Bhatia ( [facility])      HOSPITAL DISCHARGE SUMMARY/  ED VISITS N/A    OPERATIVE REPORT N/A    MEDICATION LIST Internal         ENDOSCOPY  N/A    COLONOSCOPY Internal 4/4/19   ERCP N/A    EUS N/A    STOOL TESTING N/A    PERTINENT LABS Internal    PATHOLOGY REPORTS (RELATED) N/A    IMAGING (CT, MRI, EGD, MRCP, Small Bowel Follow Through/SBT, MR/CT Enterography) N/A

## 2022-01-26 ENCOUNTER — OFFICE VISIT (OUTPATIENT)
Dept: GASTROENTEROLOGY | Facility: CLINIC | Age: 52
End: 2022-01-26
Attending: OBSTETRICS & GYNECOLOGY
Payer: COMMERCIAL

## 2022-01-26 ENCOUNTER — PRE VISIT (OUTPATIENT)
Dept: GASTROENTEROLOGY | Facility: CLINIC | Age: 52
End: 2022-01-26
Payer: COMMERCIAL

## 2022-01-26 VITALS
BODY MASS INDEX: 26.5 KG/M2 | WEIGHT: 148.4 LBS | DIASTOLIC BLOOD PRESSURE: 74 MMHG | OXYGEN SATURATION: 96 % | HEART RATE: 111 BPM | SYSTOLIC BLOOD PRESSURE: 114 MMHG

## 2022-01-26 DIAGNOSIS — K59.00 CONSTIPATION, UNSPECIFIED CONSTIPATION TYPE: ICD-10-CM

## 2022-01-26 DIAGNOSIS — R14.0 ABDOMINAL BLOATING: ICD-10-CM

## 2022-01-26 DIAGNOSIS — K62.89 RECTAL PAIN: ICD-10-CM

## 2022-01-26 PROCEDURE — 99214 OFFICE O/P EST MOD 30 MIN: CPT | Mod: GC | Performed by: STUDENT IN AN ORGANIZED HEALTH CARE EDUCATION/TRAINING PROGRAM

## 2022-01-26 ASSESSMENT — PAIN SCALES - GENERAL: PAINLEVEL: NO PAIN (0)

## 2022-01-26 NOTE — PATIENT INSTRUCTIONS
Left calf pain and swelling that started today.  However leg felt heavy in her sleep last night.  No injury.  History of low back pain and left sciatica.  Had a epidural  steroid injection for it in November.  She states this feels much different.  No chest pain or shortness of breath.  Tender upper calf.  Diffuse mild redness and subtle swelling felt.  Possible DVT versus abscess versus cellulitis.  Limited on labs and imaging.  To ER for further evaluation and treatment.

## 2022-01-26 NOTE — PROGRESS NOTES
Chief Complaint   Patient presents with     Leg Swelling     left; red and pain             ASSESSMENT:    ICD-10-CM    1. Pain of left calf  M79.662    2. Calf swelling  M79.89            PLAN:Left calf pain and swelling that started today.   However leg felt heavy in her sleep last night.  No injury.  Very tender.  History of low back pain and left sciatica.  Had a epidural  steroid injection for it in November.  She states this feels much different.  No chest pain or shortness of breath.  Tender upper calf.  Diffuse mild redness and subtle swelling felt.  Possible DVT versus abscess versus cellulitis.  Limited on labs and imaging.  To ER for further evaluation and treatment.        Sheila Cordoba PA-C        SUBJECTIVE:  Lashonda Rivera is an 51 year old female who presents with left calf pain and swelling that started today.  However last night while sleeping she states the leg felt heavy.  No chest pain or shortness of breath.  History of left sciatica but this feels much different.  No injury.  No fever.    Past Medical History:   Diagnosis Date     Adjustment disorder with depressed mood 1/19/2016     Breast fibroadenoma, right 2016    excised     Complication of anesthesia     nausea/ use scop. patches     H/O: hysterectomy 2013     Hypothyroidism 2005    Now resolved     Leiomyoma of uterus, unspecified 2013    resolved     Menorrhagia 2000    resolved     Migraine headache 10/2/2012     Recurrent UTI 2012     TBI (traumatic brain injury), without LOC, sequela 9/12/2017     Vitamin B 12 deficiency 2005    Resolved     History   Smoking Status     Never Smoker   Smokeless Tobacco     Never Used       ROS:  GEN no fevers  SKIN as above   Musculoskeletal:  See HPI.      OBJECTIVE:  Last menstrual period 06/10/2013, not currently breastfeeding.   /82   Pulse 86   Temp 98.5  F (36.9  C) (Tympanic)   Wt 66.2 kg (146 lb)   LMP 06/10/2013 (Exact Date)   SpO2 100%   BMI 26.07 kg/m      Patient  is alert and NAD.  EYES: conjunctiva clear  Leg Exam (left):  Inspection/palpation: Left posterior upper calf with diffuse mild redness and subtle swelling.  Very tender.  No pitting edema.    Cap refill intact.    Good doralis pedis.  Neurovascularly Intact Distally.   Negative Homans.      Sheila Cordoba PA-C

## 2022-01-26 NOTE — PROGRESS NOTES
"[facility]:     New Consult: constipation    Date of visit: 1/26/2022    Referring provider: Pardeep Marcus MD    CC: 51 year old female referred by Pardeep Marcus for evaluation of constipation.    ASSESSMENT AND PLAN:    #. Chronic constipation  20+ year issue. No red flag symptoms. Normal C-scope (4/2019). Symptom constellation concerning for IBS-C, chronic idiopathic constipation and possible dyssynergic defecation (symptoms worsened since vaginal births).   -- maintain hydration, soluble fiber rich diet, and encourage exercise as she is able  -- bowel regimen:   -- BID miralax   -- adjunctive metamucil PRN   -- only using electrolyte enemas as absolute  rescue (goal would be to avoid these)  -- pelvic physiotherapy referral  -- patient would like a consult to health psychology, as she has been dealing with a number of psychosocial stressors and would like to process these as it relates to her constipation    #. History of anal fissure  -- can use topical calcium channel blocker PRN  -- warm bath soaks PRN    #. Average risk CRC surveillance  Last C-scope 4/2019. Due 2029.     Return to Clinic: 2 months with video visit    Polo Smith MD  Gastroenterology Fellow  Division of Gastroenterology, Hepatology and Nutrition  [facility]      Patient discussed and seen with Gastroenterology staff Dr. Bruce, who is in agreement with the above.     =======================================  HPI:     Ms. Rivera is a 51 yof seen today for chronic constipation.       Had been having bloating and stomach pains in early 20s, used \"all sorts\" of laxatives, though typically dulcolax. Has never been evaluated for this issue aside from a C-scope in 2019.     Has been treating herself at home with Miralax, metamucil, and fleets enemas since November 2021. She uses perhaps four fleets per week. This is a \"rescue\" when her symptoms are unbearable. . With the above therapies, she has 1-2 BMs per day " "that are \"soft and easy\". When she misses doses of the PEG or soluble fiber her bloating increases significantly and she needs to \"catch up\".     Some does experience some mild straining with stool passage, feels like she cannot totally empty her recum, bright red blood on toilet paper. Very infrequent, every few months or so.     Has a history of anal fissure (mid 2019), treated with nifedipine cream and this has resolved her issues. She feels that this soothes.     GI ROS: some nausea earlier this week though has resolved, unclear precipitant, otherwise ROS negative     Personal GI history: no history of IBD, CRC, or liver disease.    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     Past Medical History:   Diagnosis Date     Adjustment disorder with depressed mood 1/19/2016     Breast fibroadenoma, right 2016    excised     Complication of anesthesia     nausea/ use scop. patches     H/O: hysterectomy 2013     Hypothyroidism 2005    Now resolved     Leiomyoma of uterus, unspecified 2013    resolved     Menorrhagia 2000    resolved     Migraine headache 10/2/2012     Recurrent UTI 2012     TBI (traumatic brain injury), without LOC, sequela 9/12/2017     Vitamin B 12 deficiency 2005    Resolved       Past Surgical History:   Procedure Laterality Date     BIOPSY BREAST Right 6/22/2016    Procedure: BIOPSY BREAST;  Surgeon: Kaiser Roy MD;  Location: [facility]     COLONOSCOPY WITH CO2 INSUFFLATION N/A 4/4/2019    Procedure: COLONOSCOPY WITH CO2 INSUFFLATION;  Surgeon: " Moise Ingram MD;  Location: [facility] OR     HYSTERECTOMY, PAP NO LONGER INDICATED       SKIN GRAFT, EACH ADDN 100SQCM      buttocks to left  foot     SURGICAL HISTORY OF -       L wrist tendon     ZZC TOTAL ABDOM HYSTERECTOMY  2013    benign fibroids     ZZC TREAT ECTOPIC PREG,RMV TUBE/OVARY         Family History   Problem Relation Age of Onset     Diabetes Mother      Diabetes Maternal Grandmother      Hypertension Maternal Grandmother      Arthritis Maternal Grandmother      Cancer Maternal Grandmother         bladder cancer/cervix     Cancer Maternal Grandfather         bone     Asthma Son      Unknown/Adopted Paternal Grandfather      Unknown/Adopted Paternal Grandmother      Cervical Cancer Maternal Aunt        Social History     Tobacco Use     Smoking status: Never Smoker     Smokeless tobacco: Never Used   Substance Use Topics     Alcohol use: No     Alcohol/week: 0.0 standard drinks       Physical exam:     Vitals:/74   Pulse 111   Wt 67.3 kg (148 lb 6.4 oz)   LMP 06/10/2013 (Exact Date)   SpO2 96%   BMI 26.50 kg/m     BMI: Body mass index is 26.5 kg/m .      GEN: NAD, A&Ox3, appropriate  HEENT: EOMI, PERRLA, MMM, hearing grossly intact  Neck: full ROM  Cardiopulmonary: non labored, comfortable on RA, nondiaphoretic, no LE edema  Abdominal: soft, mild tenderness in LLQ to deep pressure, nondistended, no HSM, no rebound, no guarding, normoactive BS  Skin: no jaundice, no gross lesions on visible skin  Neuro: A&Ox3, grossly intact motor/sensation, gait intact  MSK: no gross deformity, moves arms/legs equally  Psych: normal affect, congruent with mood      Labs:   - all relevant labs reviewed    Relevant imaging:   - all relevant imaging reviewed  - only recent abdominal imagin21  ABDOMEN ONE VIEW  2021 8:28 AM      HISTORY: Rectal pain. Abdominal bloating.     COMPARISON: 10/12/2021                                                                        IMPRESSION: Moderate to large amount of stool. Nonobstructed bowel gas  pattern.     REE CELESTE MD         SYSTEM ID:  [id number]      Endoscopy:  - Colonoscopy: 4/4/19  Findings:        The perianal and digital rectal examinations were normal.        The colon (entire examined portion) appeared normal.        The terminal ileum appeared normal.        The exam was otherwise without abnormality.                                                                                     Moderate Sedation:        Moderate (conscious) sedation was administered by the endoscopy nurse        and supervised by the endoscopist. The following parameters were        monitored: oxygen saturation, heart rate, blood pressure, and response        to care. Total physician intraservice time was 17 minutes.   Impression:               - The entire examined colon is normal.                             - The examined portion of the ileum was normal.                             - The examination was otherwise normal.                             - No specimens collected.   Recommendation:           - I also was able to see in the last part of your                             small bowel and it too looked normal. No polyps or                             diverticulosis seen. Your colonoscopy was normal.                             Your next colonoscopy will be in 10 years as long                             as you do not have any changes in your bowel                             habits, or blood in your stool. I would recommend                             that you follow up with your regular doctor on at                             least an annual basis. For questions or to set up                             an appointment please give me a call at [phone].                             I have written a prescription for 0.2% nifedipine                             cream.                             Apply to the  anus where the muscles are snug about                             every 12 hours until pain is gone. This helps to                             relax this muscle to allow your anal fissure (cut                             at the anus) to heal.                             You must get this filled at the [facility] pharmacy since they make it for                             me.                             Discussed anal fissure treatment including fiber to                             soften the stool, nifedipine cream 0.2 % with                             prescription and how to use it every 12 hours, and                             viscus lidocaine 2% for discomfort and prescription                             given if patient wanted it.                             Moise Ingram MD                                                                                       ___________________   Moise Ingram MD   4/4/2019 11:05:05 AM   I was physically present for the entire viewing portion of the exam.Moise Ingram MD   Number of Addenda: 0       Pathology:   - Nothing to reivew    Relevant surgical reports:   - Nothing to review

## 2022-01-26 NOTE — LETTER
"    1/26/2022         RE: Lashonda Rivera  [address]  [place] [address]        Dear Colleague,    Thank you for referring your patient, Lashonda Rivera, to the [facility] [place]. Please see a copy of my visit note below.    [facility]:     New Consult: constipation    Date of visit: 1/26/2022    Referring provider: Pardeep Marcus MD    CC: 51 year old female referred by Pardeep Marcus for evaluation of constipation.    ASSESSMENT AND PLAN:    #. Chronic constipation  20+ year issue. No red flag symptoms. Normal C-scope (4/2019). Symptom constellation concerning for IBS-C, chronic idiopathic constipation and possible dyssynergic defecation (symptoms worsened since vaginal births).   -- maintain hydration, soluble fiber rich diet, and encourage exercise as she is able  -- bowel regimen:   -- BID miralax   -- adjunctive metamucil PRN   -- only using electrolyte enemas as absolute  rescue (goal would be to avoid these)  -- pelvic physiotherapy referral  -- patient would like a consult to health psychology, as she has been dealing with a number of psychosocial stressors and would like to process these as it relates to her constipation    #. History of anal fissure  -- can use topical calcium channel blocker PRN  -- warm bath soaks PRN    #. Average risk CRC surveillance  Last C-scope 4/2019. Due 2029.     Return to Clinic: 2 months with video visit    Polo Smith MD  Gastroenterology Fellow  Division of Gastroenterology, Hepatology and Nutrition  [facility]      Patient discussed and seen with Gastroenterology staff Dr. Bruce, who is in agreement with the above.     =======================================  HPI:     Ms. Rivera is a 51 yof seen today for chronic constipation.       Had been having bloating and stomach pains in early 20s, used \"all sorts\" of laxatives, though typically dulcolax. Has never been evaluated for this " "issue aside from a C-scope in 2019.     Has been treating herself at home with Miralax, metamucil, and fleets enemas since November 2021. She uses perhaps four fleets per week. This is a \"rescue\" when her symptoms are unbearable. . With the above therapies, she has 1-2 BMs per day that are \"soft and easy\". When she misses doses of the PEG or soluble fiber her bloating increases significantly and she needs to \"catch up\".     Some does experience some mild straining with stool passage, feels like she cannot totally empty her recum, bright red blood on toilet paper. Very infrequent, every few months or so.     Has a history of anal fissure (mid 2019), treated with nifedipine cream and this has resolved her issues. She feels that this soothes.     GI ROS: some nausea earlier this week though has resolved, unclear precipitant, otherwise ROS negative     Personal GI history: no history of IBD, CRC, or liver disease.    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     Past Medical History:   Diagnosis Date     Adjustment disorder with depressed mood 1/19/2016     Breast fibroadenoma, right 2016    excised     Complication of anesthesia     nausea/ use scop. patches     H/O: hysterectomy 2013     Hypothyroidism 2005    Now resolved     Leiomyoma of uterus, unspecified 2013    resolved     Menorrhagia 2000    resolved     Migraine headache 10/2/2012     Recurrent UTI 2012     TBI (traumatic brain injury), without LOC, sequela 9/12/2017     Vitamin B 12 deficiency 2005    Resolved "       Past Surgical History:   Procedure Laterality Date     BIOPSY BREAST Right 6/22/2016    Procedure: BIOPSY BREAST;  Surgeon: Kaiser Roy MD;  [facility]: [facility]     COLONOSCOPY WITH CO2 INSUFFLATION N/A 4/4/2019    Procedure: COLONOSCOPY WITH CO2 INSUFFLATION;  Surgeon: Moise Ingram MD;  Location: [facility] OR     HYSTERECTOMY, PAP NO LONGER INDICATED       SKIN GRAFT, EACH ADDN 100SQCM  1977    buttocks to left  foot     SURGICAL HISTORY OF -   2004    L wrist tendon     ZZC TOTAL ABDOM HYSTERECTOMY  06/11/2013    benign fibroids     ZZC TREAT ECTOPIC PREG,RMV TUBE/OVARY  1995       Family History   Problem Relation Age of Onset     Diabetes Mother      Diabetes Maternal Grandmother      Hypertension Maternal Grandmother      Arthritis Maternal Grandmother      Cancer Maternal Grandmother         bladder cancer/cervix     Cancer Maternal Grandfather         bone     Asthma Son      Unknown/Adopted Paternal Grandfather      Unknown/Adopted Paternal Grandmother      Cervical Cancer Maternal Aunt        Social History     Tobacco Use     Smoking status: Never Smoker     Smokeless tobacco: Never Used   Substance Use Topics     Alcohol use: No     Alcohol/week: 0.0 standard drinks       Physical exam:     Vitals:/74   Pulse 111   Wt 67.3 kg (148 lb 6.4 oz)   LMP 06/10/2013 (Exact Date)   SpO2 96%   BMI 26.50 kg/m     BMI: Body mass index is 26.5 kg/m .      GEN: NAD, A&Ox3, appropriate  HEENT: EOMI, PERRLA, MMM, hearing grossly intact  Neck: full ROM  Cardiopulmonary: non labored, comfortable on RA, nondiaphoretic, no LE edema  Abdominal: soft, mild tenderness in LLQ to deep pressure, nondistended, no HSM, no rebound, no guarding, normoactive BS  Skin: no jaundice, no gross lesions on visible skin  Neuro: A&Ox3, grossly intact motor/sensation, gait intact  MSK: no gross deformity, moves arms/legs equally  Psych: normal affect, congruent with mood      Labs:   - all relevant labs  reviewed    Relevant imaging:   - all relevant imaging reviewed  - only recent abdominal imagin21  ABDOMEN ONE VIEW  2021 8:28 AM      HISTORY: Rectal pain. Abdominal bloating.     COMPARISON: 10/12/2021                                                                       IMPRESSION: Moderate to large amount of stool. Nonobstructed bowel gas  pattern.     REE CELESTE MD         SYSTEM ID:  [id number]      Endoscopy:  - Colonoscopy: 19  Findings:        The perianal and digital rectal examinations were normal.        The colon (entire examined portion) appeared normal.        The terminal ileum appeared normal.        The exam was otherwise without abnormality.                                                                                     Moderate Sedation:        Moderate (conscious) sedation was administered by the endoscopy nurse        and supervised by the endoscopist. The following parameters were        monitored: oxygen saturation, heart rate, blood pressure, and response        to care. Total physician intraservice time was 17 minutes.   Impression:               - The entire examined colon is normal.                             - The examined portion of the ileum was normal.                             - The examination was otherwise normal.                             - No specimens collected.   Recommendation:           - I also was able to see in the last part of your                             small bowel and it too looked normal. No polyps or                             diverticulosis seen. Your colonoscopy was normal.                             Your next colonoscopy will be in 10 years as long                             as you do not have any changes in your bowel                             habits, or blood in your stool. I would recommend                             that you follow up with your regular doctor on at                             least an annual basis. For  questions or to set up                             an appointment please give me a call at [phone].                             I have written a prescription for 0.2% nifedipine                             cream.                             Apply to the anus where the muscles are snug about                             every 12 hours until pain is gone. This helps to                             relax this muscle to allow your anal fissure (cut                             at the anus) to heal.                             You must get this filled at the [place] [facility]) pharmacy since they make it for                             me.                             Discussed anal fissure treatment including fiber to                             soften the stool, nifedipine cream 0.2 % with                             prescription and how to use it every 12 hours, and                             viscus lidocaine 2% for discomfort and prescription                             given if patient wanted it.                             Moise Ingram MD                                                                                       ___________________   Moise Ingram MD   4/4/2019 11:05:05 AM   I was physically present for the entire viewing portion of the exam.Moise Ingram MD   Number of Addenda: 0       Pathology:   - Nothing to reivew    Relevant surgical reports:   - Nothing to review      Attestation signed by Jose Antonio Bruce MD at 2/9/2022  2:22 PM:  ATTENDING ATTESTATION:     DATE SEEN: 1/26/22    Patient was discussed, seen, and examined by me, Jose Antonio Bruce. The plan of care and pertinent data/imaging were also reviewed with the GI Fellow, Dr. Smith.  Agree with the above assessment and plan as delineated above with the following additions:     Constipation - manage with miralax. May be component of pelvic floor  dysfunction. IBS-C also possible. Manage with fiber and miralax. Referral to pelvic floor center.    Pt agrees to the plan.    Please contact me with any further questions.    Jose Antonio Bruce MD    [facility]  Division of Gastroenterology, Hepatology and Nutrition

## 2022-01-26 NOTE — NURSING NOTE
Chief Complaint   Patient presents with     New Patient       Vitals:    01/26/22 1450   BP: 114/74   Pulse: 111   SpO2: 96%   Weight: 67.3 kg (148 lb 6.4 oz)       Body mass index is 26.5 kg/m .    Marissa Palmer MA

## 2022-01-26 NOTE — PATIENT INSTRUCTIONS
Ms. Rivera,     It was a pleasure meeting you in clinic today! Please see below for out plan regarding to your constipation.     1. Increase miralax to twice a day (more if needed, less if diarrhea), add on metamucil if you helpful  2. Try to get to a place where Fleets enemas aren't required  3. Target/goal: 1 to 2 soft/easily passed stools per day  4. For the irritation at your anus, if you have worsening pain, try the nifedipine cream and/or soak in warm bath  5. Stay hydrated as you (try for 8 servings at least 8-12 ounces per day)  6. I will place healthy psychology consult today for you, they will reach out to you  7. I will place a pelvic physical therapy consult, they will reach out to you     Best,  Dr. Smith and Dr. Bruce

## 2022-02-01 ENCOUNTER — ANCILLARY PROCEDURE (OUTPATIENT)
Dept: MAMMOGRAPHY | Facility: CLINIC | Age: 52
End: 2022-02-01
Attending: PREVENTIVE MEDICINE
Payer: COMMERCIAL

## 2022-02-01 DIAGNOSIS — Z12.31 VISIT FOR SCREENING MAMMOGRAM: ICD-10-CM

## 2022-02-01 PROCEDURE — 77063 BREAST TOMOSYNTHESIS BI: CPT | Performed by: RADIOLOGY

## 2022-02-01 PROCEDURE — 77067 SCR MAMMO BI INCL CAD: CPT | Performed by: RADIOLOGY

## 2022-02-25 ENCOUNTER — MYC MEDICAL ADVICE (OUTPATIENT)
Dept: OBGYN | Facility: CLINIC | Age: 52
End: 2022-02-25
Payer: COMMERCIAL

## 2022-03-07 ENCOUNTER — VIRTUAL VISIT (OUTPATIENT)
Dept: NUTRITION | Facility: CLINIC | Age: 52
End: 2022-03-07
Attending: INTERNAL MEDICINE
Payer: COMMERCIAL

## 2022-03-07 DIAGNOSIS — E78.5 HYPERLIPIDEMIA LDL GOAL <70: ICD-10-CM

## 2022-03-07 PROCEDURE — 97802 MEDICAL NUTRITION INDIV IN: CPT | Mod: 95 | Performed by: DIETITIAN, REGISTERED

## 2022-03-07 NOTE — PROGRESS NOTES
Medical Nutrition Therapy  Visit Type:Initial assessment and intervention    Type of Service: Telephone Visit    Originating Location (Patient Location): Home  Distant Location (Provider Location): Home  Mode of Communication:  Telephone    Telephone Visit Start Time: 9:40  Telephone Visit End Time (telephone visit stop time): 10:13 am    How would patient like to obtain AVS? [place]        Lashonda Rivera presents today for MNT and education related to hyperlipidemia.   She is accompanied by self.     ASSESSMENT:   Patient comments/concerns relating to nutrition: Lashonda states that she requested the dietitian referral because she saw a poster in the clinic and would like to get off of her cholesterol meds. She stops eating around 6 pm -7 pm.     NUTRITION HISTORY:    Breakfast: Oatmeal (Faith instant with brown sugar, butter (country crock) and milk) with wheat toast (15 grain wheat bread with butter (country crock) OR 1 egg cup with potatoes, ham and pepper with some toast (2 pieces with country crock) OR yogurt parfait (yogplait with granola and apple or banana) - all with water   Lunch: brussels sprouts, siesta blend (black beans, rice, corn) and baked potato with gravy OR green beans and fried fish OR turkey and ham sandwich on 15 grain wheat bread with Mayonaise and chips on the side   Dinner: chicken and broccoli brook with garlic bread OR lasagna (meat and sausage) OR Salad kit   Snacks: Lays potato chips,   Dessert: 2 little keylime pieces of pie OR peach Cobbler   Beverages: Water all day (15 ounce- 3/day), Pepsi (2/week), occasional tea in the evening, no alcohol    Misses meals? Sometimes will eat one meal in the day, will eat breakfast late, then will not have lunch OR if eating lunch late will skip dinner  Eats out:  Seldom- maybe 1-2/week    Previous diet education:  No     Food allergies/intolerances: none    Diet is high in: carbs, fat (saturated) and fat (unsaturated)  Diet is low in:  "fiber, fruits and vegetables    EXERCISE: Used to do a lot of walking, once it got cold she stopped exercising. She was doing a \"walk away the pounds\" workouts. She isn't sure why she got off track.     SOCIO/ECONOMIC:   Lives with: self    MEDICATIONS:  Current Outpatient Medications   Medication     albuterol (PROAIR HFA/PROVENTIL HFA/VENTOLIN HFA) 108 (90 Base) MCG/ACT inhaler     atorvastatin (LIPITOR) 10 MG tablet     azithromycin (ZITHROMAX) 250 MG tablet     fluticasone (FLONASE) 50 MCG/ACT nasal spray     gabapentin (NEURONTIN) 100 MG capsule     indomethacin (INDOCIN) 25 MG capsule     metoclopramide (REGLAN) 10 MG tablet     Multiple Vitamins-Minerals (HAIR SKIN NAILS) CAPS     naproxen (NAPROSYN) 500 MG tablet     polyethylene glycol (MIRALAX) 17 GM/Dose powder     Probiotic Product (PROBIOTIC DAILY PO)     SUMAtriptan (IMITREX) 100 MG tablet     tiZANidine (ZANAFLEX) 4 MG tablet     Current Facility-Administered Medications   Medication     betamethasone acet & sod phos (CELESTONE) injection 6 mg       LABS:  Last Basic Metabolic Panel:  Lab Results   Component Value Date     07/06/2021      Lab Results   Component Value Date    POTASSIUM 4.1 07/06/2021     Lab Results   Component Value Date    CHLORIDE 106 07/06/2021     Lab Results   Component Value Date    IZAIAH 8.5 07/06/2021     Lab Results   Component Value Date    CO2 26 07/06/2021     Lab Results   Component Value Date    BUN 9 07/06/2021     Lab Results   Component Value Date    CR 0.89 07/06/2021     Lab Results   Component Value Date    GLC 75 07/06/2021       ANTHROPOMETRICS:  Vitals: LMP 06/10/2013 (Exact Date)   There is no height or weight on file to calculate BMI.      Wt Readings from Last 5 Encounters:   01/26/22 67.3 kg (148 lb 6.4 oz)   01/25/22 66.2 kg (146 lb)   12/31/21 80.2 kg (176 lb 12.8 oz)   11/04/21 64.4 kg (142 lb)   11/03/21 64.5 kg (142 lb 3.2 oz)       Weight Change: Slowly    NUTRITION DIAGNOSIS: Food- and " nutrition-related knowledge deficit related to limited previous nutrition education as evidenced by patient statements and new referral    NUTRITION INTERVENTION:  Education given to support: general nutrition guidelines, weight reduction, consistent meals, labeling, fat modification, exercise, dining out/special occasions, fiber, behavior modification, portion control and heart healthy diet  Education Materials Provided: My Plate Planner/Choose My Plate, Heart Health Guidelines and Heart Healthy Food Choices  Motivational Interviewing    PATIENT'S BEHAVIOR CHANGE GOALS:   See Patient Instructions for patient stated behavior change goals. AVS was printed and given to patient at today's appointment.    MONITOR / EVALUATE:  RD will monitor/evaluate:  Progress toward meeting stated nutrition-related goals  Readiness to change nutrition-related behaviors    FOLLOW-UP:  Follow up with RD as needed.  Call RD with questions/concerns.     Pham Caputo, THANH, LD, CDE  Time spent in minutes: 33  Encounter: Individual

## 2022-03-23 ENCOUNTER — OFFICE VISIT (OUTPATIENT)
Dept: GASTROENTEROLOGY | Facility: CLINIC | Age: 52
End: 2022-03-23
Payer: COMMERCIAL

## 2022-03-23 VITALS
HEART RATE: 78 BPM | SYSTOLIC BLOOD PRESSURE: 118 MMHG | DIASTOLIC BLOOD PRESSURE: 78 MMHG | HEIGHT: 63 IN | BODY MASS INDEX: 26.58 KG/M2 | WEIGHT: 150 LBS | OXYGEN SATURATION: 98 %

## 2022-03-23 DIAGNOSIS — K59.00 CONSTIPATION, UNSPECIFIED CONSTIPATION TYPE: ICD-10-CM

## 2022-03-23 DIAGNOSIS — K60.2 ANAL FISSURE: Primary | ICD-10-CM

## 2022-03-23 PROCEDURE — 99213 OFFICE O/P EST LOW 20 MIN: CPT | Mod: GC | Performed by: STUDENT IN AN ORGANIZED HEALTH CARE EDUCATION/TRAINING PROGRAM

## 2022-03-23 NOTE — PROGRESS NOTES
[facility]:     Follow up: constipation, anal fissure    Date of first visit: 1/26/2022    Today's visit: 03/23/22    Referring provider: Pardeep Marcus MD    CC: 51 year old female referred by Pardeep Marcus for evaluation of constipation.    ASSESSMENT AND PLAN:    #. Chronic constipation  20+ year issue. No red flag symptoms. Normal C-scope (4/2019). Symptom constellation concerning for IBS-C, chronic idiopathic constipation and possible dyssynergic defecation (symptoms worsened since vaginal births). Since instituting bowel regimen of BID miralax has near complete symptom resolution as of 3/23/22.   -- maintain hydration, soluble fiber rich diet, and encourage exercise as she is able  -- bowel regimen:   -- BID miralax (once once daily qPM with a qAM PRN dose)   -- adjunctive metamucil PRN   -- only using electrolyte enemas as absolute rescue (goal would be to avoid these long term)  -- pelvic physiotherapy referral, is waiting on her appt, needs to transfer records  -- patient following with psychologist/therapist, finds that her needs are met    #. History of anal fissure  Had been asymptomatic for some time, though feels like she bothered in the last few weeks.   -- can use topical calcium channel blocker, nifedipine ointment refilled today and sent to her pharmacy  -- warm bath soaks PRN, at least daily  -- keep up with above stool regimen to prevent straining    #. Average risk CRC surveillance  Last C-scope 4/2019. Due 2029.     Return to Clinic: 6 month return for video visit    Polo Smith MD  Gastroenterology Fellow  Division of Gastroenterology, Hepatology and Nutrition  [facility]    Patient discussed and seen with Gastroenterology staff Dr. Bruce, who is in agreement with the above.     =================================================================================    HPI:     Ms. Rivera is a 51 yof seen today for chronic constipation.     See note from  "1/26/22 with details of constipation history and initial intake HPI.     Today, she provides the following:    Overall, everything is \"so much better!\".  Has only needed a single enema since seeing me last (1/2022).     First week since starting bowel regimen her stools were large and she experienced some mild increased bloating, but since then has had three weeks of gradual improvement and then resolution. Her bloating, pain, and heaviness are all absolutely gone today.     BM behavior:  1-2 stools daily, soft, easy to pass,     Current management: Every evening has miralax, PRN dose in AM    Fissure symptoms:   - Has some pain that remains, last two weeks have been bad, warm baths are helpful. Running low on nifedipine, would like a refill. Thinks it may be do to more wiping.    Denies blood in her stool, denies melena. No bright red blood on TP.    We will send pelvic floor center her records at her request. Has not been able to get in for her appointment as of yet.     Went back to her previous therapist and is in a much better place today.     Targeted GI review of systems complete and is otherwise negative.     Past Medical History:   Diagnosis Date     Adjustment disorder with depressed mood 1/19/2016     Breast fibroadenoma, right 2016    excised     Complication of anesthesia     nausea/ use scop. patches     H/O: hysterectomy 2013     Hypothyroidism 2005    Now resolved     Leiomyoma of uterus, unspecified 2013    resolved     Menorrhagia 2000    resolved     Migraine headache 10/2/2012     Recurrent UTI 2012     TBI (traumatic brain injury), without LOC, sequela 9/12/2017     Vitamin B 12 deficiency 2005    Resolved       Past Surgical History:   Procedure Laterality Date     BIOPSY BREAST Right 6/22/2016    Procedure: BIOPSY BREAST;  Surgeon: Kaiser Roy MD;  Location: [facility]     COLONOSCOPY WITH CO2 INSUFFLATION N/A 4/4/2019    Procedure: COLONOSCOPY WITH CO2 INSUFFLATION;  Surgeon: Juan Jose" Moise Roberto MD;  Location: [facility] OR     HYSTERECTOMY, PAP NO LONGER INDICATED       SKIN GRAFT, EACH ADDN 100SQCM      buttocks to left  foot     SURGICAL HISTORY OF -       L wrist tendon     ZZC TOTAL ABDOM HYSTERECTOMY  2013    benign fibroids     ZZC TREAT ECTOPIC PREG,RMV TUBE/OVARY         Family History   Problem Relation Age of Onset     Diabetes Mother      Diabetes Maternal Grandmother      Hypertension Maternal Grandmother      Arthritis Maternal Grandmother      Cancer Maternal Grandmother         bladder cancer/cervix     Cancer Maternal Grandfather         bone     Asthma Son      Unknown/Adopted Paternal Grandfather      Unknown/Adopted Paternal Grandmother      Cervical Cancer Maternal Aunt        Social History     Tobacco Use     Smoking status: Never Smoker     Smokeless tobacco: Never Used   Substance Use Topics     Alcohol use: No     Alcohol/week: 0.0 standard drinks     Physical exam:     Vitals:LMP 06/10/2013 (Exact Date)    BMI: Body mass index is 26.57 kg/m .      GEN: NAD, A&Ox3, appropriate  HEENT: EOMI, PERRLA, MMM, hearing grossly intact  Neck: full ROM  Cardiopulmonary: non labored, comfortable on RA, nondiaphoretic, no LE edema  Abdominal: soft, nontender, no rebound, no masses felt   Skin: no jaundice, no gross lesions on visible skin  Neuro: A&Ox3, grossly intact motor/sensation, gait intact  MSK: no gross deformity, moves arms/legs equally  Psych: normal affect, congruent with mood      Labs:   - all relevant labs reviewed    Relevant imaging:   - all relevant imaging reviewed  - only recent abdominal imagin21  ABDOMEN ONE VIEW  2021 8:28 AM      HISTORY: Rectal pain. Abdominal bloating.     COMPARISON: 10/12/2021                                                                       IMPRESSION: Moderate to large amount of stool. Nonobstructed bowel gas  pattern.     REE CELESTE MD         SYSTEM ID:  [id number]      Endoscopy:  - Colonoscopy:  4/4/19  Findings:        The perianal and digital rectal examinations were normal.        The colon (entire examined portion) appeared normal.        The terminal ileum appeared normal.        The exam was otherwise without abnormality.                                                                                     Moderate Sedation:        Moderate (conscious) sedation was administered by the endoscopy nurse        and supervised by the endoscopist. The following parameters were        monitored: oxygen saturation, heart rate, blood pressure, and response        to care. Total physician intraservice time was 17 minutes.   Impression:               - The entire examined colon is normal.                             - The examined portion of the ileum was normal.                             - The examination was otherwise normal.                             - No specimens collected.   Recommendation:           - I also was able to see in the last part of your                             small bowel and it too looked normal. No polyps or                             diverticulosis seen. Your colonoscopy was normal.                             Your next colonoscopy will be in 10 years as long                             as you do not have any changes in your bowel                             habits, or blood in your stool. I would recommend                             that you follow up with your regular doctor on at                             least an annual basis. For questions or to set up                             an appointment please give me a call at [phone].                             I have written a prescription for 0.2% nifedipine                             cream.                             Apply to the anus where the muscles are snug about                             every 12 hours until pain is gone. This helps to                             relax this muscle to  allow your anal fissure (cut                             at the anus) to heal.                             You must get this filled at the [facility]) pharmacy since they make it for                             me.                             Discussed anal fissure treatment including fiber to                             soften the stool, nifedipine cream 0.2 % with                             prescription and how to use it every 12 hours, and                             viscus lidocaine 2% for discomfort and prescription                             given if patient wanted it.                             Moise Ingram MD                                                                                       ___________________   Moise Ingram MD   4/4/2019 11:05:05 AM   I was physically present for the entire viewing portion of the exam.Moise Ingram MD   Number of Addenda: 0       Pathology:   - Nothing to reivew    Relevant surgical reports:   - Nothing to review

## 2022-03-23 NOTE — NURSING NOTE
"Chief Complaint   Patient presents with     Follow Up       Vitals:    03/23/22 1401   BP: 118/78   BP Location: Left arm   Patient Position: Sitting   Cuff Size: Adult Regular   Pulse: 78   SpO2: 98%   Weight: 68 kg (150 lb)   Height: 1.6 m (5' 3\")       Body mass index is 26.57 kg/m .      Nakia Granados LPN                      "

## 2022-03-23 NOTE — LETTER
3/23/2022         RE: Lashonda Rivera  [address]  [place] [address]        Dear Colleague,    Thank you for referring your patient, Lashonda Rivera, to the [facility] [place]. Please see a copy of my visit note below.    [facility]:     Follow up: constipation, anal fissure    Date of first visit: 1/26/2022    Today's visit: 03/23/22    Referring provider: Pardeep Marcus MD    CC: 51 year old female referred by Pardeep Marcus for evaluation of constipation.    ASSESSMENT AND PLAN:    #. Chronic constipation  20+ year issue. No red flag symptoms. Normal C-scope (4/2019). Symptom constellation concerning for IBS-C, chronic idiopathic constipation and possible dyssynergic defecation (symptoms worsened since vaginal births). Since instituting bowel regimen of BID miralax has near complete symptom resolution as of 3/23/22.   -- maintain hydration, soluble fiber rich diet, and encourage exercise as she is able  -- bowel regimen:   -- BID miralax (once once daily qPM with a qAM PRN dose)   -- adjunctive metamucil PRN   -- only using electrolyte enemas as absolute rescue (goal would be to avoid these long term)  -- pelvic physiotherapy referral, is waiting on her appt, needs to transfer records  -- patient following with psychologist/therapist, finds that her needs are met    #. History of anal fissure  Had been asymptomatic for some time, though feels like she bothered in the last few weeks.   -- can use topical calcium channel blocker, nifedipine ointment refilled today and sent to her pharmacy  -- warm bath soaks PRN, at least daily  -- keep up with above stool regimen to prevent straining    #. Average risk CRC surveillance  Last C-scope 4/2019. Due 2029.     Return to Clinic: 6 month return for video visit    Polo Smith MD  Gastroenterology Fellow  Division of Gastroenterology, Hepatology and Nutrition  [facility]  "[place]    Patient discussed and seen with Gastroenterology staff Dr. Bruce, who is in agreement with the above.     =================================================================================    HPI:     Ms. Rivera is a 51 yof seen today for chronic constipation.     See note from 1/26/22 with details of constipation history and initial intake HPI.     Today, she provides the following:    Overall, everything is \"so much better!\".  Has only needed a single enema since seeing me last (1/2022).     First week since starting bowel regimen her stools were large and she experienced some mild increased bloating, but since then has had three weeks of gradual improvement and then resolution. Her bloating, pain, and heaviness are all absolutely gone today.     BM behavior:  1-2 stools daily, soft, easy to pass,     Current management: Every evening has miralax, PRN dose in AM    Fissure symptoms:   - Has some pain that remains, last two weeks have been bad, warm baths are helpful. Running low on nifedipine, would like a refill. Thinks it may be do to more wiping.    Denies blood in her stool, denies melena. No bright red blood on TP.    We will send pelvic floor center her records at her request. Has not been able to get in for her appointment as of yet.     Went back to her previous therapist and is in a much better place today.     Targeted GI review of systems complete and is otherwise negative.     Past Medical History:   Diagnosis Date     Adjustment disorder with depressed mood 1/19/2016     Breast fibroadenoma, right 2016    excised     Complication of anesthesia     nausea/ use scop. patches     H/O: hysterectomy 2013     Hypothyroidism 2005    Now resolved     Leiomyoma of uterus, unspecified 2013    resolved     Menorrhagia 2000    resolved     Migraine headache 10/2/2012     Recurrent UTI 2012     TBI (traumatic brain injury), without LOC, sequela 9/12/2017     Vitamin B 12 deficiency 2005    Resolved "       Past Surgical History:   Procedure Laterality Date     BIOPSY BREAST Right 2016    Procedure: BIOPSY BREAST;  Surgeon: Kaiser Roy MD;  Location: [facility]     COLONOSCOPY WITH CO2 INSUFFLATION N/A 2019    Procedure: COLONOSCOPY WITH CO2 INSUFFLATION;  Surgeon: Moise Ingram MD;  Location: [facility] OR     HYSTERECTOMY, PAP NO LONGER INDICATED       SKIN GRAFT, EACH ADDN 100SQCM      buttocks to left  foot     SURGICAL HISTORY OF -       L wrist tendon     ZZC TOTAL ABDOM HYSTERECTOMY  2013    benign fibroids     ZZC TREAT ECTOPIC PREG,RMV TUBE/OVARY         Family History   Problem Relation Age of Onset     Diabetes Mother      Diabetes Maternal Grandmother      Hypertension Maternal Grandmother      Arthritis Maternal Grandmother      Cancer Maternal Grandmother         bladder cancer/cervix     Cancer Maternal Grandfather         bone     Asthma Son      Unknown/Adopted Paternal Grandfather      Unknown/Adopted Paternal Grandmother      Cervical Cancer Maternal Aunt        Social History     Tobacco Use     Smoking status: Never Smoker     Smokeless tobacco: Never Used   Substance Use Topics     Alcohol use: No     Alcohol/week: 0.0 standard drinks     Physical exam:     Vitals:LMP 06/10/2013 (Exact Date)    BMI: Body mass index is 26.57 kg/m .      GEN: NAD, A&Ox3, appropriate  HEENT: EOMI, PERRLA, MMM, hearing grossly intact  Neck: full ROM  Cardiopulmonary: non labored, comfortable on RA, nondiaphoretic, no LE edema  Abdominal: soft, nontender, no rebound, no masses felt   Skin: no jaundice, no gross lesions on visible skin  Neuro: A&Ox3, grossly intact motor/sensation, gait intact  MSK: no gross deformity, moves arms/legs equally  Psych: normal affect, congruent with mood      Labs:   - all relevant labs reviewed    Relevant imaging:   - all relevant imaging reviewed  - only recent abdominal imagin21  ABDOMEN ONE VIEW  2021 8:28 AM      HISTORY: Rectal  pain. Abdominal bloating.     COMPARISON: 10/12/2021                                                                       IMPRESSION: Moderate to large amount of stool. Nonobstructed bowel gas  pattern.     REE CELESTE MD         SYSTEM ID:  [id number]      Endoscopy:  - Colonoscopy: 4/4/19  Findings:        The perianal and digital rectal examinations were normal.        The colon (entire examined portion) appeared normal.        The terminal ileum appeared normal.        The exam was otherwise without abnormality.                                                                                     Moderate Sedation:        Moderate (conscious) sedation was administered by the endoscopy nurse        and supervised by the endoscopist. The following parameters were        monitored: oxygen saturation, heart rate, blood pressure, and response        to care. Total physician intraservice time was 17 minutes.   Impression:               - The entire examined colon is normal.                             - The examined portion of the ileum was normal.                             - The examination was otherwise normal.                             - No specimens collected.   Recommendation:           - I also was able to see in the last part of your                             small bowel and it too looked normal. No polyps or                             diverticulosis seen. Your colonoscopy was normal.                             Your next colonoscopy will be in 10 years as long                             as you do not have any changes in your bowel                             habits, or blood in your stool. I would recommend                             that you follow up with your regular doctor on at                             least an annual basis. For questions or to set up                             an appointment please give me a call at [phone].                             I  have written a prescription for 0.2% nifedipine                             cream.                             Apply to the anus where the muscles are snug about                             every 12 hours until pain is gone. This helps to                             relax this muscle to allow your anal fissure (cut                             at the anus) to heal.                             You must get this filled at the [facility]) pharmacy since they make it for                             me.                             Discussed anal fissure treatment including fiber to                             soften the stool, nifedipine cream 0.2 % with                             prescription and how to use it every 12 hours, and                             viscus lidocaine 2% for discomfort and prescription                             given if patient wanted it.                             Moise Ingram MD                                                                                       ___________________   Moise Ingram MD   4/4/2019 11:05:05 AM   I was physically present for the entire viewing portion of the exam.Moise Ingram MD   Number of Addenda: 0       Pathology:   - Nothing to reivew    Relevant surgical reports:   - Nothing to review              Attestation signed by Jose Antonio Bruce MD at 4/4/2022 10:02 AM (Updated):  ATTENDING ATTESTATION:     DATE SEEN: 3/23/22    Patient was discussed, seen, and examined by me, Jose Antonio Bruce. The plan of care and pertinent data/imaging were also reviewed with the GI Fellow, Dr. Smith. Agree with the above assessment and plan with the following additions:     Symptoms greatly improved with miralax. Will continue.     Please contact me with any further questions.      Jose Antonio Bruce MD    [facility]  Division of Gastroenterology, Hepatology and  Nutrition      Polo Smith MD

## 2022-03-23 NOTE — PATIENT INSTRUCTIONS
Ms. Rivera,      It was pleasure seeing you in clinic again, I am glad that you are feeling so much better.     Our plan:    1. Continue at least daily Miralax, twice if you need it    2. Try to avoid enemas    3. Continue hydration, working on fiber in your diet, and exercise    4. We will send our GI records to the pelvic floor center, remember to call your OB/Gyn to have them forward those records, could also consider you PCP to forward records as well    5. Baths and nifedipine ointment for your fissure, if not improved in 10-14 days, please reach out.     Contact us if you have any questions,     Best,    Dr. Smith/

## 2022-04-11 ENCOUNTER — NURSE TRIAGE (OUTPATIENT)
Dept: NURSING | Facility: CLINIC | Age: 52
End: 2022-04-11
Payer: COMMERCIAL

## 2022-04-11 NOTE — TELEPHONE ENCOUNTER
Patient should be seen today. If there is nothing available in our dyad, could consider looking outside our dyad or she can go to UC today. They are open now.  KENNEDI Flowers CNP

## 2022-04-11 NOTE — TELEPHONE ENCOUNTER
Called patient, she is not able to speak at the moment to due to a work commitment.    Patient was given phone number to return a call to the [facility] nurses.    Patient states she will call back later.    RN, if patient returns call, please assist with scheduling appointment.    Jessika Loomis RN  [facility]

## 2022-04-11 NOTE — TELEPHONE ENCOUNTER
Nurse Triage SBAR    Is this a 2nd Level Triage? YES, LICENSED PRACTITIONER REVIEW IS REQUIRED  Please call patient at [phone] (home)   Routing to provider no available appointment until May per scheduling.    Please advise if able to work patient in to clinic or where you prefer to have patient seen. Per guidelines to see today in office.    Patient is available anytime today or tomorrow for appointment.    Situation: Patient calling reporting episode of chest pain occurring on 4/7/22 around 8 pm.    Background: Denies previous history of similar symptoms.    Assessment:     Patient calling reporting episode of left sided chest pain occurring last Thursday 4/7/22 around 8 pm.    Patient was at rest sitting up right in bed while on phone. Stating she was able to continue phone call with symptoms.     Denies injury or previous history.    Patient stating she has not had any symptoms since.     Requesting clinic appointment.     Patient agrees to call back if symptoms return or new symptoms develop.      Protocol Recommended Disposition:   See Today In Office    Recommendation:   Routing to provider no available appointment until May per scheduling.    Please advise if able to work patient in to clinic or where you prefer to have patient seen. Per guidelines to see today in office.      Routed to provider    Does the patient meet one of the following criteria for ADS visit consideration? 16+ years old, with an [facility] PCP     TIP  Providers, please consider if this condition is appropriate for management at one of our Acute and Diagnostic Services sites.     If patient is a good candidate, please use dotphrase <dot>triageresponse and select Refer to ADS to document.    Reason for Disposition    All other patients with chest pain (Exception: fleeting chest pain lasting a few seconds)    Additional Information    Negative: Severe difficulty breathing (e.g., struggling for each breath, speaks in single words)     Negative: Passed out (i.e., fainted, collapsed and was not responding)    Negative: Difficult to awaken or acting confused (e.g., disoriented, slurred speech)    Negative: Shock suspected (e.g., cold/pale/clammy skin, too weak to stand, low BP, rapid pulse)    Negative: Chest pain lasting longer than 5 minutes and ANY of the following:* Over 45 years old* Over 30 years old and at least one cardiac risk factor (e.g., diabetes, high blood pressure, high cholesterol, smoker, or strong family history of heart disease)* History of heart disease (i.e., angina, heart attack, heart failure, bypass surgery, takes nitroglycerin)* Pain is crushing, pressure-like, or heavy    Negative: Heart beating < 50 beats per minute OR > 140 beats per minute    Negative: Visible sweat on face or sweat dripping down face    Negative: Sounds like a life-threatening emergency to the triager    Negative: Followed an injury to chest    Negative: SEVERE chest pain    Negative: Pain also in shoulder(s) or arm(s) or jaw    Negative: Difficulty breathing    Negative: Cocaine use within last 3 days    Negative: Major surgery in the past month    Negative: Hip or leg fracture (broken bone) in past month (or had cast on leg or ankle in past month)    Negative: Illness requiring prolonged bedrest in past month (e.g., immobilization, long hospital stay)    Negative: Long-distance travel in past month (e.g., car, bus, train, plane; with trip lasting 6 or more hours)    Negative: History of prior 'blood clot' in leg or lungs (i.e., deep vein thrombosis, pulmonary embolism)    Negative: History of inherited increased risk of blood clots (e.g., Factor 5 Leiden, Anti-thrombin 3, Protein C or Protein S deficiency, Prothrombin mutation)    Negative: Heart beating irregularly or very rapidly    Negative: Chest pain lasting longer than 5 minutes and occurred in last 3 days (72 hours) (Exception: feels exactly the same as previously diagnosed heartburn and has  accompanying sour taste in mouth)    Negative: Chest pain or 'angina' comes and goes and is happening more often (increasing in frequency) or getting worse (increasing in severity) (Exception: chest pains that last only a few seconds)    Negative: Dizziness or lightheadedness    Negative: Coughing up blood    Negative: Patient sounds very sick or weak to the triager    Negative: Cancer treatment in the past two months (or has cancer now)    Negative: Patient says chest pain feels exactly the same as previously diagnosed 'heartburn'  and  describes burning in chest and accompanying sour taste in mouth    Negative: Fever > 100.4 F (38.0 C)    Negative: Chest pain(s) lasting a few seconds persists > 3 days    Negative: Rash in same area as pain (may be described as 'small blisters')    Protocols used: CHEST PAIN-A-[place]

## 2022-04-12 ENCOUNTER — NURSE TRIAGE (OUTPATIENT)
Dept: NURSING | Facility: CLINIC | Age: 52
End: 2022-04-12
Payer: COMMERCIAL

## 2022-04-12 NOTE — TELEPHONE ENCOUNTER
Writer contacted patient regarding provider message below. Patient was relayed provider message. Patient verbalized understanding.     Please see nurse triage encounter from today 4/12/22.    Patient stated that she will be seen in the urgent care today.     No further questions or concerns at this time.     Consuelo Traylor RN, BSN  [facility]

## 2022-04-12 NOTE — TELEPHONE ENCOUNTER
"Triage Call:     Pt calling to report that she previously called to discuss her chest pain and that she is calling today to make an appt.     Pt allowed writer to go through her sx with her:     Chest pain started last week on Thursday  She was sitting up in bed and started feeling tightness of chest and shortness of breath  She sat there until the pain subsided; approximately 5 minutes or so    Pt \"feels good\" today and has felt ok since then  She reports that she has a hx of this chest pain, but that it is happening more frequently    1 month ago she was getting these \"fluttery\" feelings when asked     Disposition: Go to ED/UCC now or office with PCP approval. Pt plans to go to the nearby [facility] today. Care advice was given.     Niecy Pierre RN  [facility] Nurse Advisor 2:23 PM 4/12/2022      Reason for Disposition    Chest pain or 'angina' comes and goes and is happening more often (increasing in frequency) or getting worse (increasing in severity) (Exception: chest pains that last only a few seconds)    Additional Information    Negative: Severe difficulty breathing (e.g., struggling for each breath, speaks in single words)    Negative: Passed out (i.e., fainted, collapsed and was not responding)    Negative: Difficult to awaken or acting confused (e.g., disoriented, slurred speech)    Negative: Shock suspected (e.g., cold/pale/clammy skin, too weak to stand, low BP, rapid pulse)    Negative: Chest pain lasting longer than 5 minutes and ANY of the following:* Over 45 years old* Over 30 years old and at least one cardiac risk factor (e.g., diabetes, high blood pressure, high cholesterol, smoker, or strong family history of heart disease)* History of heart disease (i.e., angina, heart attack, heart failure, bypass surgery, takes nitroglycerin)* Pain is crushing, pressure-like, or heavy    Negative: Heart beating < 50 beats per minute OR > 140 beats per minute    Negative: Visible sweat on face or sweat dripping " down face    Negative: Sounds like a life-threatening emergency to the triager    Negative: Followed an injury to chest    Negative: SEVERE chest pain    Negative: Pain also in shoulder(s) or arm(s) or jaw    Negative: Difficulty breathing    Negative: Cocaine use within last 3 days    Negative: Major surgery in the past month    Negative: Hip or leg fracture (broken bone) in past month (or had cast on leg or ankle in past month)    Negative: Illness requiring prolonged bedrest in past month (e.g., immobilization, long hospital stay)    Negative: Long-distance travel in past month (e.g., car, bus, train, plane; with trip lasting 6 or more hours)    Negative: History of prior 'blood clot' in leg or lungs (i.e., deep vein thrombosis, pulmonary embolism)    Negative: History of inherited increased risk of blood clots (e.g., Factor 5 Leiden, Anti-thrombin 3, Protein C or Protein S deficiency, Prothrombin mutation)    Negative: Heart beating irregularly or very rapidly    Negative: Chest pain lasting longer than 5 minutes and occurred in last 3 days (72 hours) (Exception: feels exactly the same as previously diagnosed heartburn and has accompanying sour taste in mouth)    Protocols used: CHEST PAIN-A-[place]    COVID 19 Nurse Triage Plan/Patient Instructions    Please be aware that novel coronavirus (COVID-19) may be circulating in the community. If you develop symptoms such as fever, cough, or SOB or if you have concerns about the presence of another infection including coronavirus (COVID-19), please contact your health care provider or visit [email or web address].     Disposition/Instructions    In-Person Visit with provider recommended. Reference Visit Selection Guide.    Thank you for taking steps to prevent the spread of this virus.  o Limit your contact with others.  o Wear a simple mask to cover your cough.  o Wash your hands well and often.    Resources    [facility]: About COVID-19:  [email or web address]    CDC: What to Do If You're Sick: www.cdc.gov/coronavirus/2019-ncov/about/steps-when-sick.html    CDC: Ending Home Isolation: www.cdc.gov/coronavirus/2019-ncov/hcp/disposition-in-home-patients.html     CDC: Caring for Someone: www.cdc.gov/coronavirus/2019-ncov/if-you-are-sick/care-for-someone.html     [facility]: Interim Guidance for Hospital Discharge to Home: www.[facility].mn./diseases/coronavirus/hcp/hospdischarge.pdf    [facility] clinical trials (COVID-19 research studies): [email or web address]-[email or web address]     Below are the COVID-19 hotlines at the [place] [facility] ([facility]). Interpreters are available.   o For health questions: Call 324-841-9868 or 1-339.725.6955 (7 a.m. to 7 p.m.)  o For questions about schools and childcare: Call 164-015-5048 or 1-133.662.3088 (7 a.m. to 7 p.m.)

## 2022-05-19 ENCOUNTER — OFFICE VISIT (OUTPATIENT)
Dept: URGENT CARE | Facility: URGENT CARE | Age: 52
End: 2022-05-19
Payer: COMMERCIAL

## 2022-05-19 VITALS
DIASTOLIC BLOOD PRESSURE: 79 MMHG | HEART RATE: 75 BPM | BODY MASS INDEX: 27.16 KG/M2 | WEIGHT: 153.3 LBS | SYSTOLIC BLOOD PRESSURE: 113 MMHG | TEMPERATURE: 96.9 F | RESPIRATION RATE: 20 BRPM | OXYGEN SATURATION: 100 %

## 2022-05-19 DIAGNOSIS — J06.9 VIRAL UPPER RESPIRATORY TRACT INFECTION WITH COUGH: Primary | ICD-10-CM

## 2022-05-19 DIAGNOSIS — R50.9 FEVER, UNSPECIFIED FEVER CAUSE: ICD-10-CM

## 2022-05-19 DIAGNOSIS — Z20.822 SUSPECTED 2019 NOVEL CORONAVIRUS INFECTION: ICD-10-CM

## 2022-05-19 LAB
FLUAV AG SPEC QL IA: NEGATIVE
FLUBV AG SPEC QL IA: NEGATIVE

## 2022-05-19 PROCEDURE — 99213 OFFICE O/P EST LOW 20 MIN: CPT | Mod: CS | Performed by: PHYSICIAN ASSISTANT

## 2022-05-19 PROCEDURE — U0003 INFECTIOUS AGENT DETECTION BY NUCLEIC ACID (DNA OR RNA); SEVERE ACUTE RESPIRATORY SYNDROME CORONAVIRUS 2 (SARS-COV-2) (CORONAVIRUS DISEASE [COVID-19]), AMPLIFIED PROBE TECHNIQUE, MAKING USE OF HIGH THROUGHPUT TECHNOLOGIES AS DESCRIBED BY CMS-2020-01-R: HCPCS | Performed by: PHYSICIAN ASSISTANT

## 2022-05-19 PROCEDURE — U0005 INFEC AGEN DETEC AMPLI PROBE: HCPCS | Performed by: PHYSICIAN ASSISTANT

## 2022-05-19 PROCEDURE — 87804 INFLUENZA ASSAY W/OPTIC: CPT | Performed by: PHYSICIAN ASSISTANT

## 2022-05-19 ASSESSMENT — ENCOUNTER SYMPTOMS
SHORTNESS OF BREATH: 0
JOINT SWELLING: 0
MYALGIAS: 1
ENDOCRINE NEGATIVE: 1
PALPITATIONS: 0
NAUSEA: 0
HEADACHES: 1
EYES NEGATIVE: 1
FEVER: 0
COUGH: 1
SORE THROAT: 1
LIGHT-HEADEDNESS: 0
NECK PAIN: 0
NECK STIFFNESS: 0
BACK PAIN: 0
RHINORRHEA: 0
CARDIOVASCULAR NEGATIVE: 1
WEAKNESS: 0
DIARRHEA: 0
ARTHRALGIAS: 0
ALLERGIC/IMMUNOLOGIC NEGATIVE: 1
CHILLS: 0
DIZZINESS: 0
VOMITING: 0
WOUND: 0

## 2022-05-19 NOTE — PROGRESS NOTES
Chief Complaint:     Chief Complaint   Patient presents with     URI     Sx started yesterday with a headache, sore throat, and achy body, a little of cough, declined strep test but will take the flu test        Results for orders placed or performed in visit on 05/19/22   Influenza A & B Antigen - Clinic Collect     Status: Normal    Specimen: Nose; Swab   Result Value Ref Range    Influenza A antigen Negative Negative    Influenza B antigen Negative Negative    Narrative    Test results must be correlated with clinical data. If necessary, results should be confirmed by a molecular assay or viral culture.       Medical Decision Making:    Vital signs reviewed by Paolo Magaña PA-C  /79 (BP Location: Right arm, Patient Position: Sitting, Cuff Size: Adult Regular)   Pulse 75   Temp 96.9  F (36.1  C) (Tympanic)   Resp 20   Wt 69.5 kg (153 lb 4.8 oz)   LMP 06/10/2013 (Exact Date)   SpO2 100%   BMI 27.16 kg/m      Differential Diagnosis:  URI Adult/Peds:  Bronchitis-viral, Influenza, Pneumonia, Viral syndrome and Viral upper respiratory illness        ASSESSMENT    1. Viral upper respiratory tract infection with cough    2. Suspected 2019 novel coronavirus infection    3. Fever, unspecified fever cause        PLAN    Patient is in no acute distress.    Temp is 96.9 in clinic today, lung sounds were clear, and O2 sats at 100% on RA.    Patient declined strep test.  Influenza was negative  COVID swab collected in clinic today.  Rest, Push fluids, vaporizer, elevation of head of bed.  Ibuprofen and or Tylenol for any fever or body aches.  Over the counter cough suppressant- PRN- as discussed.   If symptoms worsen, recheck immediately otherwise follow up with your PCP in 1 week if symptoms are not improving.  Worrisome symptoms discussed with instructions to go to the ED.  Patient verbalized understanding and agreed with this plan.    Labs:    Results for orders placed or performed in visit on 05/19/22    Influenza A & B Antigen - Clinic Collect     Status: Normal    Specimen: Nose; Swab   Result Value Ref Range    Influenza A antigen Negative Negative    Influenza B antigen Negative Negative    Narrative    Test results must be correlated with clinical data. If necessary, results should be confirmed by a molecular assay or viral culture.        Vital signs reviewed by Paolo Magaña PA-C  /79 (BP Location: Right arm, Patient Position: Sitting, Cuff Size: Adult Regular)   Pulse 75   Temp 96.9  F (36.1  C) (Tympanic)   Resp 20   Wt 69.5 kg (153 lb 4.8 oz)   LMP 06/10/2013 (Exact Date)   SpO2 100%   BMI 27.16 kg/m      Current Meds      Current Outpatient Medications:      albuterol (PROAIR HFA/PROVENTIL HFA/VENTOLIN HFA) 108 (90 Base) MCG/ACT inhaler, Inhale 2 puffs into the lungs every 4 hours as needed for shortness of breath / dyspnea or wheezing, Disp: 1 Inhaler, Rfl: 0     atorvastatin (LIPITOR) 10 MG tablet, Take 1 tablet (10 mg) by mouth daily, Disp: 90 tablet, Rfl: 3     azithromycin (ZITHROMAX) 250 MG tablet, Two tablets first day, then one tablet daily for four days. (Patient not taking: Reported on 1/25/2022), Disp: 6 tablet, Rfl: 1     fluticasone (FLONASE) 50 MCG/ACT nasal spray, Spray 1-2 sprays into both nostrils daily, Disp: 16 g, Rfl: 0     gabapentin (NEURONTIN) 100 MG capsule, Take 1-2 capsules (100-200 mg) by mouth At Bedtime (Patient not taking: Reported on 10/12/2021), Disp: 60 capsule, Rfl: 1     indomethacin (INDOCIN) 25 MG capsule, Take 2 capsules (50 mg) by mouth 3 times daily (with meals) for 3 days (Patient not taking: Reported on 3/23/2022), Disp: 18 capsule, Rfl: 2     metoclopramide (REGLAN) 10 MG tablet, Take 1 tablet (10 mg) by mouth 2 times daily as needed (migraine or nausea), Disp: 20 tablet, Rfl: 1     Multiple Vitamins-Minerals (HAIR SKIN NAILS) CAPS, , Disp: , Rfl:      naproxen (NAPROSYN) 500 MG tablet, Take 1 tablet (500 mg) by mouth 2 times daily (with meals),  Disp: 20 tablet, Rfl: 1     nifedipine 0.2% in white petrolatum 0.2 % OINT ointment, Apply topically 2 times daily, Disp: 100 g, Rfl: 1     polyethylene glycol (MIRALAX) 17 GM/Dose powder, Take 17 g (1 capful) by mouth 2 times daily, Disp: 289 g, Rfl: 3     Probiotic Product (PROBIOTIC DAILY PO), Take 2 tablets by mouth daily , Disp: , Rfl:      SUMAtriptan (IMITREX) 100 MG tablet, Take 1 tablet (100 mg) by mouth at onset of headache for migraine May repeat in 2 hours if needed: max 2/day, Disp: 18 tablet, Rfl: 1     tiZANidine (ZANAFLEX) 4 MG tablet, , Disp: , Rfl:     Current Facility-Administered Medications:      betamethasone acet & sod phos (CELESTONE) injection 6 mg, 6 mg, , , Tyrese Ortiz MD, 6 mg at 10/27/21 1346      Respiratory History    occasional episodes of bronchitis      SUBJECTIVE    HPI: Lashonda Rivera is an 51 year old female who presents with headache, body aches, chest congestion, cough nonproductive, occasional and sore throat.  Symptoms began 3  days ago and has unchanged.  There is no shortness of breath and wheezing.  Patient is eating and drinking well.  No fever, nausea, vomiting, or diarrhea.    Patient denies any recent travel or exposure to known COVID positive tested individual.      ROS:     Review of Systems   Constitutional: Negative for chills and fever.   HENT: Positive for congestion and sore throat. Negative for ear pain and rhinorrhea.    Eyes: Negative.    Respiratory: Positive for cough. Negative for shortness of breath.    Cardiovascular: Negative.  Negative for chest pain and palpitations.   Gastrointestinal: Negative for diarrhea, nausea and vomiting.   Endocrine: Negative.    Genitourinary: Negative.    Musculoskeletal: Positive for myalgias. Negative for arthralgias, back pain, joint swelling, neck pain and neck stiffness.   Skin: Negative.  Negative for rash and wound.   Allergic/Immunologic: Negative.  Negative for immunocompromised state.    Neurological: Positive for headaches. Negative for dizziness, weakness and light-headedness.         Family History   Family History   Problem Relation Age of Onset     Diabetes Mother      Diabetes Maternal Grandmother      Hypertension Maternal Grandmother      Arthritis Maternal Grandmother      Cancer Maternal Grandmother         bladder cancer/cervix     Cancer Maternal Grandfather         bone     Asthma Son      Unknown/Adopted Paternal Grandfather      Unknown/Adopted Paternal Grandmother      Cervical Cancer Maternal Aunt         Problem history  Patient Active Problem List   Diagnosis     Tension headache     Migraine headache     Adjustment disorder with depressed mood     Seasonal allergic rhinitis, unspecified allergic rhinitis trigger     TBI (traumatic brain injury), without LOC, sequela     Adnexal cyst     Low back pain     Lumbago with sciatica, left side     Hyperlipidemia LDL goal <70        Allergies  Allergies   Allergen Reactions     Medrol [Methylprednisolone] Itching     Promethazine Itching     Vicodin [Hydrocodone-Acetaminophen]      itchy        Social History  Social History     Socioeconomic History     Marital status:      Spouse name: no partner     Number of children: 2     Years of education: Not on file     Highest education level: Not on file   Occupational History     Occupation:    Tobacco Use     Smoking status: Never Smoker     Smokeless tobacco: Never Used   Substance and Sexual Activity     Alcohol use: No     Alcohol/week: 0.0 standard drinks     Drug use: No     Sexual activity: Yes     Partners: Male     Birth control/protection: Female Surgical   Other Topics Concern     Parent/sibling w/ CABG, MI or angioplasty before 65F 55M? Yes   Social History Narrative     Not on file     Social Determinants of Health     Financial Resource Strain: Not on file   Food Insecurity: Not on file   Transportation Needs: Not on file   Physical Activity: Not on file    Stress: Not on file   Social Connections: Not on file   Intimate Partner Violence: Not on file   Housing Stability: Not on file        OBJECTIVE     Vital signs reviewed by Paolo Magaña PA-C  /79 (BP Location: Right arm, Patient Position: Sitting, Cuff Size: Adult Regular)   Pulse 75   Temp 96.9  F (36.1  C) (Tympanic)   Resp 20   Wt 69.5 kg (153 lb 4.8 oz)   LMP 06/10/2013 (Exact Date)   SpO2 100%   BMI 27.16 kg/m       Physical Exam  Vitals and nursing note reviewed.   Constitutional:       General: She is not in acute distress.     Appearance: She is well-developed. She is not ill-appearing, toxic-appearing or diaphoretic.   HENT:      Head: Normocephalic and atraumatic.      Right Ear: Hearing, tympanic membrane, ear canal and external ear normal. Tympanic membrane is not perforated, erythematous, retracted or bulging.      Left Ear: Hearing, tympanic membrane, ear canal and external ear normal. Tympanic membrane is not perforated, erythematous, retracted or bulging.      Nose: Congestion present. No mucosal edema or rhinorrhea.      Right Sinus: No maxillary sinus tenderness or frontal sinus tenderness.      Left Sinus: No maxillary sinus tenderness or frontal sinus tenderness.      Mouth/Throat:      Pharynx: Posterior oropharyngeal erythema present. No pharyngeal swelling, oropharyngeal exudate or uvula swelling.      Tonsils: No tonsillar exudate or tonsillar abscesses. 0 on the right. 0 on the left.   Eyes:      General:         Right eye: No discharge.         Left eye: No discharge.      Pupils: Pupils are equal, round, and reactive to light.   Cardiovascular:      Rate and Rhythm: Normal rate and regular rhythm.      Heart sounds: Normal heart sounds. No murmur heard.    No friction rub. No gallop.   Pulmonary:      Effort: Pulmonary effort is normal. No respiratory distress.      Breath sounds: Normal breath sounds. No decreased breath sounds, wheezing, rhonchi or rales.   Chest:       Chest wall: No tenderness.   Abdominal:      General: Bowel sounds are normal. There is no distension.      Palpations: Abdomen is soft. There is no mass.      Tenderness: There is no abdominal tenderness. There is no guarding.   Musculoskeletal:      Cervical back: Normal range of motion and neck supple.   Lymphadenopathy:      Head:      Right side of head: No submental, submandibular, tonsillar, preauricular or posterior auricular adenopathy.      Left side of head: No submental, submandibular, tonsillar, preauricular or posterior auricular adenopathy.      Cervical: No cervical adenopathy.      Right cervical: No superficial or posterior cervical adenopathy.     Left cervical: No superficial or posterior cervical adenopathy.   Skin:     General: Skin is warm and dry.      Findings: No rash.   Neurological:      Mental Status: She is alert and oriented to person, place, and time.      Cranial Nerves: No cranial nerve deficit.      Deep Tendon Reflexes: Reflexes are normal and symmetric.   Psychiatric:         Behavior: Behavior normal. Behavior is cooperative.         Thought Content: Thought content normal.         Judgment: Judgment normal.           Paolo Magaña PA-C  5/19/2022, 1:17 PM

## 2022-05-20 LAB — SARS-COV-2 RNA RESP QL NAA+PROBE: NEGATIVE

## 2022-08-11 ENCOUNTER — OFFICE VISIT (OUTPATIENT)
Dept: FAMILY MEDICINE | Facility: CLINIC | Age: 52
End: 2022-08-11
Payer: COMMERCIAL

## 2022-08-11 VITALS
HEART RATE: 91 BPM | TEMPERATURE: 97.9 F | OXYGEN SATURATION: 99 % | SYSTOLIC BLOOD PRESSURE: 131 MMHG | WEIGHT: 148.6 LBS | DIASTOLIC BLOOD PRESSURE: 83 MMHG | BODY MASS INDEX: 26.32 KG/M2

## 2022-08-11 DIAGNOSIS — R43.1: ICD-10-CM

## 2022-08-11 DIAGNOSIS — E78.5 HYPERLIPIDEMIA LDL GOAL <70: Primary | ICD-10-CM

## 2022-08-11 LAB
ALT SERPL W P-5'-P-CCNC: 27 U/L (ref 0–50)
CHOLEST SERPL-MCNC: 113 MG/DL
FASTING STATUS PATIENT QL REPORTED: NO
FASTING STATUS PATIENT QL REPORTED: YES
GLUCOSE BLD-MCNC: 93 MG/DL (ref 70–99)
HDLC SERPL-MCNC: 51 MG/DL
LDLC SERPL CALC-MCNC: 51 MG/DL
NONHDLC SERPL-MCNC: 62 MG/DL
TRIGL SERPL-MCNC: 53 MG/DL

## 2022-08-11 PROCEDURE — 80061 LIPID PANEL: CPT | Performed by: FAMILY MEDICINE

## 2022-08-11 PROCEDURE — 84460 ALANINE AMINO (ALT) (SGPT): CPT | Performed by: FAMILY MEDICINE

## 2022-08-11 PROCEDURE — 99213 OFFICE O/P EST LOW 20 MIN: CPT | Performed by: FAMILY MEDICINE

## 2022-08-11 PROCEDURE — 82947 ASSAY GLUCOSE BLOOD QUANT: CPT | Performed by: FAMILY MEDICINE

## 2022-08-11 PROCEDURE — 36415 COLL VENOUS BLD VENIPUNCTURE: CPT | Performed by: FAMILY MEDICINE

## 2022-08-11 NOTE — PROGRESS NOTES
Assessment & Plan     (E78.5) Hyperlipidemia LDL goal <70  (primary encounter diagnosis)  Comment: fasting today  Plan: Lipid panel reflex to direct LDL Non-fasting,         ALT, Glucose        follow up with primary provider    (R43.1) Hypersensitivity to odor  Comment: no physical findings to explain the situation  Plan: discussed strategies to decrease odor exposures in the home (thought to be coming from a neighbor).      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  She exercises with enough effort to increase her heart rate 3 or less days per week.   She is taking medications regularly.       Concern - Inhaling something through vents.   Onset: when moved into [facility] last year  Description: initally odor of marijuana (confirmed by police who visited the neighbor who was smoking marijuana heavily). More recently the smell has changed to something that seems more chemical.  Occ causes runny nose, nasal congestion, burning sensation, headache. [I am not certain, but I proposed the possibility that the same neighbor is spraying something to mask the smell of the marijuana, or is vaping heavily)  Intensity: moderate  Progression of Symptoms:  waxing and waning  Accompanying Signs & Symptoms:   Previous history of similar problem: no  Precipitating factors:        Worsened by: unknown  Alleviating factors:        Improved by: nothing  Therapies tried and outcome:         Review of Systems         Objective    /83 (BP Location: Left  arm, Patient Position: Sitting, Cuff Size: Adult Regular)   Pulse 91   Temp 97.9  F (36.6  C) (Oral)   Wt 67.4 kg (148 lb 9.6 oz)   LMP 06/10/2013 (Exact Date)   SpO2 99%   BMI 26.32 kg/m    Body mass index is 26.32 kg/m .  Physical Exam   GENERAL: healthy, alert and no distress  EYES: Eyes grossly normal to inspection, PERRL, EOMI, sclerae white and conjunctivae normal  RESP: lungs clear to auscultation - no crackles or wheezes, no areas of dullness, no tachypnea  CV: Heart regular rate and rhythm without murmur, click or rub. No peripheral edema and peripheral pulses strong  MS: no gross musculoskeletal defects noted, no edema  SKIN: no suspicious lesions or rashes to visible skin  NEURO: Normal strength and tone, sensory exam grossly normal, mentation intact, oriented times 3 and cranial nerves 2-12 intact  PSYCH: mentation appears normal, affect normal/bright      Latest Reference Range & Units 01/18/22 07:05   Cholesterol <200 mg/dL 172   Patient Fasting > 8hrs?  Yes   HDL Cholesterol >=50 mg/dL 54   LDL Cholesterol Calculated <=100 mg/dL 104 (H)   Non HDL Cholesterol <130 mg/dL 118   Triglycerides <150 mg/dL 71   (H): Data is abnormally high                .  ..

## 2022-08-13 ENCOUNTER — MYC MEDICAL ADVICE (OUTPATIENT)
Dept: FAMILY MEDICINE | Facility: CLINIC | Age: 52
End: 2022-08-13

## 2022-08-15 NOTE — TELEPHONE ENCOUNTER
To provider to review and advise,  Patient looking for interpretation of lab results from 8/11/22 office visit.  Jessika Loomis RN  [facility]

## 2022-08-29 ENCOUNTER — TELEPHONE (OUTPATIENT)
Dept: GASTROENTEROLOGY | Facility: CLINIC | Age: 52
End: 2022-08-29

## 2022-08-29 NOTE — TELEPHONE ENCOUNTER
LVm and sent my chart letting pt know we need to reschedule appointment. Left K pod number to move pt from Tuesday to Wednesday.

## 2022-08-31 ENCOUNTER — TELEPHONE (OUTPATIENT)
Dept: GASTROENTEROLOGY | Facility: CLINIC | Age: 52
End: 2022-08-31

## 2022-08-31 NOTE — TELEPHONE ENCOUNTER
LVMx2 sent my chart message letting pt know the provider is unavailable and we have canceled their appointment.     Reschedule with Lius next available

## 2022-10-16 ENCOUNTER — HEALTH MAINTENANCE LETTER (OUTPATIENT)
Age: 52
End: 2022-10-16

## 2022-11-02 ENCOUNTER — PATIENT OUTREACH (OUTPATIENT)
Dept: FAMILY MEDICINE | Facility: CLINIC | Age: 52
End: 2022-11-02

## 2022-11-02 NOTE — TELEPHONE ENCOUNTER
Patient Quality Outreach    Patient is due for the following:   Physical Preventive Adult Physical      Topic Date Due     Hepatitis B Vaccine (1 of 3 - 3-dose series) Never done     COVID-19 Vaccine (4 - Booster for Moderna series) 02/21/2022     Flu Vaccine (1) 09/01/2022     Diptheria Tetanus Pertussis (DTAP/TDAP/TD) Vaccine (3 - Td or Tdap) 09/04/2022     Zoster (Shingles) Vaccine (1 of 2) 09/09/2022       Next Steps:   Patient has upcoming appointment, these items will be addressed at that time.    Type of outreach:    Patient has upcoming appointment, these items will be addressed at that time.      Questions for provider review:    None     Marixa Dyer

## 2022-11-18 ENCOUNTER — LAB (OUTPATIENT)
Dept: URGENT CARE | Facility: URGENT CARE | Age: 52
End: 2022-11-18
Payer: COMMERCIAL

## 2022-11-18 ENCOUNTER — NURSE TRIAGE (OUTPATIENT)
Dept: NURSING | Facility: CLINIC | Age: 52
End: 2022-11-18

## 2022-11-18 DIAGNOSIS — Z20.822 SUSPECTED COVID-19 VIRUS INFECTION: ICD-10-CM

## 2022-11-18 PROCEDURE — 99207 PR NO CHARGE LOS: CPT

## 2022-11-18 PROCEDURE — U0003 INFECTIOUS AGENT DETECTION BY NUCLEIC ACID (DNA OR RNA); SEVERE ACUTE RESPIRATORY SYNDROME CORONAVIRUS 2 (SARS-COV-2) (CORONAVIRUS DISEASE [COVID-19]), AMPLIFIED PROBE TECHNIQUE, MAKING USE OF HIGH THROUGHPUT TECHNOLOGIES AS DESCRIBED BY CMS-2020-01-R: HCPCS

## 2022-11-18 PROCEDURE — U0005 INFEC AGEN DETEC AMPLI PROBE: HCPCS

## 2022-11-18 NOTE — TELEPHONE ENCOUNTER
Is this a 2nd Level Triage? NO    Situation: Possible Covid, Diarrhea.     Background:   Symptoms started yesterday, Covid test is negative yesterday.    Assessment:   Pt reports may diarrhea stools yesterday, and so far today 3. Pt has diarrhea, nausea, headache, body aches, sore throat, chills, abdominal cramping. Pt has a thermometer, but cant get it to work. Pt denies difficulty breathing chest tightness, or shortness of breath.   Care advice given:    GENERAL CARE ADVICE FOR COVID-19 SYMPTOMS:  * The symptoms are generally treated the same whether you have COVID-19, influenza or some other respiratory virus.  * Cough: Use cough drops.  * Feeling dehydrated: Drink extra liquids. If the air in your home is dry, use a humidifier.  * Fever: For fever over 101 F (38.3 C), take acetaminophen every 4 to 6 hours (Adults 650 mg) OR ibuprofen every 6 to 8 hours (Adults 400 mg). Before taking any medicine, read all the instructions on the package. Do not take aspirin unless your doctor has prescribed it for you.  * Muscle aches, headache, and other pains: Often this comes and goes with the fever. Take acetaminophen every 4 to 6 hours (Adults 650 mg) OR ibuprofen every 6 to 8 hours (Adults 400 mg). Before taking any medicine, read all the instructions on the package.  * Sore throat: Try throat lozenges, hard candy or warm chicken broth.      Protocol Recommended Disposition:   Home Care     Recommendation:   Per protocol, pt should be able to treat these symptoms at home. Pt was advised to have a PCR test, as they are more accurate. Pt was transferred to scheduling to schedule an appointment for a PCR test.  Pt was advised to:    CALL BACK IF:   * Fever over 103 F (39.4 C)  * Fever lasts over 3 days  * Fever returns after being gone for 24 hour  * Chest pain or difficulty breathing occurs  * You become worse     Pt verbalized understanding.    Michael Gaytan RN on 11/18/2022 at 10:58 AM      COVID 19 Nurse Triage  Plan/Patient Instructions    Please be aware that novel coronavirus (COVID-19) may be circulating in the community. If you develop symptoms such as fever, cough, or SOB or if you have concerns about the presence of another infection including coronavirus (COVID-19), please contact your health care provider or visit [email or web address].     Disposition/Instructions    Home care recommended. Follow home care protocol based instructions.    Thank you for taking steps to prevent the spread of this virus.  o Limit your contact with others.  o Wear a simple mask to cover your cough.  o Wash your hands well and often.    Resources    [facility]: About COVID-19: [email or web address]    CDC: What to Do If You're Sick: www.cdc.gov/coronavirus/2019-ncov/about/steps-when-sick.html    CDC: Ending Home Isolation: www.cdc.gov/coronavirus/2019-ncov/hcp/disposition-in-home-patients.html     CDC: Caring for Someone: www.cdc.gov/coronavirus/2019-ncov/if-you-are-sick/care-for-someone.html     [facility]: Interim Guidance for Hospital Discharge to Home: www.health.[facility].[place].us/diseases/coronavirus/hcp/hospdischarge.pdf    [facility] clinical trials (COVID-19 research studies): clinicalaffairs.[facility]-clinical-trials     Below are the COVID-19 hotlines at the [place] Department of Health ([facility]). Interpreters are available.   o For health questions: Call 279-935-2147 or 1-362.316.5118 (7 a.m. to 7 p.m.)  o For questions about schools and childcare: Call 179-109-3003 or 1-828.612.5780 (7 a.m. to 7 p.m.)     Reason for Disposition    [1] COVID-19 infection suspected by caller or triager AND [2] mild symptoms (cough, fever, or others) AND [3] has not gotten tested yet    Additional Information    Negative: SEVERE difficulty breathing (e.g., struggling for each breath, speaks in single words)    Negative: Difficult to awaken or acting confused (e.g., disoriented, slurred speech)    Negative: Bluish (or  gray) lips or face now    Negative: Shock suspected (e.g., cold/pale/clammy skin, too weak to stand, low BP, rapid pulse)    Negative: Sounds like a life-threatening emergency to the triager    Negative: [1] Diagnosed or suspected COVID-19 AND [2] symptoms lasting 3 or more weeks    Negative: [1] COVID-19 exposure AND [2] no symptoms    Negative: COVID-19 vaccine reaction suspected (e.g., fever, headache, muscle aches) occurring 1 to 3 days after getting vaccine    Negative: COVID-19 vaccine, questions about    Negative: [1] Lives with someone known to have influenza (flu test positive) AND [2] flu-like symptoms (e.g., cough, runny nose, sore throat, SOB; with or without fever)    Negative: [1] Adult with possible COVID-19 symptoms AND [2] triager concerned about severity of symptoms or other causes    Negative: COVID-19 and breastfeeding, questions about    Negative: SEVERE or constant chest pain or pressure  (Exception: Mild central chest pain, present only when coughing.)    Negative: MODERATE difficulty breathing (e.g., speaks in phrases, SOB even at rest, pulse 100-120)    Negative: Headache and stiff neck (can't touch chin to chest)    Negative: Oxygen level (e.g., pulse oximetry) 90 percent or lower    Negative: Chest pain or pressure  (Exception: MILD central chest pain, present only when coughing)    Negative: Patient sounds very sick or weak to the triager    Negative: MILD difficulty breathing (e.g., minimal/no SOB at rest, SOB with walking, pulse <100)    Negative: Fever > 103 F (39.4 C)    Negative: [1] Fever > 101 F (38.3 C) AND [2] over 60 years of age    Negative: [1] Fever > 100.0 F (37.8 C) AND [2] bedridden (e.g., nursing home patient, CVA, chronic illness, recovering from surgery)    Negative: [1] HIGH RISK for severe COVID complications (e.g., weak immune system, age > 64 years, obesity with BMI of 30 or higher, pregnant, chronic lung disease or other chronic medical condition) AND [2] COVID  symptoms (e.g., cough, fever)  (Exceptions: Already seen by PCP and no new or worsening symptoms.)    Negative: [1] HIGH RISK patient AND [2] influenza is widespread in the community AND [3] ONE OR MORE respiratory symptoms: cough, sore throat, runny or stuffy nose    Negative: [1] HIGH RISK patient AND [2] influenza exposure within the last 7 days AND [3] ONE OR MORE respiratory symptoms: cough, sore throat, runny or stuffy nose    Negative: Oxygen level (e.g., pulse oximetry) 91 to 94 percent    Negative: [1] COVID-19 infection suspected by caller or triager AND [2] mild symptoms (cough, fever, or others) AND [3] negative COVID-19 rapid test    Negative: Fever present > 3 days (72 hours)    Negative: [1] Fever returns after gone for over 24 hours AND [2] symptoms worse or not improved    Negative: [1] Continuous (nonstop) coughing interferes with work or school AND [2] no improvement using cough treatment per Care Advice    Negative: Cough present > 3 weeks    Negative: [1] COVID-19 diagnosed by positive lab test (e.g., PCR, rapid self-test kit) AND [2] NO symptoms (e.g., cough, fever, others)    Negative: [1] COVID-19 diagnosed by positive lab test (e.g., PCR, rapid self-test kit) AND [2] mild symptoms (e.g., cough, fever, others) AND [3] no complications or SOB    Negative: [1] COVID-19 diagnosed by doctor (or NP/PA) AND [2] mild symptoms (e.g., cough, fever, others) AND [3] no complications or SOB    Negative: [1] COVID-19 diagnosed AND [2] has mild nausea, vomiting or diarrhea    Protocols used: CORONAVIRUS (COVID-19) DIAGNOSED OR [id number]

## 2022-11-19 ENCOUNTER — NURSE TRIAGE (OUTPATIENT)
Dept: NURSING | Facility: CLINIC | Age: 52
End: 2022-11-19

## 2022-11-19 LAB — SARS-COV-2 RNA RESP QL NAA+PROBE: NEGATIVE

## 2022-11-19 NOTE — TELEPHONE ENCOUNTER
Nurse Triage SBAR    Is this a 2nd Level Triage? YES, LICENSED PRACTITIONER REVIEW IS REQUIRED    Situation: requests influenza and strep test    Background:   Negative COVID test at work via PCR    Pt claims that she was told by previous FNA that she can get all 3 tests (COVID, influenza and strep) done at the same time, and found out that only COVID test was done. FNA advised that the other 2 requires an order from provider    Symptoms started on Thursday 11/17    Assessment:   Headache which does not go away with Tylenol/ibuprofen  Diarrhea  - improved, 2 episodes. Thursday severe  Upset stomach - 7.5/10, burning, bubbly feeling. Yesterday, lower left quadrant pain. Pt also has not been eating much.  Body aches  Feels hot and cold, pt has not checked her temperature    Protocol Recommended Disposition:   Call PCP Now    Recommendation:   Virtual visit.     Paged to provider  Dr. Jay paged via smart web at 10:27 AM  Does the patient meet one of the following criteria for ADS visit consideration? 16+ years old, with an [facility] PCP     Dr. Jay called back, advised home care with fluids, rest. If symptoms are getting worse, then get seen at UC or ED  Tamiflu considered marginally beneficial so not basically recommended for low risk groups.  Symptomatic treatment at home would be helpful. If UC can do strep and influenza test today, okay for on call to authorize orders. Otherwise, pts do not need to specifically know the virus causing their symptoms and can be managed with home treatment for now.         TIP  Providers, please consider if this condition is appropriate for management at one of our Acute and Diagnostic Services sites.     If patient is a good candidate, please use dotphrase <dot>triageresponse and select Refer to ADS to document.        Reason for Disposition    [1] HIGH RISK patient AND [2] influenza is widespread in the community AND [3] ONE OR MORE respiratory symptoms: cough, sore throat, runny or  stuffy nose    Additional Information    Negative: SEVERE difficulty breathing (e.g., struggling for each breath, speaks in single words)    Negative: Difficult to awaken or acting confused (e.g., disoriented, slurred speech)    Negative: Bluish (or gray) lips or face now    Negative: Shock suspected (e.g., cold/pale/clammy skin, too weak to stand, low BP, rapid pulse)    Negative: Sounds like a life-threatening emergency to the triager    Negative: SEVERE or constant chest pain or pressure  (Exception: Mild central chest pain, present only when coughing.)    Negative: MODERATE difficulty breathing (e.g., speaks in phrases, SOB even at rest, pulse 100-120)    Negative: [1] Headache AND [2] stiff neck (can't touch chin to chest)    Negative: Oxygen level (e.g., pulse oximetry) 90 percent or lower    Negative: Chest pain or pressure    Negative: Patient sounds very sick or weak to the triager    Negative: MILD difficulty breathing (e.g., minimal/no SOB at rest, SOB with walking, pulse <100)    Negative: Fever > 103 F (39.4 C)    Negative: [1] Fever > 101 F (38.3 C) AND [2] age > 60 years    Negative: [1] Fever > 100.0 F (37.8 C) AND [2] bedridden (e.g., nursing home patient, CVA, chronic illness, recovering from surgery)    Negative: HIGH RISK for severe COVID complications (e.g., weak immune system, age > 64 years, obesity with BMI > 25, pregnant, chronic lung disease or other chronic medical condition)  (Exception: Already seen by PCP and no new or worsening symptoms.)    Protocols used: CORONAVIRUS (COVID-19) DIAGNOSED OR [id number] 1.18.2022

## 2022-11-19 NOTE — TELEPHONE ENCOUNTER
Provider Recommendation Follow Up:   Reached patient/caregiver. Informed of provider's recommendations. Patient verbalized understanding and agrees with the plan.     -UC today if symptoms are not better or worsens even with home treatment  - Imitrex for headache since pt has history of migraines  - diet - clear fluids for 2 hours, then full liquids, then soft diet and BRAT diet. See if this helps with abdominal burning pain  - rest  - symptoms usually last 7 days    Cynthia Prado RN/[place] Nurse Advisor         Reviewed (7/30): H/H 8.4/26.4, POCT 308, BUN 23, Cr. 1.6, Glu 189

## 2022-11-20 NOTE — TELEPHONE ENCOUNTER
Follow up call - 11/20/22 9:30 AM    Pt reports that headache is gone after taking Imitrex  Abdominal cramping is resolved  Diarrhea has subsided. No diarrhea yet today    Pt advised to continue with current home care advice, call back if symptoms are getting worse.    Pt verbalized appreciation.    Cynthia Prado RN/[place] Nurse Advisor

## 2022-12-03 ENCOUNTER — HEALTH MAINTENANCE LETTER (OUTPATIENT)
Age: 52
End: 2022-12-03

## 2022-12-19 ENCOUNTER — NURSE TRIAGE (OUTPATIENT)
Dept: NURSING | Facility: CLINIC | Age: 52
End: 2022-12-19

## 2022-12-19 ENCOUNTER — OFFICE VISIT (OUTPATIENT)
Dept: FAMILY MEDICINE | Facility: CLINIC | Age: 52
End: 2022-12-19
Payer: COMMERCIAL

## 2022-12-19 VITALS
DIASTOLIC BLOOD PRESSURE: 75 MMHG | OXYGEN SATURATION: 96 % | TEMPERATURE: 98.1 F | RESPIRATION RATE: 14 BRPM | HEART RATE: 98 BPM | HEIGHT: 63 IN | SYSTOLIC BLOOD PRESSURE: 120 MMHG | BODY MASS INDEX: 26.4 KG/M2 | WEIGHT: 149 LBS

## 2022-12-19 DIAGNOSIS — H65.111 NON-RECURRENT ACUTE ALLERGIC OTITIS MEDIA OF RIGHT EAR: Primary | ICD-10-CM

## 2022-12-19 DIAGNOSIS — R07.0 THROAT PAIN: ICD-10-CM

## 2022-12-19 LAB
DEPRECATED S PYO AG THROAT QL EIA: NEGATIVE
GROUP A STREP BY PCR: NOT DETECTED

## 2022-12-19 PROCEDURE — 87651 STREP A DNA AMP PROBE: CPT | Performed by: FAMILY MEDICINE

## 2022-12-19 PROCEDURE — 99214 OFFICE O/P EST MOD 30 MIN: CPT | Performed by: FAMILY MEDICINE

## 2022-12-19 ASSESSMENT — ENCOUNTER SYMPTOMS: SORE THROAT: 1

## 2022-12-19 ASSESSMENT — PAIN SCALES - GENERAL: PAINLEVEL: SEVERE PAIN (6)

## 2022-12-19 NOTE — LETTER
December 19, 2022      Lashonda M Nicole  [address]  [place] [address]        To Whom It May Concern:    Lashonda ZACARIAS Rivera  was seen on 12/19/2022.  Please accomodate her to work virtually for the next 2 days due to illness.        Sincerely,  Arianne Tejada MD

## 2022-12-19 NOTE — PROGRESS NOTES
"  Assessment & Plan     Non-recurrent acute allergic otitis media of right ear  -Right ear TM erythematous with middle ear effusion.  -Since this is Day 2 of right ear pain with no fever, suggested to wait for 2 to 3 days before taking antibiotics.  -Recommended Tylenol/ibuprofen as needed, antihistamines, cough suppressants.  -Prescribed Augmentin to have on hand.  Recommended to use them after 2 days if in case she develops any fever/worsening right ear discomfort.  -Provided letter to work from home for the next 2 days.    - amoxicillin-clavulanate (AUGMENTIN) 875-125 MG tablet; Take 1 tablet by mouth 2 times daily for 7 days    Throat pain  -Strep test done.  -Encouraged warm water, soups,tylenol prn    - Streptococcus A Rapid Screen w/Reflex to PCR - Clinic Collect  - Group A Streptococcus PCR Throat Swab         BMI:   Estimated body mass index is 26.39 kg/m  as calculated from the following:    Height as of this encounter: 1.6 m (5' 3\").    Weight as of this encounter: 67.6 kg (149 lb).           Return in about 5 days (around 12/24/2022), or if symptoms worsen or fail to improve.    Arianne Tejada MD  [facility]    Geraldine Gallego is a 52 year old, presenting for the following health issues:  Pharyngitis      Pharyngitis     History of Present Illness       Reason for visit:  Soar throat, stuffy nose and ear ache  Symptom onset:  1-3 days ago    She eats 2-3 servings of fruits and vegetables daily.She consumes 1 sweetened beverage(s) daily.She exercises with enough effort to increase her heart rate 9 or less minutes per day.  She exercises with enough effort to increase her heart rate 3 or less days per week.   She is taking medications regularly.  Sore throat since yesterday  Ears hurt with runny nose  No fever  No seasonal allergies/sinus issues in the past.    Acute Illness  Acute illness concerns: Sore throat  Onset/Duration: a day " "ago  Symptoms:  Fever: No  Chills/Sweats: No  Headache (location?): YES  Sinus Pressure: YES  Conjunctivitis:  No  Ear Pain: YES: right  Rhinorrhea: YES  Congestion: No  Sore Throat: YES  Cough: no  Wheeze: No  Decreased Appetite: YES  Nausea: No  Vomiting: No  Diarrhea: No  Dysuria/Freq.: No  Dysuria or Hematuria: No  Fatigue/Achiness: YES  Sick/Strep Exposure: work  Therapies tried and outcome: IBU      Review of Systems   HENT: Positive for sore throat.       Constitutional, HEENT, cardiovascular, pulmonary, gi and gu systems are negative, except as otherwise noted.      Objective    /75 (BP Location: Left arm, Patient Position: Chair, Cuff Size: Adult Large)   Pulse 98   Temp 98.1  F (36.7  C) (Tympanic)   Resp 14   Ht 1.6 m (5' 3\")   Wt 67.6 kg (149 lb)   LMP 06/10/2013 (Exact Date)   SpO2 96%   BMI 26.39 kg/m    Body mass index is 26.39 kg/m .  Physical Exam   GENERAL: healthy, alert and no distress  NECK: no adenopathy, no asymmetry, masses, or scars and thyroid normal to palpation  RESP: lungs clear to auscultation - no rales, rhonchi or wheezes  CV: regular rate and rhythm, normal S1 S2, no S3 or S4, no murmur, click or rub, no peripheral edema and peripheral pulses strong  ABDOMEN: soft, nontender, no hepatosplenomegaly, no masses and bowel sounds normal  MS: no gross musculoskeletal defects noted, no edema                    "

## 2022-12-19 NOTE — TELEPHONE ENCOUNTER
Triage call:    Patient reports symptoms of sore throat, stuffy & runny nose, & sharp pain in R ear.  The patient reports her symptoms started yesterday.  She rates her throat pain a 6/10.  The patient denies any difficulty breathing.    The patient reports using Nyquil & gargling salt water w/o any improvement.    Per protocol, writer advised patient to be seen in office today.  Patient agrees w/ disposition & was able to make an appointment today.    Yue Gunn RN on 12/19/2022 at 7:14 AM    Reason for Disposition    Earache also present    Additional Information    Negative: SEVERE difficulty breathing (e.g., struggling for each breath, speaks in single words)    Negative: Sounds like a life-threatening emergency to the triager    Negative: Drooling or spitting out saliva (because can't swallow)    Negative: Unable to open mouth completely    Negative: Drinking very little and has signs of dehydration (e.g., no urine > 12 hours, very dry mouth, very lightheaded)    Negative: Patient sounds very sick or weak to the triager    Negative: Difficulty breathing (per caller) but not severe    Negative: Fever > 103 F (39.4 C)    Negative: Refuses to drink anything for > 12 hours    Negative: SEVERE sore throat pain    Negative: Pus on tonsils (back of throat) and swollen neck lymph nodes ('glands')    Protocols used: SORE THROAT-A-OH

## 2022-12-19 NOTE — PATIENT INSTRUCTIONS
At [facility], we strive to deliver an exceptional experience to you, every time we see you. If you receive a survey, please complete it as we do value your feedback.  If you have MyChart, you can expect to receive results automatically within 24 hours of their completion.  Your provider will send a note interpreting your results as well.   If you do not have MyChart, you should receive your results in about a week by mail.    Your care team:                            Family Medicine Internal Medicine   MD Hussain Albright MD Shantel Branch-Fleming, MD Srinivasa Vaka, MD Katya Belousova, PAKENNEDI Del Castillo CNP, MD ([facility]   Octavio Tabares, MD Sally Rojas MD Amelia Massimini APRN DEVIN Seo APRN MD Arianne Lopes MD          Clinic hours: Monday - Thursday 7 am-6 pm; Fridays 7 am-5 pm.   Urgent care: Monday - Friday 10 am- 8 pm; Saturday and Sunday 9 am-5 pm.    Clinic: [phone]       [facility]: Monday - Thursday 8 am - 7 pm; Friday 8 am - 6 pm  [facility]: [phone]

## 2023-01-11 ENCOUNTER — OFFICE VISIT (OUTPATIENT)
Dept: FAMILY MEDICINE | Facility: CLINIC | Age: 53
End: 2023-01-11
Payer: COMMERCIAL

## 2023-01-11 ENCOUNTER — ANCILLARY PROCEDURE (OUTPATIENT)
Dept: GENERAL RADIOLOGY | Facility: CLINIC | Age: 53
End: 2023-01-11
Attending: PREVENTIVE MEDICINE
Payer: COMMERCIAL

## 2023-01-11 VITALS
HEIGHT: 63 IN | HEART RATE: 80 BPM | TEMPERATURE: 97.9 F | WEIGHT: 153.4 LBS | RESPIRATION RATE: 16 BRPM | OXYGEN SATURATION: 100 % | SYSTOLIC BLOOD PRESSURE: 133 MMHG | DIASTOLIC BLOOD PRESSURE: 84 MMHG | BODY MASS INDEX: 27.18 KG/M2

## 2023-01-11 DIAGNOSIS — M79.641 BILATERAL HAND PAIN: ICD-10-CM

## 2023-01-11 DIAGNOSIS — H00.015 HORDEOLUM EXTERNUM LEFT LOWER EYELID: ICD-10-CM

## 2023-01-11 DIAGNOSIS — Z12.31 VISIT FOR SCREENING MAMMOGRAM: ICD-10-CM

## 2023-01-11 DIAGNOSIS — Z23 HIGH PRIORITY FOR 2019-NCOV VACCINE: ICD-10-CM

## 2023-01-11 DIAGNOSIS — R20.0 LEG NUMBNESS: ICD-10-CM

## 2023-01-11 DIAGNOSIS — Z23 ENCOUNTER FOR IMMUNIZATION: ICD-10-CM

## 2023-01-11 DIAGNOSIS — M79.642 BILATERAL HAND PAIN: ICD-10-CM

## 2023-01-11 DIAGNOSIS — K59.00 CONSTIPATION, UNSPECIFIED CONSTIPATION TYPE: ICD-10-CM

## 2023-01-11 DIAGNOSIS — Z00.00 ROUTINE HISTORY AND PHYSICAL EXAMINATION OF ADULT: Primary | ICD-10-CM

## 2023-01-11 DIAGNOSIS — E78.5 HYPERLIPIDEMIA LDL GOAL <70: ICD-10-CM

## 2023-01-11 DIAGNOSIS — M54.16 LUMBAR RADICULAR PAIN: ICD-10-CM

## 2023-01-11 LAB
ALBUMIN SERPL-MCNC: 4 G/DL (ref 3.4–5)
ALP SERPL-CCNC: 61 U/L (ref 40–150)
ALT SERPL W P-5'-P-CCNC: 35 U/L (ref 0–50)
ANION GAP SERPL CALCULATED.3IONS-SCNC: 4 MMOL/L (ref 3–14)
AST SERPL W P-5'-P-CCNC: 21 U/L (ref 0–45)
BILIRUB SERPL-MCNC: 0.5 MG/DL (ref 0.2–1.3)
BUN SERPL-MCNC: 9 MG/DL (ref 7–30)
CALCIUM SERPL-MCNC: 9.3 MG/DL (ref 8.5–10.1)
CHLORIDE BLD-SCNC: 106 MMOL/L (ref 94–109)
CHOLEST SERPL-MCNC: 120 MG/DL
CO2 SERPL-SCNC: 29 MMOL/L (ref 20–32)
CREAT SERPL-MCNC: 0.93 MG/DL (ref 0.52–1.04)
ERYTHROCYTE [DISTWIDTH] IN BLOOD BY AUTOMATED COUNT: 12.3 % (ref 10–15)
FASTING STATUS PATIENT QL REPORTED: YES
GFR SERPL CREATININE-BSD FRML MDRD: 75 ML/MIN/1.73M2
GLUCOSE BLD-MCNC: 93 MG/DL (ref 70–99)
HCT VFR BLD AUTO: 37.4 % (ref 35–47)
HDLC SERPL-MCNC: 50 MG/DL
HGB BLD-MCNC: 12.7 G/DL (ref 11.7–15.7)
LDLC SERPL CALC-MCNC: 56 MG/DL
MCH RBC QN AUTO: 31.4 PG (ref 26.5–33)
MCHC RBC AUTO-ENTMCNC: 34 G/DL (ref 31.5–36.5)
MCV RBC AUTO: 92 FL (ref 78–100)
NONHDLC SERPL-MCNC: 70 MG/DL
PLATELET # BLD AUTO: 273 10E3/UL (ref 150–450)
POTASSIUM BLD-SCNC: 3.9 MMOL/L (ref 3.4–5.3)
PROT SERPL-MCNC: 7.4 G/DL (ref 6.8–8.8)
RBC # BLD AUTO: 4.05 10E6/UL (ref 3.8–5.2)
SODIUM SERPL-SCNC: 139 MMOL/L (ref 133–144)
TRIGL SERPL-MCNC: 68 MG/DL
TSH SERPL DL<=0.005 MIU/L-ACNC: 0.69 MU/L (ref 0.4–4)
WBC # BLD AUTO: 4.1 10E3/UL (ref 4–11)

## 2023-01-11 PROCEDURE — 85027 COMPLETE CBC AUTOMATED: CPT | Performed by: PREVENTIVE MEDICINE

## 2023-01-11 PROCEDURE — 80061 LIPID PANEL: CPT | Performed by: PREVENTIVE MEDICINE

## 2023-01-11 PROCEDURE — 0134A COVID-19 VACCINE BIVALENT BOOSTER 18+ (MODERNA): CPT | Performed by: PREVENTIVE MEDICINE

## 2023-01-11 PROCEDURE — 99214 OFFICE O/P EST MOD 30 MIN: CPT | Mod: 25 | Performed by: PREVENTIVE MEDICINE

## 2023-01-11 PROCEDURE — 90714 TD VACC NO PRESV 7 YRS+ IM: CPT | Performed by: PREVENTIVE MEDICINE

## 2023-01-11 PROCEDURE — 90471 IMMUNIZATION ADMIN: CPT | Performed by: PREVENTIVE MEDICINE

## 2023-01-11 PROCEDURE — 80053 COMPREHEN METABOLIC PANEL: CPT | Performed by: PREVENTIVE MEDICINE

## 2023-01-11 PROCEDURE — 36415 COLL VENOUS BLD VENIPUNCTURE: CPT | Performed by: PREVENTIVE MEDICINE

## 2023-01-11 PROCEDURE — 91313 COVID-19 VACCINE BIVALENT BOOSTER 18+ (MODERNA): CPT | Performed by: PREVENTIVE MEDICINE

## 2023-01-11 PROCEDURE — 73130 X-RAY EXAM OF HAND: CPT | Mod: TC | Performed by: RADIOLOGY

## 2023-01-11 PROCEDURE — 99396 PREV VISIT EST AGE 40-64: CPT | Mod: 25 | Performed by: PREVENTIVE MEDICINE

## 2023-01-11 PROCEDURE — 82306 VITAMIN D 25 HYDROXY: CPT | Performed by: PREVENTIVE MEDICINE

## 2023-01-11 PROCEDURE — 84443 ASSAY THYROID STIM HORMONE: CPT | Performed by: PREVENTIVE MEDICINE

## 2023-01-11 RX ORDER — GABAPENTIN 100 MG/1
100-200 CAPSULE ORAL AT BEDTIME
Qty: 60 CAPSULE | Refills: 1 | Status: SHIPPED | OUTPATIENT
Start: 2023-01-11 | End: 2023-04-06

## 2023-01-11 RX ORDER — ERYTHROMYCIN 5 MG/G
0.5 OINTMENT OPHTHALMIC 2 TIMES DAILY
Qty: 3.5 G | Refills: 0 | Status: SHIPPED | OUTPATIENT
Start: 2023-01-11 | End: 2023-03-23

## 2023-01-11 RX ORDER — ATORVASTATIN CALCIUM 10 MG/1
10 TABLET, FILM COATED ORAL DAILY
Qty: 90 TABLET | Refills: 3 | Status: SHIPPED | OUTPATIENT
Start: 2023-01-11 | End: 2023-03-23 | Stop reason: SINTOL

## 2023-01-11 ASSESSMENT — ENCOUNTER SYMPTOMS
NAUSEA: 0
DIZZINESS: 0
BREAST MASS: 0
EYE PAIN: 1
SORE THROAT: 0
SHORTNESS OF BREATH: 0
COUGH: 0
NERVOUS/ANXIOUS: 0
CONSTIPATION: 0
HEADACHES: 0
ARTHRALGIAS: 1
FREQUENCY: 0
DIARRHEA: 0
HEMATURIA: 0
JOINT SWELLING: 0
PARESTHESIAS: 0
HEARTBURN: 0
PALPITATIONS: 0
HEMATOCHEZIA: 0
WEAKNESS: 0
CHILLS: 0
FEVER: 0
MYALGIAS: 0
DYSURIA: 0

## 2023-01-11 ASSESSMENT — PAIN SCALES - GENERAL: PAINLEVEL: MODERATE PAIN (5)

## 2023-01-11 NOTE — RESULT ENCOUNTER NOTE
Lashonda,     X rays of the hands are not showing any concerning bony changes.   Mild arthritis seen.  Plan of care and follow up as discussed in clinic.     Please do not hesitate to call us at [phone] if you have any questions or concerns.    Thank you,    Asmita Bhatia MD MPH

## 2023-01-11 NOTE — PROGRESS NOTES
SUBJECTIVE:   CC: Lashonda is an 52 year old who presents for preventive health visit.   Patient has been advised of split billing requirements and indicates understanding: Yes  Healthy Habits:     Getting at least 3 servings of Calcium per day:  NO    Bi-annual eye exam:  Yes    Dental care twice a year:  Yes    Sleep apnea or symptoms of sleep apnea:  None    Diet:  Regular (no restrictions)    Frequency of exercise:  1 day/week    Duration of exercise:  15-30 minutes    Taking medications regularly:  Yes    Medication side effects:  None    PHQ-2 Total Score: 1    Additional concerns today:  Yes      Has had rectal pain more at night  Has been seen by GI and needs to reschedule, being managed for chronic constipation  Colonoscopy is up to date   History of anal fissure  No rectal bleeding  Has been on Miralax and stools are better  With eating meat she needs to use enemas    History of chronic constipation  Was also advised pelvic physical therapy  Work and life in a better place and hopes to schedule Pelvic PT   Has used topical meds for fissure       Left eye bump, increased drainage for a week. Bump inside the left eye lower lid      Pain in the left hand, right hand and left foot. Sudden pain onset for a month.  Has used biofreeze, No tingling  Has felt weakness  No rash  No swelling  No stiffness  Fingers hurt  Happens anytime   No trauma  No heavy lifting on that side  Is right hand dominant  Symptoms increasing   Does do typing     Has not done her sciatic nerve shot  Would like to use home remedies  Keeps elevated  Gabapentin made her sleepy     Taking Lipitor daily, no myalgias     The ASCVD Risk score (Debbie DK, et al., 2019) failed to calculate for the following reasons:    The valid total cholesterol range is 130 to 320 mg/dL    Today's PHQ-2 Score:   PHQ-2 ( 1999 Pfizer) 1/11/2023   Q1: Little interest or pleasure in doing things 1   Q2: Feeling down, depressed or hopeless 0   PHQ-2 Score 1    PHQ-2 Total Score (12-17 Years)- Positive if 3 or more points; Administer PHQ-A if positive -   Q1: Little interest or pleasure in doing things Several days   Q2: Feeling down, depressed or hopeless Not at all   PHQ-2 Score 1       Have you ever done Advance Care Planning? (For example, a Health Directive, POLST, or a discussion with a medical provider or your loved ones about your wishes): No, advance care planning information given to patient to review.  Advanced care planning was discussed at today's visit.    Social History     Tobacco Use     Smoking status: Never     Smokeless tobacco: Never   Substance Use Topics     Alcohol use: No     Alcohol/week: 0.0 standard drinks         Alcohol Use 1/11/2023   Prescreen: >3 drinks/day or >7 drinks/week? Not Applicable   Prescreen: >3 drinks/day or >7 drinks/week? -       Reviewed orders with patient.  Reviewed health maintenance and updated orders accordingly - Yes  Lab work is in process  Labs reviewed in EPIC  BP Readings from Last 3 Encounters:   01/11/23 133/84   12/19/22 120/75   08/11/22 131/83    Wt Readings from Last 3 Encounters:   01/11/23 69.6 kg (153 lb 6.4 oz)   12/19/22 67.6 kg (149 lb)   08/11/22 67.4 kg (148 lb 9.6 oz)                  Patient Active Problem List   Diagnosis     Tension headache     Migraine headache     Adjustment disorder with depressed mood     Seasonal allergic rhinitis, unspecified allergic rhinitis trigger     TBI (traumatic brain injury), without LOC, sequela     Adnexal cyst     Low back pain     Lumbago with sciatica, left side     Hyperlipidemia LDL goal <70     Past Surgical History:   Procedure Laterality Date     BIOPSY BREAST Right 6/22/2016    Procedure: BIOPSY BREAST;  Surgeon: Kaiser Roy MD;  Location: [facility]     COLONOSCOPY WITH CO2 INSUFFLATION N/A 4/4/2019    Procedure: COLONOSCOPY WITH CO2 INSUFFLATION;  Surgeon: Moise Ingram MD;  Location: [facility]     HYSTERECTOMY, PAP NO LONGER INDICATED        SKIN GRAFT, EACH ADDN 100SQCM  1977    buttocks to left  foot     SURGICAL HISTORY OF -   2004    L wrist tendon     ZZC TOTAL ABDOM HYSTERECTOMY  06/11/2013    benign fibroids     ZZC TREAT ECTOPIC PREG,RMV TUBE/OVARY  1995       Social History     Tobacco Use     Smoking status: Never     Smokeless tobacco: Never   Substance Use Topics     Alcohol use: No     Alcohol/week: 0.0 standard drinks     Family History   Problem Relation Age of Onset     Diabetes Mother      Diabetes Maternal Grandmother      Hypertension Maternal Grandmother      Arthritis Maternal Grandmother      Cancer Maternal Grandmother         bladder cancer/cervix     Cancer Maternal Grandfather         bone     Asthma Son      Unknown/Adopted Paternal Grandfather      Unknown/Adopted Paternal Grandmother      Cervical Cancer Maternal Aunt          Current Outpatient Medications   Medication Sig Dispense Refill     atorvastatin (LIPITOR) 10 MG tablet Take 1 tablet (10 mg) by mouth daily 90 tablet 3     erythromycin (ROMYCIN) 5 MG/GM ophthalmic ointment Place 0.5 inches into both eyes 2 times daily 3.5 g 0     gabapentin (NEURONTIN) 100 MG capsule Take 1-2 capsules (100-200 mg) by mouth At Bedtime 60 capsule 1     fluticasone (FLONASE) 50 MCG/ACT nasal spray Spray 1-2 sprays into both nostrils daily 16 g 0     metoclopramide (REGLAN) 10 MG tablet Take 1 tablet (10 mg) by mouth 2 times daily as needed (migraine or nausea) 20 tablet 1     Multiple Vitamins-Minerals (HAIR SKIN NAILS) CAPS        naproxen (NAPROSYN) 500 MG tablet Take 1 tablet (500 mg) by mouth 2 times daily (with meals) 20 tablet 1     polyethylene glycol (MIRALAX) 17 GM/Dose powder Take 17 g (1 capful) by mouth 2 times daily 289 g 3     Probiotic Product (PROBIOTIC DAILY PO) Take 2 tablets by mouth daily        SUMAtriptan (IMITREX) 100 MG tablet Take 1 tablet (100 mg) by mouth at onset of headache for migraine May repeat in 2 hours if needed: max 2/day 18 tablet 1     Allergies    Allergen Reactions     Medrol [Methylprednisolone] Itching     Promethazine Itching     Vicodin [Hydrocodone-Acetaminophen]      itchy       Breast Cancer Screening:    FHS-7:   Breast CA Risk Assessment (FHS-7) 2/1/2022   Did any of your first-degree relatives have breast or ovarian cancer? No   Did any of your relatives have bilateral breast cancer? No   Did any man in your family have breast cancer? No   Did any woman in your family have breast and ovarian cancer? No   Did any woman in your family have breast cancer before age 50 y? No   Do you have 2 or more relatives with breast and/or ovarian cancer? No   Do you have 2 or more relatives with breast and/or bowel cancer? No       Mammogram Screening: Recommended annual mammography  Pertinent mammograms are reviewed under the imaging tab.    History of abnormal Pap smear: Status post benign hysterectomy. Health Maintenance and Surgical History updated.  PAP / HPV 9/4/2012 10/16/2009   PAP (Historical) NIL NIL     Reviewed and updated as needed this visit by clinical staff   Tobacco  Allergies  Meds  Problems  Med Hx  Surg Hx  Fam Hx          Reviewed and updated as needed this visit by Provider   Tobacco  Allergies  Meds  Problems  Med Hx  Surg Hx  Fam Hx         Past Medical History:   Diagnosis Date     Adjustment disorder with depressed mood 1/19/2016     Breast fibroadenoma, right 2016    excised     Complication of anesthesia     nausea/ use scop. patches     H/O: hysterectomy 2013     Hypothyroidism 2005    Now resolved     Leiomyoma of uterus, unspecified 2013    resolved     Menorrhagia 2000    resolved     Migraine headache 10/2/2012     Recurrent UTI 2012     TBI (traumatic brain injury), without LOC, sequela 9/12/2017     Vitamin B 12 deficiency 2005    Resolved      Past Surgical History:   Procedure Laterality Date     BIOPSY BREAST Right 6/22/2016    Procedure: BIOPSY BREAST;  Surgeon: Kaiser Roy MD;  [facility]      "COLONOSCOPY WITH CO2 INSUFFLATION N/A 4/4/2019    Procedure: COLONOSCOPY WITH CO2 INSUFFLATION;  Surgeon: Moise Ingram MD;  Location: [facility]     HYSTERECTOMY, PAP NO LONGER INDICATED       SKIN GRAFT, EACH ADDN 100SQCM  1977    buttocks to left  foot     SURGICAL HISTORY OF -   2004    L wrist tendon     ZZC TOTAL ABDOM HYSTERECTOMY  06/11/2013    benign fibroids     ZZC TREAT ECTOPIC PREG,RMV TUBE/OVARY  1995       Review of Systems   Constitutional: Negative for chills and fever.   HENT: Negative for congestion, ear pain, hearing loss and sore throat.    Eyes: Positive for pain. Negative for visual disturbance.   Respiratory: Negative for cough and shortness of breath.    Cardiovascular: Negative for chest pain, palpitations and peripheral edema.   Gastrointestinal: Negative for constipation, diarrhea, heartburn, hematochezia and nausea.   Breasts:  Negative for tenderness, breast mass and discharge.   Genitourinary: Negative for dysuria, frequency, genital sores, hematuria, pelvic pain, urgency, vaginal bleeding and vaginal discharge.   Musculoskeletal: Positive for arthralgias. Negative for joint swelling and myalgias.   Skin: Negative for rash.   Neurological: Negative for dizziness, weakness, headaches and paresthesias.   Psychiatric/Behavioral: Negative for mood changes. The patient is not nervous/anxious.         OBJECTIVE:   /84 (BP Location: Left arm, Patient Position: Chair, Cuff Size: Adult Regular)   Pulse 80   Temp 97.9  F (36.6  C) (Tympanic)   Resp 16   Ht 1.594 m (5' 2.75\")   Wt 69.6 kg (153 lb 6.4 oz)   LMP 06/10/2013 (Exact Date)   SpO2 100%   BMI 27.39 kg/m    Physical Exam  GENERAL APPEARANCE: healthy, alert and no distress  EYES: Eyes grossly normal to inspection and conjunctivae and sclerae normal, small bump noted on the left lower lid   HENT: nose and mouth without ulcers or lesions  NECK: no adenopathy and trachea midline and normal to palpation  RESP: lungs clear to " auscultation - no rales, rhonchi or wheezes  CV: regular rates and rhythm, normal S1 S2, no S3 or S4 and no murmur, click or rub  ABDOMEN: soft, non-tender and no rebound or guarding   MS: extremities normal- no gross deformities noted and peripheral pulses normal  SKIN: no suspicious lesions or rashes  NEURO: Normal strength and tone, mentation intact and speech normal  PSYCH: mentation appears normal      Diagnostic Test Results:  Labs reviewed in Epic  No results found for this or any previous visit (from the past 24 hour(s)).    ASSESSMENT/PLAN:   Lashonda was seen today for physical and imm/inj.    Diagnoses and all orders for this visit:    Routine history and physical examination of adult  -preventive guidelines reviewed    Visit for screening mammogram  -     MA SCREENING DIGITAL BILAT - Future  (s+30); Future    Hyperlipidemia LDL goal <70  -     atorvastatin (LIPITOR) 10 MG tablet; Take 1 tablet (10 mg) by mouth daily  -     Comprehensive metabolic panel (BMP + Alb, Alk Phos, ALT, AST, Total. Bili, TP); Future  -     Lipid panel reflex to direct LDL Fasting; Future  -Mild atherosclerotic calcification of the aortic arch seen on CT Chest done in 12/21.     LDL Cholesterol Calculated   Date Value Ref Range Status   08/11/2022 51 <=100 mg/dL Final   11/21/2020 89 <100 mg/dL Final     Comment:     Desirable:       <100 mg/dl         Leg numbness  -     gabapentin (NEURONTIN) 100 MG capsule; Take 1-2 capsules (100-200 mg) by mouth At Bedtime  -     Comprehensive metabolic panel (BMP + Alb, Alk Phos, ALT, AST, Total. Bili, TP); Future  -use being limited by sedation side effect     Lumbar radicular pain  -     gabapentin (NEURONTIN) 100 MG capsule; Take 1-2 capsules (100-200 mg) by mouth At Bedtime  -     CBC with platelets; Future  -     Comprehensive metabolic panel (BMP + Alb, Alk Phos, ALT, AST, Total. Bili, TP); Future    Hordeolum externum left lower eyelid  -     erythromycin (ROMYCIN) 5 MG/GM ophthalmic  "ointment; Place 0.5 inches into both eyes 2 times daily  -Maria Luz eye masks over the counter     Bilateral hand pain  -     XR Hand Bilateral G/E 3 Views; Future  -     CBC with platelets; Future  -     Comprehensive metabolic panel (BMP + Alb, Alk Phos, ALT, AST, Total. Bili, TP); Future  -     Vitamin D Deficiency; Future  -await labs and imaging, further plan to be determined then  -will use over the counter Voltaren gel  -differentials considered include tendonitis, osteoarthritis.     Constipation, unspecified constipation type  -     CBC with platelets; Future  -     Comprehensive metabolic panel (BMP + Alb, Alk Phos, ALT, AST, Total. Bili, TP); Future  -     TSH with free T4 reflex; Future  -being followed by GI  -colonoscopy up to date  -following recommendations by GI   -last saw GI 3/22  -due for follow up and also plans to set up Pelvic Physical therapy     High priority for 2019-nCoV vaccine  -     COVID-19,PF,MODERNA BIVALENT 18+Yrs    Encounter for immunization  -     TD PRESERV FREE, IM (7+ YRS) (DECAVAC/TENIVAC)    COUNSELING:  Reviewed preventive health counseling, as reflected in patient instructions       Regular exercise       Healthy diet/nutrition       Vision screening       Immunizations    Vaccinated for: Td            BMI:   Estimated body mass index is 27.39 kg/m  as calculated from the following:    Height as of this encounter: 1.594 m (5' 2.75\").    Weight as of this encounter: 69.6 kg (153 lb 6.4 oz).   Weight management plan: Discussed healthy diet and exercise guidelines      She reports that she has never smoked. She has never used smokeless tobacco.          Asmita Bhatia MD MPH    [facility]  "

## 2023-01-11 NOTE — RESULT ENCOUNTER NOTE
Lashonda,     Basic blood count is not showing anemia or infection.  Other labs are pending.     Please do not hesitate to call us at [phone] if you have any questions or concerns.    Thank you,    Asmita Bhatia MD MPH

## 2023-01-11 NOTE — PATIENT INSTRUCTIONS
VOLTAREN GEL (DICLOFENAC) OVER THE COUNTER APPLY TO AREAS OF PAIN    Maria Luz eye masks        At [facility], we strive to deliver an exceptional experience to you, every time we see you. If you receive a survey, please complete it as we do value your feedback.  If you have MyChart, you can expect to receive results automatically within 24 hours of their completion.  Your provider will send a note interpreting your results as well.   If you do not have MyChart, you should receive your results in about a week by mail.    Your care team:                            Family Medicine Internal Medicine   MD Hussain Albright MD Shantel Branch-Fleming, MD Srinivasa Vaka, MD Katya Belousova, LIDIA MastersHill) KENNEDI Galloway CNP, MD Pediatrics   Octavio Tabares, MD Sally Rojas MD Amelia Massimini APRN DEVIN Seo APRN MD Arianne Lopes MD          Clinic hours: Monday - Thursday 7 am-6 pm; Fridays 7 am-5 pm.   Urgent care: Monday - Friday 10 am- 8 pm; Saturday and Sunday 9 am-5 pm.    Clinic: [phone]       [facility]: Monday - Thursday 8 am - 7 pm; Friday 8 am - 6 pm  [facility]: [phone]

## 2023-01-11 NOTE — NURSING NOTE
Prior to immunization administration, verified patients identity using patient s name and date of birth. Please see Immunization Activity for additional information.     Screening Questionnaire for Adult Immunization    Are you sick today?   No   Do you have allergies to medications, food, a vaccine component or latex?   Yes   Have you ever had a serious reaction after receiving a vaccination?   No   Do you have a long-term health problem with heart, lung, kidney, or metabolic disease (e.g., diabetes), asthma, a blood disorder, no spleen, complement component deficiency, a cochlear implant, or a spinal fluid leak?  Are you on long-term aspirin therapy?   No   Do you have cancer, leukemia, HIV/AIDS, or any other immune system problem?   No   Do you have a parent, brother, or sister with an immune system problem?   No   In the past 3 months, have you taken medications that affect  your immune system, such as prednisone, other steroids, or anticancer drugs; drugs for the treatment of rheumatoid arthritis, Crohn s disease, or psoriasis; or have you had radiation treatments?   No   Have you had a seizure, or a brain or other nervous system problem?   No   During the past year, have you received a transfusion of blood or blood    products, or been given immune (gamma) globulin or antiviral drug?   No   For women: Are you pregnant or is there a chance you could become       pregnant during the next month?   No   Have you received any vaccinations in the past 4 weeks?   No     Immunization questionnaire was positive for at least one answer.  Notified provider aware.        Patient instructed to remain in clinic for 15 minutes afterwards, and to report any adverse reaction to me immediately.       Screening performed by Dionne Rice MA on 1/11/2023 at 9:58 AM.

## 2023-01-12 LAB — DEPRECATED CALCIDIOL+CALCIFEROL SERPL-MC: 34 UG/L (ref 20–75)

## 2023-01-12 NOTE — RESULT ENCOUNTER NOTE
Lashonda,     Thyroid function, electrolytes, glucose, kidney function and liver function tests are normal.   Cholesterol is at goal for you.     Please do not hesitate to call us at [phone] if you have any questions or concerns.    Thank you,    Asmita Bhatia MD MPH

## 2023-01-12 NOTE — RESULT ENCOUNTER NOTE
Lashonda,     Vitamin D levels are normal.     Please do not hesitate to call us at [phone] if you have any questions or concerns.    Thank you,    Asmita Bhatia MD MPH

## 2023-02-07 ENCOUNTER — ANCILLARY PROCEDURE (OUTPATIENT)
Dept: MAMMOGRAPHY | Facility: CLINIC | Age: 53
End: 2023-02-07
Attending: PREVENTIVE MEDICINE
Payer: COMMERCIAL

## 2023-02-07 DIAGNOSIS — Z12.31 VISIT FOR SCREENING MAMMOGRAM: ICD-10-CM

## 2023-02-07 PROCEDURE — 77067 SCR MAMMO BI INCL CAD: CPT | Performed by: RADIOLOGY

## 2023-02-07 PROCEDURE — 77063 BREAST TOMOSYNTHESIS BI: CPT | Performed by: RADIOLOGY

## 2023-03-20 ENCOUNTER — MYC MEDICAL ADVICE (OUTPATIENT)
Dept: FAMILY MEDICINE | Facility: CLINIC | Age: 53
End: 2023-03-20
Payer: COMMERCIAL

## 2023-03-21 NOTE — TELEPHONE ENCOUNTER
Called patient and left message asking to call clinic back to go over urgent provider message. Will also send over MyC.      SIOBHAN LawsonN, RN  [facility]

## 2023-03-21 NOTE — TELEPHONE ENCOUNTER
OK to hold the Atorvastatin and schedule a Video visit with any available provider. Agree with ED evaluation for unilateral leg swelling.   Thank you,  Asmita Bhatia MD MPH

## 2023-03-22 NOTE — TELEPHONE ENCOUNTER
"Called patient to relay provider message below, patient verbalized understanding. Video visit scheduled for tomorrow with PCP.    Patient also made aware of provider recommendation for ED eval. Patient states that she's been dealing with this one sided swelling for \"years\" and while it's been worse lately, she thinks this is due to not getting her yearly cortisone shot (she has been trying to hold off) for her sciatica. Patient states her swelling gets worse the longer she goes without the cortisone shot. No redness or hard areas present. However patient states her left leg feels like \"dead weight\", especially at night. Endorses moderate pain with little relief in OTC meds, states that parts of her leg are usually numb anyways.    Recommended that patient still be evaluated in ED if agreeable, as we cannot rule out a blood clot without imaging. Patient verbalized understanding, stating she will think about it, as she has been evaluated in ED in the past for leg pain/swelling and they gave her lidocaine patches after ruling her out for DVT. Patient aware of ED recommendation. No further questions or concerns.      Gayle Krishna, SIOBHANN, RN  [facility]    "

## 2023-03-23 ENCOUNTER — VIRTUAL VISIT (OUTPATIENT)
Dept: FAMILY MEDICINE | Facility: CLINIC | Age: 53
End: 2023-03-23
Payer: COMMERCIAL

## 2023-03-23 DIAGNOSIS — M51.16 LUMBAR DISC HERNIATION WITH RADICULOPATHY: ICD-10-CM

## 2023-03-23 DIAGNOSIS — M79.10 MYALGIA: Primary | ICD-10-CM

## 2023-03-23 PROCEDURE — 99214 OFFICE O/P EST MOD 30 MIN: CPT | Mod: VID | Performed by: PREVENTIVE MEDICINE

## 2023-03-23 NOTE — PROGRESS NOTES
Lashonda is a 52 year old who is being evaluated via a billable video visit.      How would you like to obtain your AVS? MyChart  If the video visit is dropped, the invitation should be resent by: Text to cell phone: [phone]  Will anyone else be joining your video visit? No          Assessment & Plan     Myalgia  -resolved fully after stopping statin Atorvastatin  -defer additional statins for now  -recheck labs in 6 months    LDL Cholesterol Calculated   Date Value Ref Range Status   01/11/2023 56 <=100 mg/dL Final   11/21/2020 89 <100 mg/dL Final     Comment:     Desirable:       <100 mg/dl         The ASCVD Risk score (Debbie DK, et al., 2019) failed to calculate for the following reasons:    The valid total cholesterol range is 130 to 320 mg/dL    Lumbar disc herniation with radiculopathy  -has had good results with [facility] in the past  - XR Lumbar Epidural Injection Incl Imaging      Ordering of each unique test  22 minutes spent on the date of the encounter doing chart review, history and exam, documentation and further activities per the note         Asmita Bhatia MD MPH    [facility]   Lashonda is a 52 year old, presenting for the following health issues:  Tailbone Pain  No flowsheet data found.  History of Present Illness       Reason for visit:  Bone Pain  Symptom onset:  3-4 weeks ago  Symptoms include:  Aching in legs and arms, swelling in lower left leg and foot (some numbness)  Symptom intensity:  Moderate  Symptom progression:  Worsening  What makes it worse:  Nightime  What makes it better:  Biofreeze    She eats 2-3 servings of fruits and vegetables daily.She consumes 0 sweetened beverage(s) daily.She exercises with enough effort to increase her heart rate 9 or less minutes per day.  She exercises with enough effort to increase her heart rate 3 or less days per week.   She is taking medications regularly.     Bad aching and pain  Internal  "aching  Swelling in the leg with the sciatic nerve is involved  No redness  NO chest pain   No shortness of breath  These symptoms started a little after starting the cholesterol medication  No fever  No rash  Worse at night, when sleeping.  Has been trying to elevate her legs  Stopped statin for 2 days and symptoms have resolved fully    Per My chart message:    \"I have been taking Atorvastatin and l am starting to have side effects of aching in my legs. I almost feel like my bones hurt. Is there another low does medication for cholesterol.\"    Would like to get LESI, has had in the past and has helped symptoms.   Last done 12/17/21  MRI lumbar done 8/2020  The patient does not have any focal weakness or numbness, no urine or stool incontinence, no hematuria, no saddle anesthesia and no gait abnormalities.         Review of Systems   Constitutional, HEENT, cardiovascular, pulmonary, gi and gu systems are negative, except as otherwise noted.      Objective    Vitals - Patient Reported  Pain Score: Extreme Pain (8)  Pain Loc: Foot      Vitals:  No vitals were obtained today due to virtual visit.    Physical Exam   GENERAL: Healthy, alert and no distress  EYES: Eyes grossly normal to inspection.  No discharge or erythema, or obvious scleral/conjunctival abnormalities.  RESP: No audible wheeze, cough, or visible cyanosis.  No visible retractions or increased work of breathing.    SKIN: Visible skin clear. No significant rash, abnormal pigmentation or lesions.  NEURO: Cranial nerves grossly intact.  Mentation and speech appropriate for age.  PSYCH: Mentation appears normal, affect normal/bright, judgement and insight intact, normal speech     Video-Visit Details    Type of service:  Video Visit     Originating Location (pt. Location): Home    Distant Location (provider location):  Off-site  Platform used for Video Visit: Sindi"

## 2023-04-06 ENCOUNTER — OFFICE VISIT (OUTPATIENT)
Dept: URGENT CARE | Facility: URGENT CARE | Age: 53
End: 2023-04-06
Payer: COMMERCIAL

## 2023-04-06 VITALS
SYSTOLIC BLOOD PRESSURE: 113 MMHG | DIASTOLIC BLOOD PRESSURE: 76 MMHG | OXYGEN SATURATION: 100 % | HEART RATE: 82 BPM | WEIGHT: 153 LBS | BODY MASS INDEX: 27.32 KG/M2 | TEMPERATURE: 97 F

## 2023-04-06 DIAGNOSIS — B34.9 VIRAL SYNDROME: Primary | ICD-10-CM

## 2023-04-06 DIAGNOSIS — J02.9 SORE THROAT: ICD-10-CM

## 2023-04-06 LAB
DEPRECATED S PYO AG THROAT QL EIA: NEGATIVE
GROUP A STREP BY PCR: NOT DETECTED

## 2023-04-06 PROCEDURE — 87651 STREP A DNA AMP PROBE: CPT | Performed by: PHYSICIAN ASSISTANT

## 2023-04-06 PROCEDURE — 99213 OFFICE O/P EST LOW 20 MIN: CPT | Performed by: PHYSICIAN ASSISTANT

## 2023-04-06 ASSESSMENT — ENCOUNTER SYMPTOMS
NECK PAIN: 0
DIZZINESS: 0
JOINT SWELLING: 0
BACK PAIN: 0
CHILLS: 0
DIARRHEA: 0
EYES NEGATIVE: 1
CARDIOVASCULAR NEGATIVE: 1
MYALGIAS: 0
NECK STIFFNESS: 0
COUGH: 0
RHINORRHEA: 0
WEAKNESS: 0
ALLERGIC/IMMUNOLOGIC NEGATIVE: 1
SHORTNESS OF BREATH: 0
MUSCULOSKELETAL NEGATIVE: 1
WOUND: 0
ENDOCRINE NEGATIVE: 1
NAUSEA: 0
HEADACHES: 0
ARTHRALGIAS: 0
LIGHT-HEADEDNESS: 0
FEVER: 0
VOMITING: 0
SORE THROAT: 1
PALPITATIONS: 0
RESPIRATORY NEGATIVE: 1

## 2023-04-06 NOTE — PROGRESS NOTES
Chief Complaint:     Chief Complaint   Patient presents with     Pharyngitis     Sore left side along with ear     Results for orders placed or performed in visit on 04/06/23   Streptococcus A Rapid Screen w/Reflex to PCR - Clinic Collect     Status: Normal    Specimen: Throat; Swab   Result Value Ref Range    Group A Strep antigen Negative Negative       Medical Decision Making:    Vital signs reviewed by Paolo Magaña PA-C  /76   Pulse 82   Temp 97  F (36.1  C) (Tympanic)   Wt 69.4 kg (153 lb)   LMP 06/10/2013 (Exact Date)   SpO2 100%   BMI 27.32 kg/m      Differential Diagnosis:  URI Adult/Peds:  Acute left otitis media, Strep pharyngitis, Viral pharyngitis and Viral upper respiratory illness        ASSESSMENT    1. Viral syndrome    2. Sore throat        PLAN    Patient is in no acute distress.    Temp is 97 in clinic today, lung sounds were clear, and O2 sats at 100% on RA.    RST was negative.  We will call with PCR results only if positive.  Rest, Push fluids, vaporizer, elevation of head of bed.  Ibuprofen and or Tylenol for any fever or body aches.  If symptoms worsen, recheck immediately otherwise follow up with your PCP in 1 week if symptoms are not improving.  Worrisome symptoms discussed with instructions to go to the ED.  Patient verbalized understanding and agreed with this plan.    Labs:    Results for orders placed or performed in visit on 04/06/23   Streptococcus A Rapid Screen w/Reflex to PCR - Clinic Collect     Status: Normal    Specimen: Throat; Swab   Result Value Ref Range    Group A Strep antigen Negative Negative        Vital signs reviewed by Paolo Magaña PA-C  /76   Pulse 82   Temp 97  F (36.1  C) (Tympanic)   Wt 69.4 kg (153 lb)   LMP 06/10/2013 (Exact Date)   SpO2 100%   BMI 27.32 kg/m      Current Meds      Current Outpatient Medications:      fluticasone (FLONASE) 50 MCG/ACT nasal spray, Spray 1-2 sprays into both nostrils daily, Disp: 16 g, Rfl: 0      Multiple Vitamins-Minerals (HAIR SKIN NAILS) CAPS, , Disp: , Rfl:      polyethylene glycol (MIRALAX) 17 GM/Dose powder, Take 17 g (1 capful) by mouth 2 times daily, Disp: 289 g, Rfl: 3     Probiotic Product (PROBIOTIC DAILY PO), Take 2 tablets by mouth daily , Disp: , Rfl:       Respiratory History    no history of pneumonia or bronchitis      SUBJECTIVE    HPI: Lashonda Rivera is an 52 year old female who presents with ear pain left, sore throat and swollen glands.  Symptoms began 1 day ago and have been stable.  There is no shortness of breath, wheezing and chest pain.  Patient is eating and drinking well.  No fever, nausea, vomiting, or diarrhea.    Patient denies any recent travel or exposure to known COVID positive tested individual.      ROS:     Review of Systems   Constitutional: Negative for chills and fever.   HENT: Positive for ear pain and sore throat. Negative for congestion and rhinorrhea.    Eyes: Negative.    Respiratory: Negative.  Negative for cough and shortness of breath.    Cardiovascular: Negative.  Negative for chest pain and palpitations.   Gastrointestinal: Negative for diarrhea, nausea and vomiting.   Endocrine: Negative.    Genitourinary: Negative.    Musculoskeletal: Negative.  Negative for arthralgias, back pain, joint swelling, myalgias, neck pain and neck stiffness.   Skin: Negative.  Negative for rash and wound.   Allergic/Immunologic: Negative.  Negative for immunocompromised state.   Neurological: Negative for dizziness, weakness, light-headedness and headaches.         Family History   Family History   Problem Relation Age of Onset     Diabetes Mother      Diabetes Maternal Grandmother      Hypertension Maternal Grandmother      Arthritis Maternal Grandmother      Cancer Maternal Grandmother         bladder cancer/cervix     Cancer Maternal Grandfather         bone     Asthma Son      Unknown/Adopted Paternal Grandfather      Unknown/Adopted Paternal Grandmother      Cervical  Cancer Maternal Aunt         Problem history  Patient Active Problem List   Diagnosis     Tension headache     Migraine headache     Adjustment disorder with depressed mood     Seasonal allergic rhinitis, unspecified allergic rhinitis trigger     TBI (traumatic brain injury), without LOC, sequela     Adnexal cyst     Low back pain     Lumbago with sciatica, left side     Hyperlipidemia LDL goal <70        Allergies  Allergies   Allergen Reactions     Atorvastatin Muscle Pain (Myalgia)     Medrol [Methylprednisolone] Itching     Promethazine Itching     Vicodin [Hydrocodone-Acetaminophen]      itchy        Social History  Social History     Socioeconomic History     Marital status:      Spouse name: no partner     Number of children: 2     Years of education: Not on file     Highest education level: Not on file   Occupational History     Occupation:    Tobacco Use     Smoking status: Never     Smokeless tobacco: Never   Vaping Use     Vaping status: Never Used   Substance and Sexual Activity     Alcohol use: No     Alcohol/week: 0.0 standard drinks of alcohol     Drug use: No     Sexual activity: Yes     Partners: Male     Birth control/protection: Female Surgical   Other Topics Concern     Parent/sibling w/ CABG, MI or angioplasty before 65F 55M? Yes   Social History Narrative     Not on file     Social Determinants of Health     Financial Resource Strain: Not on file   Food Insecurity: Not on file   Transportation Needs: Not on file   Physical Activity: Not on file   Stress: Not on file   Social Connections: Not on file   Intimate Partner Violence: Not on file   Housing Stability: Not on file        OBJECTIVE     Vital signs reviewed by Paolo Magaña PA-C  /76   Pulse 82   Temp 97  F (36.1  C) (Tympanic)   Wt 69.4 kg (153 lb)   LMP 06/10/2013 (Exact Date)   SpO2 100%   BMI 27.32 kg/m       Physical Exam  Vitals and nursing note reviewed.   Constitutional:       General: She is not  in acute distress.     Appearance: She is well-developed. She is not ill-appearing, toxic-appearing or diaphoretic.   HENT:      Head: Normocephalic and atraumatic.      Right Ear: Hearing, tympanic membrane, ear canal and external ear normal. Tympanic membrane is not perforated, erythematous, retracted or bulging.      Left Ear: Hearing, tympanic membrane, ear canal and external ear normal. Tympanic membrane is not perforated, erythematous, retracted or bulging.      Nose: No mucosal edema, congestion or rhinorrhea.      Right Sinus: No maxillary sinus tenderness or frontal sinus tenderness.      Left Sinus: No maxillary sinus tenderness or frontal sinus tenderness.      Mouth/Throat:      Pharynx: No pharyngeal swelling, oropharyngeal exudate, posterior oropharyngeal erythema or uvula swelling.      Tonsils: No tonsillar exudate or tonsillar abscesses. 0 on the right. 0 on the left.   Eyes:      General:         Right eye: No discharge.         Left eye: No discharge.      Pupils: Pupils are equal, round, and reactive to light.   Cardiovascular:      Rate and Rhythm: Normal rate and regular rhythm.      Heart sounds: Normal heart sounds. No murmur heard.     No friction rub. No gallop.   Pulmonary:      Effort: Pulmonary effort is normal. No respiratory distress.      Breath sounds: Normal breath sounds. No decreased breath sounds, wheezing, rhonchi or rales.   Chest:      Chest wall: No tenderness.   Abdominal:      General: Bowel sounds are normal. There is no distension.      Palpations: Abdomen is soft. There is no mass.      Tenderness: There is no abdominal tenderness. There is no guarding.   Musculoskeletal:      Cervical back: Normal range of motion and neck supple.   Lymphadenopathy:      Head:      Right side of head: No submental, submandibular, tonsillar, preauricular or posterior auricular adenopathy.      Left side of head: No submental, submandibular, tonsillar, preauricular or posterior auricular  adenopathy.      Cervical: No cervical adenopathy.      Right cervical: No superficial or posterior cervical adenopathy.     Left cervical: No superficial or posterior cervical adenopathy.   Skin:     General: Skin is warm and dry.      Findings: No rash.   Neurological:      Mental Status: She is alert and oriented to person, place, and time.      Cranial Nerves: No cranial nerve deficit.      Deep Tendon Reflexes: Reflexes are normal and symmetric.   Psychiatric:         Behavior: Behavior normal. Behavior is cooperative.         Thought Content: Thought content normal.         Judgment: Judgment normal.           Paolo Magaña PA-C  4/6/2023, 1:41 PM

## 2023-05-12 ENCOUNTER — ANCILLARY PROCEDURE (OUTPATIENT)
Dept: GENERAL RADIOLOGY | Facility: CLINIC | Age: 53
End: 2023-05-12
Attending: PREVENTIVE MEDICINE
Payer: COMMERCIAL

## 2023-05-12 DIAGNOSIS — M51.16 LUMBAR DISC HERNIATION WITH RADICULOPATHY: ICD-10-CM

## 2023-05-12 PROCEDURE — 62323 NJX INTERLAMINAR LMBR/SAC: CPT | Performed by: RADIOLOGY

## 2023-05-12 RX ORDER — BUPIVACAINE HYDROCHLORIDE 5 MG/ML
5 INJECTION, SOLUTION EPIDURAL; INTRACAUDAL ONCE
Status: COMPLETED | OUTPATIENT
Start: 2023-05-12 | End: 2023-05-12

## 2023-05-12 RX ORDER — IOPAMIDOL 408 MG/ML
10 INJECTION, SOLUTION INTRATHECAL ONCE
Status: COMPLETED | OUTPATIENT
Start: 2023-05-12 | End: 2023-05-12

## 2023-05-12 RX ORDER — METHYLPREDNISOLONE ACETATE 80 MG/ML
80 INJECTION, SUSPENSION INTRA-ARTICULAR; INTRALESIONAL; INTRAMUSCULAR; SOFT TISSUE ONCE
Status: COMPLETED | OUTPATIENT
Start: 2023-05-12 | End: 2023-05-12

## 2023-05-12 RX ADMIN — BUPIVACAINE HYDROCHLORIDE 10 MG: 5 INJECTION, SOLUTION EPIDURAL; INTRACAUDAL at 08:27

## 2023-05-12 RX ADMIN — IOPAMIDOL 2 ML: 408 INJECTION, SOLUTION INTRATHECAL at 08:27

## 2023-05-12 RX ADMIN — METHYLPREDNISOLONE ACETATE 80 MG: 80 INJECTION, SUSPENSION INTRA-ARTICULAR; INTRALESIONAL; INTRAMUSCULAR; SOFT TISSUE at 08:27

## 2023-05-31 ENCOUNTER — TELEPHONE (OUTPATIENT)
Dept: FAMILY MEDICINE | Facility: CLINIC | Age: 53
End: 2023-05-31
Payer: COMMERCIAL

## 2023-05-31 NOTE — TELEPHONE ENCOUNTER
Patient Quality Outreach    Patient is due for the following:       Topic Date Due     Hepatitis B Vaccine (1 of 3 - 3-dose series) Never done     Zoster (Shingles) Vaccine (1 of 2) 09/09/2022       Next Steps:   Schedule a office visit for immunizations     Type of outreach:    Sent [employer] message.      Questions for provider review:    None           Vanessa Spivey MA

## 2023-09-28 ENCOUNTER — OFFICE VISIT (OUTPATIENT)
Dept: URGENT CARE | Facility: URGENT CARE | Age: 53
End: 2023-09-28
Payer: COMMERCIAL

## 2023-09-28 ENCOUNTER — NURSE TRIAGE (OUTPATIENT)
Dept: FAMILY MEDICINE | Facility: CLINIC | Age: 53
End: 2023-09-28
Payer: COMMERCIAL

## 2023-09-28 VITALS
SYSTOLIC BLOOD PRESSURE: 123 MMHG | BODY MASS INDEX: 26.14 KG/M2 | DIASTOLIC BLOOD PRESSURE: 80 MMHG | WEIGHT: 146.4 LBS | OXYGEN SATURATION: 97 % | TEMPERATURE: 97.6 F | HEART RATE: 124 BPM

## 2023-09-28 DIAGNOSIS — Z20.822 EXPOSURE TO COVID-19 VIRUS: ICD-10-CM

## 2023-09-28 DIAGNOSIS — J20.9 ACUTE BRONCHITIS WITH COEXISTING CONDITION, NEED PROPHYLACTIC THERAPY: Primary | ICD-10-CM

## 2023-09-28 LAB — SARS-COV-2 RNA RESP QL NAA+PROBE: NEGATIVE

## 2023-09-28 PROCEDURE — 99213 OFFICE O/P EST LOW 20 MIN: CPT | Performed by: PHYSICIAN ASSISTANT

## 2023-09-28 PROCEDURE — 87635 SARS-COV-2 COVID-19 AMP PRB: CPT | Performed by: PHYSICIAN ASSISTANT

## 2023-09-28 RX ORDER — ALBUTEROL SULFATE 90 UG/1
2 AEROSOL, METERED RESPIRATORY (INHALATION) EVERY 4 HOURS PRN
Qty: 18 G | Refills: 0 | Status: SHIPPED | OUTPATIENT
Start: 2023-09-28 | End: 2023-12-26

## 2023-09-28 RX ORDER — AZITHROMYCIN 250 MG/1
TABLET, FILM COATED ORAL
Qty: 6 TABLET | Refills: 0 | Status: SHIPPED | OUTPATIENT
Start: 2023-09-28 | End: 2023-12-26

## 2023-09-28 RX ORDER — BENZONATATE 200 MG/1
200 CAPSULE ORAL 3 TIMES DAILY PRN
Qty: 30 CAPSULE | Refills: 0 | Status: SHIPPED | OUTPATIENT
Start: 2023-09-28 | End: 2023-10-08

## 2023-09-28 ASSESSMENT — ENCOUNTER SYMPTOMS
FATIGUE: 0
SORE THROAT: 1
GASTROINTESTINAL NEGATIVE: 1
HEADACHES: 1
CONSTITUTIONAL NEGATIVE: 1
RHINORRHEA: 1
WHEEZING: 1
COUGH: 1
SINUS PRESSURE: 0
PALPITATIONS: 0
CHILLS: 0
CARDIOVASCULAR NEGATIVE: 1
SINUS PAIN: 0
SHORTNESS OF BREATH: 1
FEVER: 0

## 2023-09-28 NOTE — PROGRESS NOTES
Geraldine Gallego is a 53 year old, presenting for the following health issues:  Pharyngitis (Sore throat noticed runny nose possible sinus, Wednesday ear, throat and nose pain ), Otalgia (Pain in right ear since Wednesday), and Headache (Noticed it in her sleep the worse headache ever, she took some otc medication and headache did not go away)    HPI   Acute Illness  Acute illness concerns:   Onset/Duration: 2days  Symptoms:  Fever: No  Chills/Sweats: No  Headache (location?): YES  Sinus Pressure: No  Conjunctivitis:  No  Ear Pain: YES  Rhinorrhea: YES  Congestion: YES  Sore Throat: YES  Cough: YES-productive of yellow sputum, with shortness of breath  Wheeze: YES  Decreased Appetite: No  Nausea: No  Vomiting: No  Diarrhea: No  Dysuria/Freq.: No  Dysuria or Hematuria: No  Fatigue/Achiness: Yes  Sick/Strep Exposure: YES- covid at [place]  Therapies tried and outcome: rest,fluids,vicks, nyquil, allergy med with minimal relief    Patient Active Problem List   Diagnosis    Tension headache    Migraine headache    Adjustment disorder with depressed mood    Seasonal allergic rhinitis, unspecified allergic rhinitis trigger    TBI (traumatic brain injury), without LOC, sequela    Adnexal cyst    Low back pain    Lumbago with sciatica, left side    Hyperlipidemia LDL goal <70     Current Outpatient Medications   Medication    fluticasone (FLONASE) 50 MCG/ACT nasal spray    Multiple Vitamins-Minerals (HAIR SKIN NAILS) CAPS    polyethylene glycol (MIRALAX) 17 GM/Dose powder    Probiotic Product (PROBIOTIC DAILY PO)     No current facility-administered medications for this visit.        Allergies   Allergen Reactions    Atorvastatin Muscle Pain (Myalgia)    Medrol [Methylprednisolone] Itching    Promethazine Itching    Vicodin [Hydrocodone-Acetaminophen]      itchy     Review of Systems   Constitutional: Negative.  Negative for chills, fatigue and fever.   HENT:  Positive for congestion, ear pain, postnasal drip,  rhinorrhea and sore throat. Negative for ear discharge, hearing loss, sinus pressure and sinus pain.    Respiratory:  Positive for cough, shortness of breath and wheezing.    Cardiovascular: Negative.  Negative for chest pain, palpitations and peripheral edema.   Gastrointestinal: Negative.    Allergic/Immunologic: Negative for environmental allergies.   Neurological:  Positive for headaches.   All other systems reviewed and are negative.           Objective    /80   Pulse (!) 124   Temp 97.6  F (36.4  C) (Tympanic)   Wt 66.4 kg (146 lb 6.4 oz)   LMP 06/10/2013 (Exact Date)   SpO2 97%   BMI 26.14 kg/m    Body mass index is 26.14 kg/m .  Physical Exam  Vitals and nursing note reviewed.   Constitutional:       General: She is not in acute distress.     Appearance: Normal appearance. She is well-developed and normal weight. She is not ill-appearing.   HENT:      Head: Normocephalic and atraumatic.      Comments: TMs are intact without any erythema or bulging bilaterally.  Airway is patent.     Nose: Congestion and rhinorrhea present.      Mouth/Throat:      Lips: Pink.      Mouth: Mucous membranes are moist.      Pharynx: Oropharynx is clear. Uvula midline. No pharyngeal swelling, oropharyngeal exudate or posterior oropharyngeal erythema.      Tonsils: No tonsillar exudate or tonsillar abscesses.   Eyes:      General: No scleral icterus.     Extraocular Movements: Extraocular movements intact.      Conjunctiva/sclera: Conjunctivae normal.      Pupils: Pupils are equal, round, and reactive to light.   Neck:      Thyroid: No thyromegaly.   Cardiovascular:      Rate and Rhythm: Normal rate and regular rhythm.      Pulses: Normal pulses.      Heart sounds: Normal heart sounds, S1 normal and S2 normal. No murmur heard.     No friction rub. No gallop.   Pulmonary:      Effort: Pulmonary effort is normal. No accessory muscle usage, respiratory distress or retractions.      Breath sounds: Normal breath sounds and  air entry. No stridor. No decreased breath sounds, wheezing, rhonchi or rales.   Musculoskeletal:      Cervical back: Normal range of motion and neck supple.   Lymphadenopathy:      Cervical: No cervical adenopathy.   Skin:     General: Skin is warm and dry.      Nails: There is no clubbing.   Neurological:      Mental Status: She is alert and oriented to person, place, and time.   Psychiatric:         Mood and Affect: Mood normal.         Behavior: Behavior normal.         Thought Content: Thought content normal.         Judgment: Judgment normal.          Assessment/Plan:  Acute bronchitis with coexisting condition, need prophylactic therapy:  Will treat with zithromax X5days, tessalon perles, and albuterol inh as needed for symptoms.  Will check for covid as well. Recommend treatment with rest, fluids and chicken soup. Tylenol/ibuprofen prn fever/pain.  Recheck in clinic if symptoms worsen or if symptoms do not improve.  To the ER if she develops hemoptysis, chest pain, fevers>102, worsening shortness of breath/wheezing.    -     Symptomatic COVID-19 Virus (Coronavirus) by PCR Nose  -     azithromycin (ZITHROMAX) 250 MG tablet; 2 tablets the first day, then 1 tablet daily for the next 4 days  -     benzonatate (TESSALON) 200 MG capsule; Take 1 capsule (200 mg) by mouth 3 times daily as needed for cough  -     albuterol (PROAIR HFA/PROVENTIL HFA/VENTOLIN HFA) 108 (90 Base) MCG/ACT inhaler; Inhale 2 puffs into the lungs every 4 hours as needed for shortness of breath, wheezing or cough Use with spacer    Exposure to COVID-19 virus        Lucrecia See LIDIA Arciniega

## 2023-09-28 NOTE — TELEPHONE ENCOUNTER
Patient reports Tuesday she started having ear and throat pain on her right side.  She also had a runny nose. Wednesday she started feeling worse with ear pain, ST, runny nose and a slight cough. She felt miserable and stayed home from work.  She has tried tea, Nyquil and allergy and mucous medication. She did test for COVID and it was negative. Her worse symptom is her ear  pain. Her [employer] friend is ill also. She reports that walking makes her a little bit winded. This has been going on for a day.  She reports that she has a little right sided chest pressure. She had  nausea and diarrhea on Wednesday.  The ear pain is currently rated at 6/10. She is taking acetaminophen and that relieves the pain a little.      Denies: trouble breathing, fever, SOB, chest pain, neck/shoulder /arm pain, weakness.dizziness, nausea.    Nursing advice:  Due to her current symptoms she is to be assessed in person now.  There are no appointment in person at [place] or [place] this morning. She is advised to be seen is urgent care at 10:00 an today and she will do this. Patient was given signs and symptoms to go to the E.R. or call 911.  Patient verbalizes good understanding, agrees with plan and states ts needs no further support. Candace Goncalves R.N.         Reason for Disposition   Earache is main symptom   MILD difficulty breathing (e.g., minimal/no SOB at rest, SOB with walking, pulse < 100) of new-onset or worse than normal    Additional Information   Negative: SEVERE difficulty breathing (e.g., struggling for each breath, speaks in single words, pulse > 120)   Negative: Breathing stopped and hasn't returned   Negative: Choking on something   Negative: Bluish (or gray) lips or face   Negative: Difficult to awaken or acting confused (e.g., disoriented, slurred speech)   Negative: Passed out (i.e., fainted, collapsed and was not responding)   Negative: Wheezing started suddenly after medicine, an allergic food, or bee sting    "Negative: Stridor (harsh sound while breathing in)   Negative: Slow, shallow and weak breathing   Negative: MODERATE difficulty breathing (e.g., speaks in phrases, SOB even at rest, pulse 100-120) of new-onset or worse than normal   Negative: Oxygen level (e.g., pulse oximetry) 90% or lower   Negative: Wheezing can be heard across the room   Negative: Drooling or spitting out saliva (because can't swallow)   Negative: [1] Stiff neck (can't touch chin to chest) AND [2] fever   Negative: [1] Bony area of skull behind the ear is pink or swollen AND [2] fever   Negative: Fever > 104 F (40 C)   Negative: Patient sounds very sick or weak to the triager   Negative: [1] SEVERE pain AND [2] not improved 2 hours after taking analgesic medication (e.g., ibuprofen or acetaminophen)   Negative: Walking is very unsteady or feels very dizzy   Negative: Sudden onset of ear pain after long - thin object was inserted into the ear canal (e.g., pencil, Q-tip)   Negative: Diabetes mellitus or weak immune system (e.g., HIV positive, cancer chemo, splenectomy, organ transplant, chronic steroids)   Negative: New blurred vision or vision changes   Negative: White, yellow, or green discharge   Negative: Bloody discharge or unexplained bleeding from ear canal   Negative: Any history of prior \"blood clot\" in leg or lungs   Negative: Illness requiring prolonged bedrest in past month (e.g., immobilization, long hospital stay)   Negative: Hip or leg fracture (broken bone) in past month (or had cast on leg or ankle in past month)   Negative: Major surgery in the past month   Negative: Long-distance travel in past month (e.g., car, bus, train, plane; with trip lasting 6 or more hours)   Negative: Cancer treatment in past six months (or has cancer now)   Negative: Extra heartbeats, irregular heart beating, or heart is beating very fast (i.e., 'palpitations')   Negative: Fever > 103 F (39.4 C)   Negative: Fever > 101 F (38.3 C) and over 60 years of " age   Negative: Fever > 100.0 F (37.8 C) and bedridden (e.g., nursing home patient, stroke, chronic illness, recovering from surgery)   Negative: Fever > 100.0 F (37.8 C) and diabetes mellitus or weak immune system (e.g., HIV positive, cancer chemo, splenectomy, organ transplant, chronic steroids)   Negative: Periods where breathing stops and then resumes normally and bedridden (e.g., nursing home patient, CVA)   Negative: Pregnant or postpartum (from 0 to 6 weeks after delivery)   Negative: Patient sounds very sick or weak to the triager    Protocols used: Respiratory Multiple Symptoms - Guideline [id number], Earache-A-AH, Breathing Difficulty-A-OH

## 2023-10-02 ENCOUNTER — TELEPHONE (OUTPATIENT)
Dept: FAMILY MEDICINE | Facility: CLINIC | Age: 53
End: 2023-10-02
Payer: COMMERCIAL

## 2023-10-02 NOTE — LETTER
October 2, 2023      Lashonda Rivera  [address]  [facility] [address]        To Whom It May Concern:    Lashonda M Nicole  was seen on 9/28/23.  Please excuse her from work until 10/5/23 due to illness.        Sincerely,        Asmita Bhatia MD

## 2023-10-02 NOTE — TELEPHONE ENCOUNTER
Forms/Letter Request    Type of form/letter: Work    Have you been seen for this request: Yes 09/2/2023    Do we have the form/letter: Yes: Work Note    Who is the form from? Provider (if other please explain)    Where did/will the form come from? Provider's office    When is form/letter needed by: If possible today 10/02/2023    How would you like the form/letter returned: [facility]    Patient Notified form requests are processed in 3-5 business days:Yes    Could we send this information to you in [employer] or would you prefer to receive a phone call?:   Patient would prefer a phone call   Okay to leave a detailed message?: Yes at Cell number on file:    Telephone Information:   Mobile [phone]

## 2023-10-03 DIAGNOSIS — J20.9 ACUTE BRONCHITIS WITH COEXISTING CONDITION, NEED PROPHYLACTIC THERAPY: ICD-10-CM

## 2023-10-03 RX ORDER — AZITHROMYCIN 250 MG/1
TABLET, FILM COATED ORAL
Qty: 6 TABLET | Refills: 0 | OUTPATIENT
Start: 2023-10-03

## 2023-10-03 NOTE — TELEPHONE ENCOUNTER
New Medication Request    Contacts         Type Contact Phone/Fax    10/03/2023 09:03 AM CDT Phone (Incoming) Lashonda Rivera (Self) [phone] (M)            What medication are you requesting?: Azithromycin     Reason for medication request: Needs to clear up yellow mucus    Have you taken this medication before?: Yes: Not working     Controlled Substance Agreement on file:   CSA -- Patient Level:    CSA: None found at the patient level.         Patient offered an appointment? No    Preferred Pharmacy:   [id number] - [facility] [address] [place] [address]  [address] [facility] [address]  Phone: [phone] Fax: [phone]      Could we send this information to you in [employer] or would you prefer to receive a phone call?:   No preference   Okay to leave a detailed message?: Yes at Cell number on file:    Telephone Information:   Mobile [phone]

## 2023-10-18 ENCOUNTER — E-VISIT (OUTPATIENT)
Dept: FAMILY MEDICINE | Facility: CLINIC | Age: 53
End: 2023-10-18
Payer: COMMERCIAL

## 2023-10-18 DIAGNOSIS — N89.8 VAGINAL DISCHARGE: Primary | ICD-10-CM

## 2023-10-18 PROCEDURE — 99207 PR NON-BILLABLE SERV PER CHARTING: CPT | Performed by: PREVENTIVE MEDICINE

## 2023-10-19 ENCOUNTER — OFFICE VISIT (OUTPATIENT)
Dept: URGENT CARE | Facility: URGENT CARE | Age: 53
End: 2023-10-19
Payer: COMMERCIAL

## 2023-10-19 VITALS
RESPIRATION RATE: 14 BRPM | WEIGHT: 143.9 LBS | SYSTOLIC BLOOD PRESSURE: 132 MMHG | BODY MASS INDEX: 25.69 KG/M2 | DIASTOLIC BLOOD PRESSURE: 82 MMHG | HEART RATE: 101 BPM | OXYGEN SATURATION: 100 % | TEMPERATURE: 98.3 F

## 2023-10-19 DIAGNOSIS — T36.95XA ANTIBIOTIC-INDUCED YEAST INFECTION: Primary | ICD-10-CM

## 2023-10-19 DIAGNOSIS — N89.8 VAGINAL DISCHARGE: ICD-10-CM

## 2023-10-19 DIAGNOSIS — B37.9 ANTIBIOTIC-INDUCED YEAST INFECTION: Primary | ICD-10-CM

## 2023-10-19 LAB
CLUE CELLS: ABNORMAL
TRICHOMONAS, WET PREP: ABNORMAL
WBC'S/HIGH POWER FIELD, WET PREP: ABNORMAL
YEAST, WET PREP: ABNORMAL

## 2023-10-19 PROCEDURE — 99213 OFFICE O/P EST LOW 20 MIN: CPT

## 2023-10-19 PROCEDURE — 87210 SMEAR WET MOUNT SALINE/INK: CPT

## 2023-10-19 RX ORDER — FLUCONAZOLE 150 MG/1
150 TABLET ORAL ONCE
Qty: 1 TABLET | Refills: 0 | Status: SHIPPED | OUTPATIENT
Start: 2023-10-19 | End: 2023-10-19

## 2023-10-19 NOTE — PROGRESS NOTES
ASSESSMENT:  (B37.9,  T36.95XA) Antibiotic-induced yeast infection  (primary encounter diagnosis)  Plan: fluconazole (DIFLUCAN) 150 MG tablet  (N89.8) Vaginal discharge  Plan: Wet prep - lab collect  PLAN:  Informed the patient that the wet prep test is negative for any infection.  We discussed that her symptoms and the most recent antibiotic use are consistent with an antibiotic induced yeast infection despite the negative test.  Informed her to take the medication as prescribed.  We discussed the need to return to clinic, follow-up with her primary care provider or OB should symptoms persist.  Patient acknowledged her understanding of the above plan.  The use of [employer] services may have been used to construct the content in this note; any grammatical or spelling errors are non-intentional. Please contact the author of this note directly if you are in need of any clarification.    SUBJECTIVE:  Lashonda Rivera is a 53 year old female who presents with vaginal discharge that is milky white that has been occurring over the past week.  Patient reports finishing azithromycin recently.      Patient denies any vaginal itching or pain.  Hysterectomy.   No STD concerns.    ROS:  Negative except noted above.   OBJECTIVE:  /82 (BP Location: Right arm, Patient Position: Sitting, Cuff Size: Adult Large)   Pulse 101   Temp 98.3  F (36.8  C) (Oral)   Resp 14   Wt 65.3 kg (143 lb 14.4 oz)   LMP 06/10/2013 (Exact Date)   SpO2 100%   BMI 25.69 kg/m    GENERAL APPEARANCE: healthy, alert and no distress  : deferred  SKIN: no suspicious lesions or rashes  Wet prep: no trichomonas, monilia, or clue cells

## 2023-10-19 NOTE — PATIENT INSTRUCTIONS
Wet prep test is negative for any infection.  Your symptoms and the most recent antibiotic use are consistent with an antibiotic induced yeast infection despite the negative test.  Take medication as prescribed.  Return to clinic, follow-up with your primary care provider or OB should symptoms persist.

## 2023-10-20 NOTE — RESULT ENCOUNTER NOTE
MsJovani Rivera,    No yeast, bacterial vaginosis or trichomoniasis seen.    Please contact the clinic if you have additional questions.  Thank you.    Sincerely,    Karen Mitchell MD

## 2023-11-10 ENCOUNTER — TELEPHONE (OUTPATIENT)
Dept: FAMILY MEDICINE | Facility: CLINIC | Age: 53
End: 2023-11-10
Payer: COMMERCIAL

## 2023-11-10 NOTE — TELEPHONE ENCOUNTER
Patient Quality Outreach    Patient is due for the following:       Topic Date Due    Hepatitis B Vaccine (1 of 3 - 3-dose series) Never done    Zoster (Shingles) Vaccine (1 of 2) Never done    Flu Vaccine (1) 09/01/2023    COVID-19 Vaccine (5 - 2023-24 season) 09/01/2023       Next Steps:   Schedule a office visit for Immunizations     Type of outreach:    Sent [employer] message.    Next Steps:  Reach out within 90 days via [employer].    Max number of attempts reached: Yes. Will try again in 90 days if patient still on fail list.    Questions for provider review:    None           Vanessa Spivey MA

## 2023-12-26 ENCOUNTER — NURSE TRIAGE (OUTPATIENT)
Dept: FAMILY MEDICINE | Facility: CLINIC | Age: 53
End: 2023-12-26

## 2023-12-26 ENCOUNTER — OFFICE VISIT (OUTPATIENT)
Dept: FAMILY MEDICINE | Facility: CLINIC | Age: 53
End: 2023-12-26
Payer: COMMERCIAL

## 2023-12-26 VITALS
TEMPERATURE: 97.1 F | SYSTOLIC BLOOD PRESSURE: 124 MMHG | HEIGHT: 63 IN | DIASTOLIC BLOOD PRESSURE: 78 MMHG | BODY MASS INDEX: 25.02 KG/M2 | WEIGHT: 141.2 LBS | OXYGEN SATURATION: 100 % | HEART RATE: 104 BPM

## 2023-12-26 DIAGNOSIS — J02.9 SORE THROAT: Primary | ICD-10-CM

## 2023-12-26 PROCEDURE — 99213 OFFICE O/P EST LOW 20 MIN: CPT | Performed by: PHYSICIAN ASSISTANT

## 2023-12-26 RX ORDER — LIDOCAINE HYDROCHLORIDE 20 MG/ML
5 SOLUTION OROPHARYNGEAL
Qty: 100 ML | Refills: 0 | Status: SHIPPED | OUTPATIENT
Start: 2023-12-26

## 2023-12-26 ASSESSMENT — PAIN SCALES - GENERAL: PAINLEVEL: EXTREME PAIN (9)

## 2023-12-26 ASSESSMENT — ENCOUNTER SYMPTOMS: SORE THROAT: 1

## 2023-12-26 NOTE — TELEPHONE ENCOUNTER
"Patient called in with a sore throat.  She states that when she drinks water it is extremely painful.  She also notes R ear pain that is stabbing, runny nose, cough, and she feels hot.  She does not have a thermometer however to test her temperature.  Symptoms started yesterday.  Patient stated she is able to drink fluids is just painful.      Disposition was to go to the Office today.  Able to get the patient an appointment with same day provider.          Reason for Disposition   SEVERE sore throat pain    Additional Information   Negative: SEVERE difficulty breathing (e.g., struggling for each breath, speaks in single words)   Negative: Sounds like a life-threatening emergency to the triager   Negative: Throat culture results, call about   Negative: Productive cough is main symptom   Negative: Runny nose is main symptom   Negative: Drooling or spitting out saliva (because can't swallow)   Negative: Unable to open mouth completely   Negative: Drinking very little and has signs of dehydration (e.g., no urine > 12 hours, very dry mouth, very lightheaded)   Negative: Patient sounds very sick or weak to the triager   Negative: Difficulty breathing (per caller) but not severe   Negative: Fever > 103 F (39.4 C)   Negative: Refuses to drink anything for > 12 hours    Answer Assessment - Initial Assessment Questions  1. ONSET: \"When did the throat start hurting?\" (Hours or days ago)       Yesterday  2. SEVERITY: \"How bad is the sore throat?\" (Scale 1-10; mild, moderate or severe)    - MILD (1-3):  Doesn't interfere with eating or normal activities.    - MODERATE (4-7): Interferes with eating some solids and normal activities.    - SEVERE (8-10):  Excruciating pain, interferes with most normal activities.    - SEVERE WITH DYSPHAGIA (10): Can't swallow liquids, drooling.      Severe  3. STREP EXPOSURE: \"Has there been any exposure to strep within the past week?\" If Yes, ask: \"What type of contact occurred?\"       None  4.  " "VIRAL SYMPTOMS: \"Are there any symptoms of a cold, such as a runny nose, cough, hoarse voice or red eyes?\"       Runny nose, hoarse voice, ear pain,   5. FEVER: \"Do you have a fever?\" If Yes, ask: \"What is your temperature, how was it measured, and when did it start?\"      Feel hot no fever  6. PUS ON THE TONSILS: \"Is there pus on the tonsils in the back of your throat?\"      Nothing  7. OTHER SYMPTOMS: \"Do you have any other symptoms?\" (e.g., difficulty breathing, headache, rash)      None  8. PREGNANCY: \"Is there any chance you are pregnant?\" \"When was your last menstrual period?\"      None    Protocols used: Sore Throat-A-OH    "

## 2023-12-26 NOTE — PATIENT INSTRUCTIONS
You can use the lidocaine solution as needed every 3 hours for pain.  Also start taking Tylenol and ibuprofen.  You can take up to 600 mg of ibuprofen every 6 hours and 1000 mg of Tylenol 6 hours.

## 2023-12-26 NOTE — PROGRESS NOTES
"  Assessment & Plan     Sore throat  Discussed sore throat with patient, high suspicion for viral etiology.  Offered COVID testing, patient will do home testing.  Did discuss strep testing, think unnecessary considering exam findings and lack of known exposure.  Patient in agreement.  Will try viscous lidocaine gargle to try and help with pain, reviewed with patient.  Will have her start over-the-counter Tylenol and ibuprofen as well.  If not having improvement over the next week follow-up for recheck.  - lidocaine, viscous, (XYLOCAINE) 2 % solution; Swish and spit 5 mLs in mouth every 3 hours as needed for pain ; Max 8 doses/24 hour period.             BMI:   Estimated body mass index is 25.21 kg/m  as calculated from the following:    Height as of this encounter: 1.594 m (5' 2.75\").    Weight as of this encounter: 64 kg (141 lb 3.2 oz).           Kristal Boyd PA-C  [facility] Gal[facility] is a 53 year old, presenting for the following health issues:  Pharyngitis      12/26/2023    12:59 PM   Additional Questions   Roomed by Nisha Zhou       Pharyngitis     History of Present Illness       Reason for visit:  Soar throat and stuffy, runny nose and ear ache  Symptom onset:  1-3 days ago  Symptom intensity:  Moderate  Symptom progression:  Staying the same  Had these symptoms before:  No    She eats 0-1 servings of fruits and vegetables daily.She consumes 1 sweetened beverage(s) daily.She exercises with enough effort to increase her heart rate 10 to 19 minutes per day.  She exercises with enough effort to increase her heart rate 3 or less days per week.   She is taking medications regularly.         Sore throat started yesterday, has been severe.  Able to swallow liquids but very painful.  Has not taking medication due to fear of swallowing pills with her current pain.  Has not tried food for same reason.  Has some associated rhinorrhea, congestion, and right-sided " ear pain.  No cough.  Felt warm last night but no measured fever.    Was waiting in ER lobby with family member for an extended time, did not have a mask on.  No specific sick contacts.  States        Review of Systems   HENT:  Positive for sore throat.             Objective    LMP 06/10/2013 (Exact Date)   There is no height or weight on file to calculate BMI.  Physical Exam   GENERAL: healthy, alert and no distress  EYES: Eyes grossly normal to inspection, PERRL and conjunctivae and sclerae normal  HENT: Normocephalic, atraumatic.  TMs clear bilaterally.  Nasopharynx with minimal congestion.  Posterior oropharynx with mild erythema, no tonsillar enlargement or exudate.  NECK: no adenopathy, no asymmetry, masses, or scars and thyroid normal to palpation  RESP: lungs clear to auscultation - no rales, rhonchi or wheezes  CV: regular rate and rhythm, normal S1 S2, no S3 or S4, no murmur

## 2023-12-29 ENCOUNTER — TELEPHONE (OUTPATIENT)
Dept: FAMILY MEDICINE | Facility: CLINIC | Age: 53
End: 2023-12-29
Payer: COMMERCIAL

## 2023-12-29 DIAGNOSIS — R05.1 ACUTE COUGH: Primary | ICD-10-CM

## 2023-12-29 NOTE — TELEPHONE ENCOUNTER
Reason for Call:  Other prescription    Detailed comments: Patient was seen on 12-26-23 and is requesting something for her cough. She would like cough pearls and not the liquid form. Please call    Phone Number Patient can be reached at: Cell number on file:    Telephone Information:   Mobile [phone]       Best Time: any    Can we leave a detailed message on this number? YES    Call taken on 12/29/2023 at 9:15 AM by Ofe Sullivan

## 2024-01-02 RX ORDER — BENZONATATE 100 MG/1
100-200 CAPSULE ORAL 3 TIMES DAILY PRN
Qty: 30 CAPSULE | Refills: 0 | Status: SHIPPED | OUTPATIENT
Start: 2024-01-02 | End: 2024-01-23

## 2024-01-02 NOTE — TELEPHONE ENCOUNTER
Called and let patient know that this was sent to he pharmacy.  Daniela Vera MA  [facility]   Primary Care

## 2024-01-19 ENCOUNTER — OFFICE VISIT (OUTPATIENT)
Dept: FAMILY MEDICINE | Facility: CLINIC | Age: 54
End: 2024-01-19
Payer: COMMERCIAL

## 2024-01-19 VITALS
RESPIRATION RATE: 20 BRPM | TEMPERATURE: 97.7 F | HEIGHT: 63 IN | WEIGHT: 141 LBS | HEART RATE: 93 BPM | DIASTOLIC BLOOD PRESSURE: 75 MMHG | SYSTOLIC BLOOD PRESSURE: 125 MMHG | BODY MASS INDEX: 24.98 KG/M2 | OXYGEN SATURATION: 99 %

## 2024-01-19 DIAGNOSIS — Z13.220 LIPID SCREENING: ICD-10-CM

## 2024-01-19 DIAGNOSIS — Z28.20 VACCINE REFUSED BY PATIENT: ICD-10-CM

## 2024-01-19 DIAGNOSIS — Z12.31 VISIT FOR SCREENING MAMMOGRAM: ICD-10-CM

## 2024-01-19 DIAGNOSIS — Z00.00 ROUTINE GENERAL MEDICAL EXAMINATION AT A HEALTH CARE FACILITY: Primary | ICD-10-CM

## 2024-01-19 DIAGNOSIS — Z13.1 ENCOUNTER FOR SCREENING EXAMINATION FOR IMPAIRED GLUCOSE REGULATION AND DIABETES MELLITUS: ICD-10-CM

## 2024-01-19 DIAGNOSIS — M21.611 BUNION, RIGHT: ICD-10-CM

## 2024-01-19 DIAGNOSIS — N64.4 BREAST PAIN, RIGHT: ICD-10-CM

## 2024-01-19 LAB
CLUE CELLS: PRESENT
TRICHOMONAS, WET PREP: ABNORMAL
WBC'S/HIGH POWER FIELD, WET PREP: ABNORMAL
YEAST, WET PREP: ABNORMAL

## 2024-01-19 PROCEDURE — 99213 OFFICE O/P EST LOW 20 MIN: CPT | Mod: 25 | Performed by: PREVENTIVE MEDICINE

## 2024-01-19 PROCEDURE — 99396 PREV VISIT EST AGE 40-64: CPT | Performed by: PREVENTIVE MEDICINE

## 2024-01-19 PROCEDURE — 80061 LIPID PANEL: CPT | Performed by: PREVENTIVE MEDICINE

## 2024-01-19 PROCEDURE — 87210 SMEAR WET MOUNT SALINE/INK: CPT

## 2024-01-19 PROCEDURE — 36415 COLL VENOUS BLD VENIPUNCTURE: CPT | Performed by: PREVENTIVE MEDICINE

## 2024-01-19 PROCEDURE — 82947 ASSAY GLUCOSE BLOOD QUANT: CPT | Performed by: PREVENTIVE MEDICINE

## 2024-01-19 ASSESSMENT — ENCOUNTER SYMPTOMS
HEARTBURN: 0
PALPITATIONS: 0
JOINT SWELLING: 0
SHORTNESS OF BREATH: 0
COUGH: 0
DIARRHEA: 0
NAUSEA: 0
NERVOUS/ANXIOUS: 0
CONSTIPATION: 0
EYE PAIN: 0
HEADACHES: 0
FREQUENCY: 0
CHILLS: 0
ARTHRALGIAS: 0
ABDOMINAL PAIN: 0
HEMATOCHEZIA: 0
DYSURIA: 0
HEMATURIA: 0
FEVER: 0
MYALGIAS: 0
WEAKNESS: 0
DIZZINESS: 0
BREAST MASS: 0
PARESTHESIAS: 0
SORE THROAT: 0

## 2024-01-19 NOTE — PATIENT INSTRUCTIONS
Toe separators     To schedule imaging please call  at [facility]         Preventive Health Recommendations  Female Ages 50 - 64    Yearly exam: See your health care provider every year in order to  Review health changes.   Discuss preventive care.    Review your medicines if your doctor has prescribed any.    Get a Pap test every three years (unless you have an abnormal result and your provider advises testing more often).  If you get Pap tests with HPV test, you only need to test every 5 years, unless you have an abnormal result.   You do not need a Pap test if your uterus was removed (hysterectomy) and you have not had cancer.  You should be tested each year for STDs (sexually transmitted diseases) if you're at risk.   Have a mammogram every 1 to 2 years.  Have a colonoscopy at age 45, or have a yearly FIT test (stool test). These exams screen for colon cancer.    Have a cholesterol test every 5 years, or more often if advised.  Have a diabetes test (fasting glucose) every three years. If you are at risk for diabetes, you should have this test more often.   If you are at risk for osteoporosis (brittle bone disease), think about having a bone density scan (DEXA).    Shots: Get a flu shot each year. Get a tetanus shot every 10 years.    Nutrition:   Eat at least 5 servings of fruits and vegetables each day.  Eat whole-grain bread, whole-wheat pasta and brown rice instead of white grains and rice.  Get adequate Calcium and Vitamin D.     Lifestyle  Exercise at least 150 minutes a week (30 minutes a day, 5 days a week). This will help you control your weight and prevent disease.  Limit alcohol to one drink per day.  No smoking.   Wear sunscreen to prevent skin cancer.   See your dentist every six months for an exam and cleaning.  See your eye doctor every 1 to 2 years.    Preventive Health Recommendations  Female Ages 50 - 64    Yearly exam: See your health care provider every year in order  to  Review health changes.   Discuss preventive care.    Review your medicines if your doctor has prescribed any.    Get a Pap test every three years (unless you have an abnormal result and your provider advises testing more often).  If you get Pap tests with HPV test, you only need to test every 5 years, unless you have an abnormal result.   You do not need a Pap test if your uterus was removed (hysterectomy) and you have not had cancer.  You should be tested each year for STDs (sexually transmitted diseases) if you're at risk.   Have a mammogram every 1 to 2 years.  Have a colonoscopy at age 45, or have a yearly FIT test (stool test). These exams screen for colon cancer.    Have a cholesterol test every 5 years, or more often if advised.  Have a diabetes test (fasting glucose) every three years. If you are at risk for diabetes, you should have this test more often.   If you are at risk for osteoporosis (brittle bone disease), think about having a bone density scan (DEXA).    Shots: Get a flu shot each year. Get a tetanus shot every 10 years.    Nutrition:   Eat at least 5 servings of fruits and vegetables each day.  Eat whole-grain bread, whole-wheat pasta and brown rice instead of white grains and rice.  Get adequate Calcium and Vitamin D.     Lifestyle  Exercise at least 150 minutes a week (30 minutes a day, 5 days a week). This will help you control your weight and prevent disease.  Limit alcohol to one drink per day.  No smoking.   Wear sunscreen to prevent skin cancer.   See your dentist every six months for an exam and cleaning.  See your eye doctor every 1 to 2 years.

## 2024-01-19 NOTE — PROGRESS NOTES
Preventive Care Visit  [facility] CAMI Bhatia MD, Family Medicine  Jan 19, 2024       SUBJECTIVE:   Lashonda is a 53 year old, presenting for the following:  Physical        1/19/2024     9:43 AM   Additional Questions   Roomed by Charles     Healthy Habits:     Getting at least 3 servings of Calcium per day:  Yes    Bi-annual eye exam:  Yes    Dental care twice a year:  Yes    Sleep apnea or symptoms of sleep apnea:  None    Diet:  Regular (no restrictions)    Frequency of exercise:  2-3 days/week    Duration of exercise:  45-60 minutes    Taking medications regularly:  Yes    Medication side effects:  None    Additional concerns today:  Yes    Mammogram 2/7/23  Colonoscopy 4/19, repeat in 10 years   Has changed jobs, happy with work.  No changes in family history  Has increased exercise, reduced sugar, intermittent fasting and lost weight.       Would like to wait on vaccines once back from vacation.     Today's PHQ-2 Score:       1/19/2024     9:40 AM   PHQ-2 ( 1999 [employer])   Q1: Little interest or pleasure in doing things 1   Q2: Feeling down, depressed or hopeless 0   PHQ-2 Score 1   Q1: Little interest or pleasure in doing things Several days   Q2: Feeling down, depressed or hopeless Not at all   PHQ-2 Score 1           Via the Health Maintenance questionnaire, the patient has reported the following services have been completed -Mammogram, this information has been sent to the abstraction team.      RIGHT Breast tenderness:  -had breast biopsy in 2016.  -painful since 2 months  -no nipple changes.  -concerned about bumps around areola  -No family history  -no hormones  -no chest pain     -Right foot pain:  -bunion+  -painful  -wrapping it  -extra bandages  -no bleeding        Social History     Tobacco Use    Smoking status: Never    Smokeless tobacco: Never   Substance Use Topics    Alcohol use: No     Alcohol/week: 0.0 standard drinks of alcohol             1/19/2024     9:40  AM   Alcohol Use   Prescreen: >3 drinks/day or >7 drinks/week? Not Applicable          No data to display              Reviewed orders with patient.  Reviewed health maintenance and updated orders accordingly - Yes  Lab work is in process  Labs reviewed in EPIC  BP Readings from Last 3 Encounters:   01/19/24 125/75   12/26/23 124/78   10/19/23 132/82    Wt Readings from Last 3 Encounters:   01/19/24 64 kg (141 lb)   12/26/23 64 kg (141 lb 3.2 oz)   10/19/23 65.3 kg (143 lb 14.4 oz)                  Patient Active Problem List   Diagnosis    Tension headache    Migraine headache    Adjustment disorder with depressed mood    Seasonal allergic rhinitis, unspecified allergic rhinitis trigger    TBI (traumatic brain injury), without LOC, sequela    Adnexal cyst    Low back pain    Lumbago with sciatica, left side    Hyperlipidemia LDL goal <70     Past Surgical History:   Procedure Laterality Date    BIOPSY BREAST Right 6/22/2016    Procedure: BIOPSY BREAST;  Surgeon: Kaiser Roy MD;  Location: [facility]    COLONOSCOPY WITH CO2 INSUFFLATION N/A 4/4/2019    Procedure: COLONOSCOPY WITH CO2 INSUFFLATION;  Surgeon: Moise Ingram MD;  Location: [facility] OR    HYSTERECTOMY, PAP NO LONGER INDICATED      SKIN GRAFT, EACH ADDN 100SQCM  1977    buttocks to left  foot    SURGICAL HISTORY OF -   2004    L wrist tendon    ZZC TOTAL ABDOM HYSTERECTOMY  06/11/2013    benign fibroids    ZZC TREAT ECTOPIC PREG,RMV TUBE/OVARY  1995       Social History     Tobacco Use    Smoking status: Never    Smokeless tobacco: Never   Substance Use Topics    Alcohol use: No     Alcohol/week: 0.0 standard drinks of alcohol     Family History   Problem Relation Age of Onset    Diabetes Mother     Diabetes Maternal Grandmother     Hypertension Maternal Grandmother     Arthritis Maternal Grandmother     Cancer Maternal Grandmother         bladder cancer/cervix    Cancer Maternal Grandfather         bone    Asthma Son     Unknown/Adopted Paternal  Grandfather     Unknown/Adopted Paternal Grandmother     Cervical Cancer Maternal Aunt          Current Outpatient Medications   Medication Sig Dispense Refill    Multiple Vitamins-Minerals (HAIR SKIN NAILS) CAPS       benzonatate (TESSALON) 100 MG capsule Take 1-2 capsules (100-200 mg) by mouth 3 times daily as needed for cough (Patient not taking: Reported on 1/19/2024) 30 capsule 0    lidocaine, viscous, (XYLOCAINE) 2 % solution Swish and spit 5 mLs in mouth every 3 hours as needed for pain ; Max 8 doses/24 hour period. (Patient not taking: Reported on 1/19/2024) 100 mL 0     Allergies   Allergen Reactions    Atorvastatin Muscle Pain (Myalgia)    Medrol [Methylprednisolone] Itching    Promethazine Itching    Vicodin [Hydrocodone-Acetaminophen]      itchy       Breast Cancer Screening:    FHS-7:       2/1/2022     1:52 PM 2/7/2023     3:07 PM   Breast CA Risk Assessment (FHS-7)   Did any of your first-degree relatives have breast or ovarian cancer? No No   Did any of your relatives have bilateral breast cancer? No No   Did any man in your family have breast cancer? No No   Did any woman in your family have breast and ovarian cancer? No No   Did any woman in your family have breast cancer before age 50 y? No No   Do you have 2 or more relatives with breast and/or ovarian cancer? No No   Do you have 2 or more relatives with breast and/or bowel cancer? No No     click delete button to remove this line now  Mammogram Screening: Recommended annual mammography  Pertinent mammograms are reviewed under the imaging tab.    History of abnormal Pap smear: Status post benign hysterectomy. Health Maintenance and Surgical History updated.      9/4/2012     3:55 PM 10/16/2009    12:00 AM   PAP / HPV   PAP (Historical) NIL  NIL      Reviewed and updated as needed this visit by clinical staff   Tobacco  Allergies  Meds  Problems  Med Hx  Surg Hx  Fam Hx          Reviewed and updated as needed this visit by Provider    Tobacco  Allergies  Meds  Problems  Med Hx  Surg Hx  Fam Hx          Past Medical History:   Diagnosis Date    Adjustment disorder with depressed mood 1/19/2016    Breast fibroadenoma, right 2016    excised    Complication of anesthesia     nausea/ use scop. patches    H/O: hysterectomy 2013    Hypothyroidism 2005    Now resolved    Leiomyoma of uterus, unspecified 2013    resolved    Menorrhagia 2000    resolved    Migraine headache 10/2/2012    Recurrent UTI 2012    TBI (traumatic brain injury), without LOC, sequela 9/12/2017    Vitamin B 12 deficiency 2005    Resolved      Past Surgical History:   Procedure Laterality Date    BIOPSY BREAST Right 6/22/2016    Procedure: BIOPSY BREAST;  Surgeon: Kaiser Roy MD;  Location: [facility]    COLONOSCOPY WITH CO2 INSUFFLATION N/A 4/4/2019    Procedure: COLONOSCOPY WITH CO2 INSUFFLATION;  Surgeon: Moise Ingram MD;  Location: [facility] OR    HYSTERECTOMY, PAP NO LONGER INDICATED      SKIN GRAFT, EACH ADDN 100SQCM  1977    buttocks to left  foot    SURGICAL HISTORY OF -   2004    L wrist tendon    ZZC TOTAL ABDOM HYSTERECTOMY  06/11/2013    benign fibroids    ZZC TREAT ECTOPIC PREG,RMV TUBE/OVARY  1995     Review of Systems   Constitutional:  Negative for chills and fever.   HENT:  Negative for congestion, ear pain, hearing loss and sore throat.    Eyes:  Positive for visual disturbance. Negative for pain.   Respiratory:  Negative for cough and shortness of breath.    Cardiovascular:  Negative for chest pain and palpitations.   Gastrointestinal:  Negative for abdominal pain, constipation, diarrhea and nausea.   Genitourinary:  Negative for dysuria, frequency, genital sores, hematuria, pelvic pain, urgency, vaginal bleeding and vaginal discharge.   Musculoskeletal:  Negative for arthralgias, joint swelling and myalgias.   Skin:  Negative for rash.   Neurological:  Negative for dizziness, weakness and headaches.   Psychiatric/Behavioral:  The patient is not  "nervous/anxious.        OBJECTIVE:   /75 (BP Location: Left arm, Patient Position: Sitting, Cuff Size: Adult Regular)   Pulse 93   Temp 97.7  F (36.5  C) (Oral)   Resp 20   Ht 1.6 m (5' 3\")   Wt 64 kg (141 lb)   LMP 06/10/2013 (Exact Date)   SpO2 99%   BMI 24.98 kg/m     Estimated body mass index is 24.98 kg/m  as calculated from the following:    Height as of this encounter: 1.6 m (5' 3\").    Weight as of this encounter: 64 kg (141 lb).  Physical Exam  GENERAL: alert and no distress  EYES: Eyes grossly normal to inspection, PERRL and conjunctivae and sclerae normal  HENT: ear canals and TM's normal  NECK: no adenopathy, no asymmetry  RESP: lungs clear to auscultation - no rales, rhonchi or wheezes  BREAST: normal without masses, tenderness++ right breast just lateral to the areola at 3 o clock position. No nipple discharge and no palpable axillary masses or adenopathy  CV: regular rate and rhythm, normal S1 S2, no S3 or S4, no murmur, click or rub, no peripheral edema  ABDOMEN: soft, nontender, no hepatosplenomegaly, no masses and bowel sounds normal  MS: no gross musculoskeletal defects noted, no edema  SKIN: no suspicious lesions or rashes  NEURO: Normal strength and tone, mentation intact and speech normal  PSYCH: mentation appears normal, affect normal/bright  LYMPH: no cervical, supraclavicular, axillary, or inguinal adenopathy    Diagnostic Test Results:  Labs reviewed in Epic  No results found for this or any previous visit (from the past 24 hour(s)).    ASSESSMENT/PLAN:   Routine general medical examination at a health care facility  -preventive guidelines reviewed   - PRIMARY CARE FOLLOW-UP SCHEDULING  - Glucose  - Glucose    Breast pain, right  -await imaging results  -history of biopsy   - MA Diagnostic Digital Bilateral  - US Breast Right Limited 1-3 Quadrants    Bunion, right  -we discussed wide shoes and toe separators  -if not better then 2-3 months then refer to Podiatry.     Lipid " screening  - Lipid panel reflex to direct LDL Non-fasting    Encounter for screening examination for impaired glucose regulation and diabetes mellitus  - Glucose    Vaccine refused by patient  -will get Flu, Covid and Shingles vaccines once back from vacation           Counseling  Reviewed preventive health counseling, as reflected in patient instructions       Regular exercise       Healthy diet/nutrition       Vision screening        She reports that she has never smoked. She has never used smokeless tobacco.              Signed Electronically by: Asmita Bhatia MD MPH    Answers submitted by the patient for this visit:  Annual Preventive Visit (Submitted on 1/19/2024)  Chief Complaint: Annual Exam:  Blood in stool: No  heartburn: No  peripheral edema: No  mood changes: No  Skin sensation changes: No  tenderness: Yes  breast mass: No  breast discharge: No

## 2024-01-20 LAB
CHOLEST SERPL-MCNC: 159 MG/DL
FASTING STATUS PATIENT QL REPORTED: YES
FASTING STATUS PATIENT QL REPORTED: YES
GLUCOSE SERPL-MCNC: 76 MG/DL (ref 70–99)
HDLC SERPL-MCNC: 51 MG/DL
LDLC SERPL CALC-MCNC: 96 MG/DL
NONHDLC SERPL-MCNC: 108 MG/DL
TRIGL SERPL-MCNC: 60 MG/DL

## 2024-01-22 NOTE — RESULT ENCOUNTER NOTE
Lashonda, your test results were within normal limits.  Cholesterol looks good.  Glucose is normal, you do not have diabetes.    Please do not hesitate to call us at [phone] if you have any questions or concerns.    Thank you,    Asmita Bhatia MD MPH

## 2024-01-23 ENCOUNTER — TELEPHONE (OUTPATIENT)
Dept: FAMILY MEDICINE | Facility: CLINIC | Age: 54
End: 2024-01-23
Payer: COMMERCIAL

## 2024-01-23 DIAGNOSIS — N64.4 BREAST PAIN, RIGHT: Primary | ICD-10-CM

## 2024-01-23 RX ORDER — IBUPROFEN 600 MG/1
600 TABLET, FILM COATED ORAL EVERY 8 HOURS PRN
Qty: 30 TABLET | Refills: 0 | Status: SHIPPED | OUTPATIENT
Start: 2024-01-23 | End: 2024-10-04

## 2024-01-23 NOTE — TELEPHONE ENCOUNTER
FYI - Status Update    Who is Calling: patient    Update: pt came in for physical on Friday, having breast pain, next Monday was the next available for xray and ultrasound for the pain. Can Dr. Bhatia prescribe ibuprofen to help pt get through the sharp shooting/aching pain when she walks, it hurts.    Does caller want a call/response back: Yes     Could we send this information to you in Pulsity or would you prefer to receive a phone call?:   Patient would like to be contacted via Pulsity

## 2024-01-29 ENCOUNTER — ANCILLARY PROCEDURE (OUTPATIENT)
Dept: MAMMOGRAPHY | Facility: CLINIC | Age: 54
End: 2024-01-29
Attending: PREVENTIVE MEDICINE
Payer: COMMERCIAL

## 2024-01-29 ENCOUNTER — ANCILLARY PROCEDURE (OUTPATIENT)
Dept: ULTRASOUND IMAGING | Facility: CLINIC | Age: 54
End: 2024-01-29
Attending: PREVENTIVE MEDICINE
Payer: COMMERCIAL

## 2024-01-29 DIAGNOSIS — N64.4 BREAST PAIN, RIGHT: ICD-10-CM

## 2024-01-29 PROCEDURE — G0279 TOMOSYNTHESIS, MAMMO: HCPCS | Performed by: STUDENT IN AN ORGANIZED HEALTH CARE EDUCATION/TRAINING PROGRAM

## 2024-01-29 PROCEDURE — 77066 DX MAMMO INCL CAD BI: CPT | Performed by: STUDENT IN AN ORGANIZED HEALTH CARE EDUCATION/TRAINING PROGRAM

## 2024-01-29 PROCEDURE — 76642 ULTRASOUND BREAST LIMITED: CPT | Mod: RT | Performed by: STUDENT IN AN ORGANIZED HEALTH CARE EDUCATION/TRAINING PROGRAM

## 2024-04-16 ENCOUNTER — NURSE TRIAGE (OUTPATIENT)
Dept: FAMILY MEDICINE | Facility: CLINIC | Age: 54
End: 2024-04-16
Payer: COMMERCIAL

## 2024-04-16 ENCOUNTER — OFFICE VISIT (OUTPATIENT)
Dept: URGENT CARE | Facility: URGENT CARE | Age: 54
End: 2024-04-16
Payer: COMMERCIAL

## 2024-04-16 VITALS
BODY MASS INDEX: 25.61 KG/M2 | OXYGEN SATURATION: 98 % | WEIGHT: 144.6 LBS | DIASTOLIC BLOOD PRESSURE: 82 MMHG | HEART RATE: 90 BPM | SYSTOLIC BLOOD PRESSURE: 121 MMHG | TEMPERATURE: 97.1 F

## 2024-04-16 DIAGNOSIS — R05.1 ACUTE COUGH: ICD-10-CM

## 2024-04-16 DIAGNOSIS — M94.0 COSTOCHONDRITIS: ICD-10-CM

## 2024-04-16 DIAGNOSIS — J02.9 SORE THROAT: ICD-10-CM

## 2024-04-16 DIAGNOSIS — B97.89 VIRAL RESPIRATORY ILLNESS: Primary | ICD-10-CM

## 2024-04-16 DIAGNOSIS — J98.8 VIRAL RESPIRATORY ILLNESS: Primary | ICD-10-CM

## 2024-04-16 LAB
DEPRECATED S PYO AG THROAT QL EIA: NEGATIVE
FLUAV AG SPEC QL IA: NEGATIVE
FLUBV AG SPEC QL IA: NEGATIVE

## 2024-04-16 PROCEDURE — 87651 STREP A DNA AMP PROBE: CPT | Performed by: PHYSICIAN ASSISTANT

## 2024-04-16 PROCEDURE — 87804 INFLUENZA ASSAY W/OPTIC: CPT | Performed by: PHYSICIAN ASSISTANT

## 2024-04-16 PROCEDURE — 99214 OFFICE O/P EST MOD 30 MIN: CPT | Performed by: PHYSICIAN ASSISTANT

## 2024-04-16 RX ORDER — NAPROXEN 500 MG/1
500 TABLET ORAL 2 TIMES DAILY WITH MEALS
Qty: 20 TABLET | Refills: 0 | Status: SHIPPED | OUTPATIENT
Start: 2024-04-16 | End: 2024-04-26

## 2024-04-16 RX ORDER — BENZONATATE 100 MG/1
100 CAPSULE ORAL 3 TIMES DAILY PRN
Qty: 30 CAPSULE | Refills: 0 | Status: SHIPPED | OUTPATIENT
Start: 2024-04-16 | End: 2024-04-22

## 2024-04-16 NOTE — PROGRESS NOTES
Chief Complaint   Patient presents with    Otalgia     Onset Thursday- right ear     Throat Pain     Onset Saturday    Cold Symptoms     Cough - chest pain from coughing, runny nose, nasal congestion onset Saturday, pt has take otc medication did not help    Nasal Congestion    Cough             Results for orders placed or performed in visit on 04/16/24   Streptococcus A Rapid Screen w/Reflex to PCR - Clinic Collect     Status: Normal    Specimen: Throat; Swab   Result Value Ref Range    Group A Strep antigen Negative Negative   Influenza A & B Antigen - Clinic Collect     Status: Normal    Specimen: Nose; Swab   Result Value Ref Range    Influenza A antigen Negative Negative    Influenza B antigen Negative Negative    Narrative    Test results must be correlated with clinical data. If necessary, results should be confirmed by a molecular assay or viral culture.           ASSESSMENT:     ICD-10-CM    1. Viral respiratory illness  J98.8     B97.89       2. Costochondritis  M94.0       3. Sore throat  J02.9 Streptococcus A Rapid Screen w/Reflex to PCR - Clinic Collect     Influenza A & B Antigen - Clinic Collect     Group A Streptococcus PCR Throat Swab      4. Acute cough  R05.1 Streptococcus A Rapid Screen w/Reflex to PCR - Clinic Collect     Influenza A & B Antigen - Clinic Collect     Group A Streptococcus PCR Throat Swab            PLAN: Respiratory illness with cough x 5 days.  Tessalon Perles.  Costochondritis, naproxen.  I have discussed clinical findings with patient.  Side effects of medications discussed.  Symptomatic care is discussed.  I have discussed the possibility of  worsening symptoms and indication to RTC or go to the ER if they occur.  All questions are answered, patient indicates understanding of these issues and is in agreement with plan.   Patient care instructions are discussed/given at the end of visit.   Lots of rest and fluids.      Sheila Cordoba PA-C      SUBJECTIVE:    53-year-old  female with onset of right ear pain 5 days ago.  Sore throat, cough, nasal congestion that started 3 days ago.  Sternal chest area hurts with deep breath or coughing.  Negative home COVID test.  No fever.  No vomiting or diarrhea.    Allergies   Allergen Reactions    Atorvastatin Muscle Pain (Myalgia)    Medrol [Methylprednisolone] Itching    Promethazine Itching    Vicodin [Hydrocodone-Acetaminophen]      itchy       Past Medical History:   Diagnosis Date    Adjustment disorder with depressed mood 1/19/2016    Breast fibroadenoma, right 2016    excised    Complication of anesthesia     nausea/ use scop. patches    H/O: hysterectomy 2013    Hypothyroidism 2005    Now resolved    Leiomyoma of uterus, unspecified 2013    resolved    Menorrhagia 2000    resolved    Migraine headache 10/2/2012    Recurrent UTI 2012    TBI (traumatic brain injury), without LOC, sequela 9/12/2017    Vitamin B 12 deficiency 2005    Resolved       Current Outpatient Medications   Medication Sig Dispense Refill    ibuprofen (ADVIL/MOTRIN) 600 MG tablet Take 1 tablet (600 mg) by mouth every 8 hours as needed for moderate pain Take with food 30 tablet 0    Multiple Vitamins-Minerals (HAIR SKIN NAILS) CAPS       lidocaine, viscous, (XYLOCAINE) 2 % solution Swish and spit 5 mLs in mouth every 3 hours as needed for pain ; Max 8 doses/24 hour period. (Patient not taking: Reported on 1/19/2024) 100 mL 0     No current facility-administered medications for this visit.       Social History     Tobacco Use    Smoking status: Never    Smokeless tobacco: Never   Substance Use Topics    Alcohol use: No     Alcohol/week: 0.0 standard drinks of alcohol       ROS:  CONSTITUTIONAL: Negative for fatigue or fever.  EYES: Negative for eye problems.  ENT: As above.  RESP: As above.  CV: Negative for chest pains.  GI: Negative for vomiting.  MUSCULOSKELETAL:  Negative for significant muscle or joint pains.  NEUROLOGIC: Negative for headaches.  SKIN: Negative for  rash.  PSYCH: Normal mentation for age.    OBJECTIVE:  /82 (BP Location: Left arm, Patient Position: Sitting, Cuff Size: Adult Regular)   Pulse 90   Temp 97.1  F (36.2  C) (Tympanic)   Wt 65.6 kg (144 lb 9.6 oz)   LMP 06/10/2013 (Exact Date)   SpO2 98%   BMI 25.61 kg/m    GENERAL APPEARANCE: Healthy, alert and no distress.  EYES:Conjunctiva/sclera clear.  EARS: No cerumen.   Ear canals w/o erythema.  TM's intact w/o erythema.    THROAT: Mild erythema w/o tonsillar enlargement . No exudates.  NECK: Supple, nontender, no lymphadenopathy.  RESP: Lungs clear to auscultation - no rales, rhonchi or wheezes  CV: Regular rate and rhythm, normal S1 S2, no murmur noted.  NEURO: Awake, alert    SKIN: No rashes  Chest wall-localized tenderness left sternal border.    GLORIA MirC

## 2024-04-16 NOTE — TELEPHONE ENCOUNTER
New Medication Request        What medication are you requesting?: azithromycin (ZITHROMAX) 250 MG tablet     Reason for medication request: Not feeling well. Patient recalls this helps    Have you taken this medication before?: Yes:    Controlled Substance Agreement on file:   CSA -- Patient Level:    CSA: None found at the patient level.         Patient offered an appointment? No    Preferred Pharmacy:   [id number] - [facility] [address] [place] [address]  [address] [place] [id number]  Phone: [phone] Fax: [phone]      Could we send this information to you in [facility] or would you prefer to receive a phone call?:   Patient would prefer a phone call   Okay to leave a detailed message?: Yes at Cell number on file:    Telephone Information:   Mobile [phone]      Stefano Villalta

## 2024-04-16 NOTE — TELEPHONE ENCOUNTER
"Called patient to triage.    Patient states on Thursday she started noticing her R ear was hurting, became quite painful, and then she started having additional symptoms such as throat pain, congestion/runny nose, dry cough, no energy, feeling hot/cold, and feeling like the congestion/infection is \"going into her chest.\" Patient denies any chest pain or SOB, states she is breathing normally. Current ear pain is 2/10, feels this is improving, but not sure why Patient also hasn't taken at home COVID test, did have sick exposure to another person at [employer] who had similar symptoms, unknown what caused other persons' symptoms.     Recommended that patient first take an at home COVID test to see if this may be causing symptoms. If positive and would like to discuss Paxlovid tx, can call clinic back to speak with RN. Otherwise, would recommend going to  today to be assessed, as no appts available in clinic today. Patient verbalized understanding, no further questions or concerns.      MIRANDA Lawson, RN  [facility]    Reason for Disposition   Earache    Additional Information   Negative: SEVERE difficulty breathing (e.g., struggling for each breath, speaks in single words)   Negative: Very weak (can't stand)   Negative: Sounds like a life-threatening emergency to the triager   Negative: Difficulty breathing and not from stuffy nose (e.g., not relieved by cleaning out the nose)   Negative: Runny nose is caused by pollen or other allergies   Negative: Cough is main symptom   Negative: Sore throat is main symptom   Negative: Patient sounds very sick or weak to the triager   Negative: Fever > 103 F (39.4 C)   Negative: Fever > 101 F (38.3 C) and over 60 years of age   Negative: Fever > 100.0 F (37.8 C) and has diabetes mellitus or a weak immune system (e.g., HIV positive, cancer chemotherapy, organ transplant, splenectomy, chronic steroids)   Negative: Fever > 100.0 F (37.8 C) and bedridden " "(e.g., CVA, chronic illness, recovering from surgery)   Negative: Fever present > 3 days (72 hours)   Negative: Fever returns after gone for over 24 hours and symptoms worse or not improved   Negative: Sinus pain (not just congestion) and fever    Answer Assessment - Initial Assessment Questions  1. ONSET: \"When did the nasal discharge start?\"       Thursday/Friday, maybe over the weekend - patient not sure  2. AMOUNT: \"How much discharge is there?\"       On and off, goes from lots of tan drainage to congested  3. COUGH: \"Do you have a cough?\" If Yes, ask: \"Describe the color of your sputum\" (clear, white, yellow, green)      Yes, cough is dry  4. RESPIRATORY DISTRESS: \"Describe your breathing.\"       Normal  5. FEVER: \"Do you have a fever?\" If Yes, ask: \"What is your temperature, how was it measured, and when did it start?\"      Not sure, hasn't checked but feels hot and cold  6. SEVERITY: \"Overall, how bad are you feeling right now?\" (e.g., doesn't interfere with normal activities, staying home from school/work, staying in bed)       Not feeling well, interfering with activities, staying in bed  7. OTHER SYMPTOMS: \"Do you have any other symptoms?\" (e.g., sore throat, earache, wheezing, vomiting)      Sore throat, ear ache, no energy, feels \"the sickness is moving into chest\"  8. PREGNANCY: \"Is there any chance you are pregnant?\" \"When was your last menstrual period?\"      Not asked    Protocols used: Common Cold-A-[place]    "

## 2024-04-16 NOTE — TELEPHONE ENCOUNTER
Please call and triage patient's current symptoms.     Koki Alicea, SIOBHANN, RN   [facility]

## 2024-04-17 LAB — GROUP A STREP BY PCR: NOT DETECTED

## 2024-04-22 ENCOUNTER — OFFICE VISIT (OUTPATIENT)
Dept: FAMILY MEDICINE | Facility: CLINIC | Age: 54
End: 2024-04-22
Payer: COMMERCIAL

## 2024-04-22 VITALS
HEART RATE: 75 BPM | SYSTOLIC BLOOD PRESSURE: 130 MMHG | BODY MASS INDEX: 25.62 KG/M2 | WEIGHT: 144.6 LBS | OXYGEN SATURATION: 100 % | TEMPERATURE: 97.2 F | HEIGHT: 63 IN | DIASTOLIC BLOOD PRESSURE: 83 MMHG | RESPIRATION RATE: 16 BRPM

## 2024-04-22 DIAGNOSIS — K59.04 CHRONIC IDIOPATHIC CONSTIPATION: ICD-10-CM

## 2024-04-22 DIAGNOSIS — R10.84 ABDOMINAL PAIN, GENERALIZED: Primary | ICD-10-CM

## 2024-04-22 DIAGNOSIS — R10.2 PELVIC PAIN IN FEMALE: ICD-10-CM

## 2024-04-22 DIAGNOSIS — Z11.3 ROUTINE SCREENING FOR STI (SEXUALLY TRANSMITTED INFECTION): ICD-10-CM

## 2024-04-22 PROCEDURE — 87491 CHLMYD TRACH DNA AMP PROBE: CPT

## 2024-04-22 PROCEDURE — 87591 N.GONORRHOEAE DNA AMP PROB: CPT

## 2024-04-22 PROCEDURE — 99214 OFFICE O/P EST MOD 30 MIN: CPT

## 2024-04-22 PROCEDURE — 87210 SMEAR WET MOUNT SALINE/INK: CPT

## 2024-04-22 ASSESSMENT — PAIN SCALES - GENERAL: PAINLEVEL: MODERATE PAIN (4)

## 2024-04-22 NOTE — PROGRESS NOTES
"  Assessment & Plan     Abdominal pain, generalized  Acute, started yesterday.  Exam without distention or evidence of acute abdomen.  Suspect this is a exacerbation of her chronic constipation.  Advised increasing water continuing fiber, increasing MiraLAX and if symptoms do not improve in a few days we will get an abdominal ultrasound.  Patient advised to send me a [employer] message if things or not improving.  Given no testing since her most    Pelvic pain in female  Acute, no vaginal symptoms but considering she has not had testing since her most recent partner advised ruling out GC and wet prep. Prior benign hysterectomy.  - Chlamydia trachomatis/Neisseria gonorrhoeae by PCR  - Wet preparation    Chronic idiopathic constipation  Chronic constipation, uses daily fiber and MiraLAX normally pretty well-controlled but she still does have exacerbations.    Routine screening for STI (sexually transmitted infection)  No testing since her most recent partner advised routine testing.  - HIV Antigen Antibody Combo Cascade  - Treponema Abs w Reflex to RPR and Titer  - Hepatitis B surface antigen  - Hepatitis B Surface Antibody              BMI  Estimated body mass index is 25.61 kg/m  as calculated from the following:    Height as of this encounter: 1.6 m (5' 3\").    Weight as of this encounter: 65.6 kg (144 lb 9.6 oz).         See Patient Instructions    Geraldine Gallego is a 53 year old, presenting for the following health issues:  Gastrointestinal Problem (/)        4/22/2024    12:40 PM   Additional Questions   Roomed by Alfonso THOMPSON MA         4/22/2024    12:40 PM   Patient Reported Additional Medications   Patient reports taking the following new medications magnesium, probiotic     History of Present Illness       Reason for visit:  Stomach ache/little cramping  Symptom onset:  1-3 days ago  Symptoms include:  Ache and light cramp  Symptom intensity:  Mild  Symptom progression:  Staying the same  Had these " "symptoms before:  No  What makes it worse:  Not sure  What makes it better:  Not sure    She eats 0-1 servings of fruits and vegetables daily.She consumes 1 sweetened beverage(s) daily.She exercises with enough effort to increase her heart rate 20 to 29 minutes per day.  She exercises with enough effort to increase her heart rate 3 or less days per week.   She is taking medications regularly.     Did not use protection with new partner of 9 months.   Lower abdominal mild-moderate pain started yesterday    Had hysterectomy, has ovaries but no uterus.   No vaginal or urinary concerns.   No pain with intercourse.     Hx of constipation, takes miralax every night, fiber every morning.   Endorses could drink more water.     Pain just started yesterday.   Hasn't eaten today so unsure if any change with meals  Had a large serving of cheese curds which is particularly greasy and fatty compared to her normal diet.    Went for long walk yesterdya and was extremely thirsty after.      Pain is 4.5/10   Throbbing/comes and goes.     No fevers, chills.   No hot flashes, fatigue.         Current Outpatient Medications   Medication Instructions    ibuprofen (ADVIL/MOTRIN) 600 mg, Oral, EVERY 8 HOURS PRN, Take with food    lidocaine, viscous, (XYLOCAINE) 2 % solution 5 mLs, Swish & Spit, EVERY 3 HOURS PRN, ; Max 8 doses/24 hour period.    Multiple Vitamins-Minerals (HAIR SKIN NAILS) CAPS Oral    naproxen (NAPROSYN) 500 mg, Oral, 2 TIMES DAILY WITH MEALS            Review of Systems  Constitutional, HEENT, cardiovascular, pulmonary, gi and gu systems are negative, except as otherwise noted.      Objective    /83 (BP Location: Left arm, Patient Position: Sitting, Cuff Size: Adult Regular)   Pulse 75   Temp 97.2  F (36.2  C) (Oral)   Resp 16   Ht 1.6 m (5' 3\")   Wt 65.6 kg (144 lb 9.6 oz)   LMP 06/10/2013 (Exact Date)   SpO2 100%   Breastfeeding No   BMI 25.61 kg/m    Body mass index is 25.61 kg/m .  Physical Exam "   GENERAL: alert and no distress  NECK: no adenopathy, no asymmetry, masses, or scars  RESP: lungs clear to auscultation - no rales, rhonchi or wheezes  CV: regular rate and rhythm, normal S1 S2, no S3 or S4, no murmur, click or rub, no peripheral edema  ABDOMEN: soft, generalized tenderness, no hepatosplenomegaly, no masses and bowel sounds hypoactive  MS: no gross musculoskeletal defects noted, no edema    No results found for this or any previous visit (from the past 24 hour(s)).        Signed Electronically by: KENNEDI Casas CNP

## 2024-04-22 NOTE — PATIENT INSTRUCTIONS
Flonase (nasal spray) - congestion    Increase miralax dose, continue fiber, increase water    If not improving over next few days, send mychart message and I'll order an abdominal ultrasound.

## 2024-04-23 ENCOUNTER — MYC MEDICAL ADVICE (OUTPATIENT)
Dept: FAMILY MEDICINE | Facility: CLINIC | Age: 54
End: 2024-04-23
Payer: COMMERCIAL

## 2024-04-23 DIAGNOSIS — R10.2 PELVIC PAIN IN FEMALE: Primary | ICD-10-CM

## 2024-04-23 LAB
C TRACH DNA SPEC QL PROBE+SIG AMP: NEGATIVE
N GONORRHOEA DNA SPEC QL NAA+PROBE: NEGATIVE

## 2024-04-24 NOTE — TELEPHONE ENCOUNTER
Routing to provider    Please review lab from 4/22. Pt has questions regarding the higher WBC for her swab      EPI Irving    Triage Nurse  [facility]

## 2024-04-24 NOTE — TELEPHONE ENCOUNTER
Routing to Raúl Roberto    Patient requesting script for Yeast infection    Office visit 4/23    Pelvic pain in female  Acute, no vaginal symptoms but considering she has not had testing since her most recent partner advised ruling out GC and wet prep. Prior benign hysterectomy.  - Chlamydia trachomatis/Neisseria gonorrhoeae by PCR  - Wet preparation    Stevie Powell RN, BSN, PHN  [facility]

## 2024-04-25 NOTE — TELEPHONE ENCOUNTER
It is difficult for me to comprehensively comment on labs ordered by someone else since I did not evaluate the patient.  However, I can make the following recommendations:    -the wet prep did not show a yeast infection.  -patient can use over the counter Monistat   -the WBC on the wet prep are a common finding. This is just from the swab coming in contact with skin cells during collection and does not indicate an infection..      -If increased abdominal pain, fever over 101 F, emesis, rectal bleeding, black stools , then needs to be seen in emergency room.

## 2024-04-25 NOTE — TELEPHONE ENCOUNTER
Patient calling about previous message below. Patient Is requesting this message be routed to PCP due to no response from provider. Patient states she is uncomfortable with current symptoms and cannot wait any longer.    Stefano Villalta

## 2024-04-29 NOTE — TELEPHONE ENCOUNTER
Patient is calling in stating that she is having frequent urination and lower abdominal pain has increased on right hand side and would like for the provider who evaluated her to prescribe something for a bladder infection/UTI, even if she needs to come back in for a urine sample only instead of doing another office visit. Please call.

## 2024-04-30 ENCOUNTER — LAB (OUTPATIENT)
Dept: LAB | Facility: CLINIC | Age: 54
End: 2024-04-30
Payer: COMMERCIAL

## 2024-04-30 DIAGNOSIS — R10.2 PELVIC PAIN IN FEMALE: ICD-10-CM

## 2024-04-30 LAB
ALBUMIN UR-MCNC: NEGATIVE MG/DL
APPEARANCE UR: CLEAR
BACTERIA #/AREA URNS HPF: ABNORMAL /HPF
BILIRUB UR QL STRIP: NEGATIVE
COLOR UR AUTO: YELLOW
GLUCOSE UR STRIP-MCNC: NEGATIVE MG/DL
HGB UR QL STRIP: NEGATIVE
KETONES UR STRIP-MCNC: NEGATIVE MG/DL
LEUKOCYTE ESTERASE UR QL STRIP: ABNORMAL
MUCOUS THREADS #/AREA URNS LPF: PRESENT /LPF
NITRATE UR QL: NEGATIVE
PH UR STRIP: 5.5 [PH] (ref 5–7)
RBC #/AREA URNS AUTO: ABNORMAL /HPF
SP GR UR STRIP: >=1.03 (ref 1–1.03)
SQUAMOUS #/AREA URNS AUTO: ABNORMAL /LPF
UROBILINOGEN UR STRIP-ACNC: 0.2 E.U./DL
WBC #/AREA URNS AUTO: ABNORMAL /HPF

## 2024-04-30 PROCEDURE — 87086 URINE CULTURE/COLONY COUNT: CPT

## 2024-04-30 PROCEDURE — 81001 URINALYSIS AUTO W/SCOPE: CPT

## 2024-04-30 RX ORDER — CIPROFLOXACIN 500 MG/1
500 TABLET, FILM COATED ORAL 2 TIMES DAILY
Qty: 10 TABLET | Refills: 0 | Status: SHIPPED | OUTPATIENT
Start: 2024-04-30 | End: 2024-05-02

## 2024-04-30 NOTE — TELEPHONE ENCOUNTER
Reviewed labs.   Sent abx to tx bladder or kidney infection.   Continue plan to follow up on 5/2.

## 2024-04-30 NOTE — TELEPHONE ENCOUNTER
Please call pt. Ordered a urine test.   Her wet prep was normal. + WBC is incidental/nonspecific finding. Its normal to have some WBC in vaginal discharge as a normal part of immune system.     She can schedule a LAB appointment to get this done at her next earliest convenience.     Please have her set up a virtual follow up appointment with me this week to discuss her ongoing symptoms.     Also I agree with Dr. Mix recommendation about ER criteria.

## 2024-04-30 NOTE — TELEPHONE ENCOUNTER
RN called and spoke with patient    RN reviewed providers message and recommendations.    RN assisted in scheduling appointments.    Patient verbalized understanding.    Stevie Powell RN, BSN, PHN  [facility]

## 2024-05-02 ENCOUNTER — VIRTUAL VISIT (OUTPATIENT)
Dept: FAMILY MEDICINE | Facility: CLINIC | Age: 54
End: 2024-05-02
Payer: COMMERCIAL

## 2024-05-02 DIAGNOSIS — R82.90 ABNORMAL URINE: ICD-10-CM

## 2024-05-02 DIAGNOSIS — R10.2 PELVIC PAIN IN FEMALE: Primary | ICD-10-CM

## 2024-05-02 LAB — BACTERIA UR CULT: NO GROWTH

## 2024-05-02 PROCEDURE — 99214 OFFICE O/P EST MOD 30 MIN: CPT | Mod: 95

## 2024-05-02 NOTE — PROGRESS NOTES
"Lashonda is a 53 year old who is being evaluated via a billable video visit.    How would you like to obtain your AVS? MyChart  If the video visit is dropped, the invitation should be resent by: Text to cell phone: [phone]  Will anyone else be joining your video visit? No      Assessment & Plan     Pelvic pain in female  Acute, this has resolved though etiology is unclear. DDx includes constipation, gas, nephrolithiasis. Her follow up urine culture was negative for bacterial growth. Advised on reasons to follow up if pain returns.   - UA with Microscopic reflex to Culture - lab collect  - Basic metabolic panel  (Ca, Cl, CO2, Creat, Gluc, K, Na, BUN)  - Magnesium    Abnormal urine  Recheck urine in 3-4 weeks to confirm back to normal and normal kidney function. Will check mag level since pt is taking supplement.   - UA with Microscopic reflex to Culture - lab collect  - Basic metabolic panel  (Ca, Cl, CO2, Creat, Gluc, K, Na, BUN)  - Magnesium              BMI  Estimated body mass index is 25.61 kg/m  as calculated from the following:    Height as of 4/22/24: 1.6 m (5' 3\").    Weight as of 4/22/24: 65.6 kg (144 lb 9.6 oz).         See Patient Instructions  Abnormal urine     Subjective   Lashonda is a 53 year old, presenting for the following health issues:  RECHECK        5/2/2024     4:14 PM   Additional Questions   Roomed by Alfonso THOMPSON MA     HPI     Patient says pain is resolved.   Mom has history of kidney stones.   Pt has never had one before.     2 bottles of water per day    Vitamins/supplements pt takes daily:  MVI from [employer]  Flaxseed oil  Biotin  Vitamin C  Magnesium  Alpha lipoic acid  Probiotics             Review of Systems  Constitutional, HEENT, cardiovascular, pulmonary, gi and gu systems are negative, except as otherwise noted.      Objective           Vitals:  No vitals were obtained today due to virtual visit.    Physical Exam   GENERAL: alert and no distress  EYES: Eyes grossly normal to " inspection.  No discharge or erythema, or obvious scleral/conjunctival abnormalities.  RESP: No audible wheeze, cough, or visible cyanosis.    SKIN: Visible skin clear. No significant rash, abnormal pigmentation or lesions.  NEURO: Cranial nerves grossly intact.  Mentation and speech appropriate for age.  PSYCH: Appropriate affect, tone, and pace of words    Lab on 04/30/2024   Component Date Value Ref Range Status    Color Urine 04/30/2024 Yellow  Colorless, Straw, Light Yellow, Yellow Final    Appearance Urine 04/30/2024 Clear  Clear Final    Glucose Urine 04/30/2024 Negative  Negative mg/dL Final    Bilirubin Urine 04/30/2024 Negative  Negative Final    Ketones Urine 04/30/2024 Negative  Negative mg/dL Final    Specific Gravity Urine 04/30/2024 >=1.030  1.003 - 1.035 Final    Blood Urine 04/30/2024 Negative  Negative Final    pH Urine 04/30/2024 5.5  5.0 - 7.0 Final    Protein Albumin Urine 04/30/2024 Negative  Negative mg/dL Final    Urobilinogen Urine 04/30/2024 0.2  0.2, 1.0 E.U./dL Final    Nitrite Urine 04/30/2024 Negative  Negative Final    Leukocyte Esterase Urine 04/30/2024 Trace (A)  Negative Final    Bacteria Urine 04/30/2024 Moderate (A)  None Seen /HPF Final    RBC Urine 04/30/2024 0-2  0-2 /HPF /HPF Final    WBC Urine 04/30/2024 5-10 (A)  0-5 /HPF /HPF Final    Squamous Epithelials Urine 04/30/2024 Few (A)  None Seen /LPF Final    Mucus Urine 04/30/2024 Present (A)  None Seen /LPF Final    Culture 04/30/2024 No Growth   Final         Video-Visit Details    Type of service:  Video Visit   Originating Location (pt. Location): Home    Distant Location (provider location):  On-site  Platform used for Video Visit: Sindi  Signed Electronically by: KENNEDI Casas CNP

## 2024-05-02 NOTE — PATIENT INSTRUCTIONS
Your urine culture showed there was no bladder infection. You do not need to continue any antibiotics.     You may have passed a kidney stone. If this pain returns, please schedule a follow up. If you cannot get in same day, go to an urgent care so you can get same day imaging.     Stop / reduce vitamin C supplement. This can contribute to calcium stones forming in the kidney.    Schedule lab only visit to recheck urine, magnesium, kidney levels, calcium. 1 month (around end of May).

## 2024-05-31 ENCOUNTER — LAB (OUTPATIENT)
Dept: LAB | Facility: CLINIC | Age: 54
End: 2024-05-31
Payer: COMMERCIAL

## 2024-05-31 DIAGNOSIS — R82.90 ABNORMAL URINE: ICD-10-CM

## 2024-05-31 DIAGNOSIS — Z11.3 ROUTINE SCREENING FOR STI (SEXUALLY TRANSMITTED INFECTION): ICD-10-CM

## 2024-05-31 DIAGNOSIS — R10.2 PELVIC PAIN IN FEMALE: ICD-10-CM

## 2024-05-31 LAB
ALBUMIN UR-MCNC: NEGATIVE MG/DL
APPEARANCE UR: CLEAR
BILIRUB UR QL STRIP: NEGATIVE
CAOX CRY #/AREA URNS HPF: ABNORMAL /HPF
COLOR UR AUTO: YELLOW
GLUCOSE UR STRIP-MCNC: NEGATIVE MG/DL
HGB UR QL STRIP: NEGATIVE
KETONES UR STRIP-MCNC: NEGATIVE MG/DL
LEUKOCYTE ESTERASE UR QL STRIP: NEGATIVE
MUCOUS THREADS #/AREA URNS LPF: PRESENT /LPF
NITRATE UR QL: NEGATIVE
PH UR STRIP: 6.5 [PH] (ref 5–7)
RBC #/AREA URNS AUTO: ABNORMAL /HPF
SP GR UR STRIP: 1.02 (ref 1–1.03)
SQUAMOUS #/AREA URNS AUTO: ABNORMAL /LPF
UROBILINOGEN UR STRIP-ACNC: 0.2 E.U./DL
WBC #/AREA URNS AUTO: ABNORMAL /HPF

## 2024-05-31 PROCEDURE — 87389 HIV-1 AG W/HIV-1&-2 AB AG IA: CPT

## 2024-05-31 PROCEDURE — 87340 HEPATITIS B SURFACE AG IA: CPT

## 2024-05-31 PROCEDURE — 86706 HEP B SURFACE ANTIBODY: CPT

## 2024-05-31 PROCEDURE — 36415 COLL VENOUS BLD VENIPUNCTURE: CPT

## 2024-05-31 PROCEDURE — 81001 URINALYSIS AUTO W/SCOPE: CPT

## 2024-05-31 PROCEDURE — 86780 TREPONEMA PALLIDUM: CPT

## 2024-05-31 PROCEDURE — 83735 ASSAY OF MAGNESIUM: CPT

## 2024-05-31 PROCEDURE — 80048 BASIC METABOLIC PNL TOTAL CA: CPT

## 2024-06-01 LAB — T PALLIDUM AB SER QL: NONREACTIVE

## 2024-06-02 LAB
ANION GAP SERPL CALCULATED.3IONS-SCNC: 8 MMOL/L (ref 7–15)
BUN SERPL-MCNC: 12.1 MG/DL (ref 6–20)
CALCIUM SERPL-MCNC: 9.2 MG/DL (ref 8.6–10)
CHLORIDE SERPL-SCNC: 105 MMOL/L (ref 98–107)
CREAT SERPL-MCNC: 0.98 MG/DL (ref 0.51–0.95)
DEPRECATED HCO3 PLAS-SCNC: 25 MMOL/L (ref 22–29)
EGFRCR SERPLBLD CKD-EPI 2021: 70 ML/MIN/1.73M2
GLUCOSE SERPL-MCNC: 78 MG/DL (ref 70–99)
HBV SURFACE AB SERPL IA-ACNC: <3.5 M[IU]/ML
HBV SURFACE AB SERPL IA-ACNC: NONREACTIVE M[IU]/ML
HBV SURFACE AG SERPL QL IA: NONREACTIVE
HIV 1+2 AB+HIV1 P24 AG SERPL QL IA: NONREACTIVE
MAGNESIUM SERPL-MCNC: 2.1 MG/DL (ref 1.7–2.3)
POTASSIUM SERPL-SCNC: 4 MMOL/L (ref 3.4–5.3)
SODIUM SERPL-SCNC: 138 MMOL/L (ref 135–145)

## 2024-06-28 ENCOUNTER — OFFICE VISIT (OUTPATIENT)
Dept: URGENT CARE | Facility: URGENT CARE | Age: 54
End: 2024-06-28
Payer: COMMERCIAL

## 2024-06-28 VITALS
HEART RATE: 84 BPM | WEIGHT: 149.6 LBS | DIASTOLIC BLOOD PRESSURE: 77 MMHG | OXYGEN SATURATION: 100 % | SYSTOLIC BLOOD PRESSURE: 125 MMHG | BODY MASS INDEX: 26.5 KG/M2 | RESPIRATION RATE: 16 BRPM | TEMPERATURE: 98.7 F

## 2024-06-28 DIAGNOSIS — H10.32 ACUTE BACTERIAL CONJUNCTIVITIS OF LEFT EYE: Primary | ICD-10-CM

## 2024-06-28 PROCEDURE — 99213 OFFICE O/P EST LOW 20 MIN: CPT | Performed by: STUDENT IN AN ORGANIZED HEALTH CARE EDUCATION/TRAINING PROGRAM

## 2024-06-28 RX ORDER — OFLOXACIN 3 MG/ML
1-2 SOLUTION/ DROPS OPHTHALMIC 4 TIMES DAILY
Qty: 10 ML | Refills: 0 | Status: SHIPPED | OUTPATIENT
Start: 2024-06-28 | End: 2024-07-05

## 2024-06-28 NOTE — PATIENT INSTRUCTIONS
Use ofloxacin drops as directed.  Do not wear contacts until you finish treatment, then start with a new pair of contacts.

## 2024-06-28 NOTE — LETTER
June 28, 2024      Lashonda Rivera  [address]  [facility] [address]        To Whom It May Concern:    Lashonda Rivera  was seen on 6/28/24.  Please excuse her absence.         Sincerely,        Ethel Fallon PA-C

## 2024-06-28 NOTE — PROGRESS NOTES
ASSESSMENT & PLAN:   Diagnoses and all orders for this visit:  Acute bacterial conjunctivitis of left eye  -     ofloxacin (OCUFLOX) 0.3 % ophthalmic solution; Place 1-2 drops Into the left eye 4 times daily for 7 days    Left eye redness and drainage x 1 day. Wears contacts. Ofloxacin drops x 7 days.    At the end of the encounter, I discussed results, diagnosis, medications. Discussed red flags for immediate return to clinic/ER, as well as indications for follow up if no improvement. Patient and/or caregiver understood and agreed to plan. Patient was stable for discharge.    Patient Instructions   Use ofloxacin drops as directed.  Do not wear contacts until you finish treatment, then start with a new pair of contacts.    No follow-ups on file.    ------------------------------------------------------------------------  SUBJECTIVE  History was obtained from patient.    Patient presents with:  Eye Problem: Left eye is crusty and painful since yesterday, usually wears contacts but took them out after irritation started     HPI  Lashonda Rivera is a(n) 53 year old female presenting to urgent care for left eye irritation since yesterday. She took her contacts out immediately. Her eye was crusted shut this morning. Eye feels irritated. No pain or vision changes. No known pinkeye exposure. She wears contacts.    Review of Systems    Current Outpatient Medications   Medication Sig Dispense Refill    ibuprofen (ADVIL/MOTRIN) 600 MG tablet Take 1 tablet (600 mg) by mouth every 8 hours as needed for moderate pain Take with food 30 tablet 0    Multiple Vitamins-Minerals (HAIR SKIN NAILS) CAPS       ofloxacin (OCUFLOX) 0.3 % ophthalmic solution Place 1-2 drops Into the left eye 4 times daily for 7 days 10 mL 0    lidocaine, viscous, (XYLOCAINE) 2 % solution Swish and spit 5 mLs in mouth every 3 hours as needed for pain ; Max 8 doses/24 hour period. 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  2021-08: Tendinitis  2017-10: Adnexal cyst  2017-09: TBI (traumatic brain injury), without LOC, sequela  2016-10: Seasonal allergic rhinitis, unspecified allergic rhinitis   trigger  2016-06: Fibroadenoma, right  2016-02: Unspecified intracranial injury with loss of consciousness   of unspecified duration, initial encounter (H)  2016-02: Low back pain  2016-02: Lumbago with sciatica, left side  2016-01: Adjustment disorder with depressed mood  2015-12: Cervical pain  2015-12: Bilateral thoracic back pain  2012-10: Migraine headache  2011-01: Tension headache  2010-10: CARDIOVASCULAR SCREENING; LDL GOAL LESS THAN 160  2009-11: Leiomyoma of uterus  2009-11: Excessive or frequent menstruation  2009-11: Female pelvic pain    Allergies   Allergen Reactions    Atorvastatin Muscle Pain (Myalgia)    Medrol [Methylprednisolone] Itching    Promethazine Itching    Vicodin [Hydrocodone-Acetaminophen]      itchy         OBJECTIVE  Vitals:    06/28/24 1403   BP: 125/77   BP Location: Left arm   Patient Position: Sitting   Cuff Size: Adult Regular   Pulse: 84   Resp: 16   Temp: 98.7  F (37.1  C)   TempSrc: Tympanic   SpO2: 100%   Weight: 67.9 kg (149 lb 9.6 oz)     Physical Exam   GENERAL: healthy, alert, no acute distress.   PSYCH: mentation appears normal. Normal affect  HEAD: normocephalic, atraumatic.  EYE: PERRL. EOMs intact. Left eye with scleral injection and small purulent drainage. No foreign body. No fluorescein uptake. Right eye with no scleral injection or drainage. Bilateral eyelids with no erythema or edema.    No results found for any visits on 06/28/24.

## 2024-09-23 ENCOUNTER — ANCILLARY PROCEDURE (OUTPATIENT)
Dept: GENERAL RADIOLOGY | Facility: CLINIC | Age: 54
End: 2024-09-23
Attending: EMERGENCY MEDICINE
Payer: COMMERCIAL

## 2024-09-23 ENCOUNTER — OFFICE VISIT (OUTPATIENT)
Dept: URGENT CARE | Facility: URGENT CARE | Age: 54
End: 2024-09-23
Payer: COMMERCIAL

## 2024-09-23 VITALS
WEIGHT: 152.9 LBS | HEART RATE: 85 BPM | OXYGEN SATURATION: 100 % | SYSTOLIC BLOOD PRESSURE: 131 MMHG | DIASTOLIC BLOOD PRESSURE: 85 MMHG | RESPIRATION RATE: 16 BRPM | BODY MASS INDEX: 27.09 KG/M2 | TEMPERATURE: 98.1 F

## 2024-09-23 DIAGNOSIS — S20.211A RIB CONTUSION, RIGHT, INITIAL ENCOUNTER: ICD-10-CM

## 2024-09-23 DIAGNOSIS — S20.211A RIB CONTUSION, RIGHT, INITIAL ENCOUNTER: Primary | ICD-10-CM

## 2024-09-23 PROCEDURE — 71101 X-RAY EXAM UNILAT RIBS/CHEST: CPT | Mod: TC | Performed by: RADIOLOGY

## 2024-09-23 PROCEDURE — 99214 OFFICE O/P EST MOD 30 MIN: CPT | Mod: 25 | Performed by: EMERGENCY MEDICINE

## 2024-09-23 PROCEDURE — 96372 THER/PROPH/DIAG INJ SC/IM: CPT | Performed by: EMERGENCY MEDICINE

## 2024-09-23 RX ORDER — KETOROLAC TROMETHAMINE 30 MG/ML
15 INJECTION, SOLUTION INTRAMUSCULAR; INTRAVENOUS ONCE
Status: COMPLETED | OUTPATIENT
Start: 2024-09-23 | End: 2024-09-23

## 2024-09-23 RX ORDER — NAPROXEN 500 MG/1
500 TABLET ORAL 2 TIMES DAILY WITH MEALS
Qty: 14 TABLET | Refills: 0 | Status: SHIPPED | OUTPATIENT
Start: 2024-09-23

## 2024-09-23 RX ORDER — LIDOCAINE 50 MG/G
1 PATCH TOPICAL EVERY 24 HOURS
Qty: 10 PATCH | Refills: 0 | Status: SHIPPED | OUTPATIENT
Start: 2024-09-23 | End: 2024-10-04

## 2024-09-23 RX ADMIN — KETOROLAC TROMETHAMINE 15 MG: 30 INJECTION, SOLUTION INTRAMUSCULAR; INTRAVENOUS at 11:29

## 2024-09-23 ASSESSMENT — PAIN SCALES - GENERAL: PAINLEVEL: EXTREME PAIN (9)

## 2024-09-23 NOTE — PROGRESS NOTES
Assessment & Plan     Diagnosis:    ICD-10-CM    1. Rib contusion, right, initial encounter  S20.211A XR Ribs & Chest Right G/E 3 Views     lidocaine (LIDODERM) 5 % patch     naproxen (NAPROSYN) 500 MG tablet     ketorolac (TORADOL) injection 15 mg        Medical Decision Making:  Lashonda Rivera is a 54 year old female presents for evaluation after right rib pain after slipping in the tub last night.  Signs and symptoms are consistent with a chest wall contusion.  A broad differential was considered including pneumothorax, rib fracture, hemothorax, sprain, strain, other fracture, nerve impingement/compromise, referred pain. CXR negative for fracture or pneumothorax on my read. O2 saturation is 100% on room air here. The sensitivity for rib fracture on chest xray was discussed with patient and if symptoms continue or progress may need CT of chest; I do not feel with the patients risk/benefit ratio and clinical symptoms this is required at this time.      Supportive outpatient management is indicated.  Rest, ice, pain medicine treatment was discussed with the patient. Close follow-up with patient's primary care physician per discharge precautions. Chest wall contusion discharge instructions given for home. Patient verbalizes understanding and agreement with the plan including reasons to go to the ER. All questions answered.       Donovan Braun PA-C  [facility]    Subjective     Lashonda Rivera is a 54 year old female who presents to clinic today for the following health issues:  Chief Complaint   Patient presents with    Fall     Fell last night in tub and hit right side of ribs, hurts to move and walk        HPI   Patient states that last night she excellently slipped in the tub and landed on her right ribs, hurts to bend twist even walking is painful. She has no difficulties breathing, but it is quite painful to take a deep breath, or cough or laugh.  She notes no abdominal pain, blood in  urine or stool, other injuries.    Review of Systems    See HPI    Objective      Vitals: /85 (BP Location: Left arm, Patient Position: Sitting, Cuff Size: Adult Regular)   Pulse 85   Temp 98.1  F (36.7  C) (Tympanic)   Resp 16   Wt 69.4 kg (152 lb 14.4 oz)   LMP 06/10/2013 (Exact Date)   SpO2 100%   BMI 27.09 kg/m        Patient Vitals for the past 24 hrs:   BP Temp Temp src Pulse Resp SpO2 Weight   09/23/24 1052 131/85 98.1  F (36.7  C) Tympanic 85 16 100 % 69.4 kg (152 lb 14.4 oz)       Vital signs reviewed by: Donovan Braun PA-C    Physical Exam   Constitutional: Patient is alert and cooperative. Mild acute distress.  Cardiovascular: Regular rate and rhythm  Pulmonary/Chest: Point tenderness over the right lower/lateral rubs anteriorly. No crepitus or flail chest. Lungs are clear to auscultation throughout. Effort normal. No respiratory distress. No wheezes, rales or rhonchi.  GI: Abdomen is soft and non-tender throughout. No CVA tenderness bilaterally. No hepatosplenomegaly.  Neurological: Alert and oriented x3.   Skin: No rash noted on visualized skin.  Psychiatric:The patient has a normal mood and affect.     Labs/Imaging:  Results for orders placed or performed in visit on 09/23/24   XR Ribs & Chest Right G/E 3 Views     Status: None    Narrative    EXAM: XR RIBS & CHEST RT 3VW  DATE/TIME: 9/23/2024 11:12 AM    INDICATION: Rib contusion, right, initial encounter  COMPARISON: CT 12/31/2021      Impression    IMPRESSION: The cardiac silhouette is normal in size and configuration  without pulmonary vascular congestion. No pleural effusion,  pneumothorax, or consolidation. No rib fractures.       NAOMI WHITTAKER DO         SYSTEM ID:  [id number]     Reading per radiology      Interventions:  Toradol 15mg IM    Donovan Braun PA-C, September 23, 2024

## 2024-09-23 NOTE — LETTER
September 23, 2024      Lashonda Rivera  [address]  [place] [address]        To Whom It May Concern:    Lashonda Rivera  was seen on 9/23/2024.  Please excuse her from work 9/23 - 9/25/2024 due to injury.        Sincerely,        Donovan Braun PA-C

## 2024-09-23 NOTE — PATIENT INSTRUCTIONS
Take deep breaths throughout the day to help prevent pneumonia. Good pain control is important. Monitor for new cough/fever, bloody sputum or worsening shortness of breath -- go to the ER if this happens. Otherwise if a fracture it may take 4-6 weeks to heal completely. Should be sooner if just a contusion.

## 2024-10-01 ENCOUNTER — TELEPHONE (OUTPATIENT)
Dept: URGENT CARE | Facility: URGENT CARE | Age: 54
End: 2024-10-01
Payer: COMMERCIAL

## 2024-10-01 NOTE — TELEPHONE ENCOUNTER
Medication Question or Refill        What medication are you calling about (include dose and sig)?: Lidocaine patch for rub pain    Preferred Pharmacy:   [id number] - [place], [place] - [address] [facility]  [address] [facility] [address]  Phone: [phone] Fax: [phone]    [id number] - [place], [place] - [address] AT [facility] [address] [address] [address] [place] [place] [address]  Phone: [phone] Fax: [phone]    [facility] - [place], [place] - [address]   [address] [facility] [address]  Phone: [phone] Fax: [phone] Alternate Fax: [phone]      Controlled Substance Agreement on file:   CSA -- Patient Level:    CSA: None found at the patient level.       Who prescribed the medication?: [facility]     Do you need a refill? Yes    When did you use the medication last? Current needs a 2nd refill    Patient offered an appointment? No    Do you have any questions or concerns?  No      Could we send this information to you in [facility] or would you prefer to receive a phone call?:   Patient would prefer a phone call   Okay to leave a detailed message?: Yes at Cell number on file:    Telephone Information:   Mobile [phone]

## 2024-10-01 NOTE — TELEPHONE ENCOUNTER
[facility] - [place], [place] - [address]   [address] [address]  Phone: [phone] Fax: [phone] Alternate Fax: [phone]     [facility] today

## 2024-10-04 ENCOUNTER — OFFICE VISIT (OUTPATIENT)
Dept: FAMILY MEDICINE | Facility: CLINIC | Age: 54
End: 2024-10-04
Payer: COMMERCIAL

## 2024-10-04 VITALS
RESPIRATION RATE: 18 BRPM | TEMPERATURE: 97.3 F | SYSTOLIC BLOOD PRESSURE: 127 MMHG | DIASTOLIC BLOOD PRESSURE: 85 MMHG | BODY MASS INDEX: 26.48 KG/M2 | WEIGHT: 149.5 LBS | HEART RATE: 90 BPM | OXYGEN SATURATION: 98 %

## 2024-10-04 DIAGNOSIS — S20.211A RIB CONTUSION, RIGHT, INITIAL ENCOUNTER: ICD-10-CM

## 2024-10-04 PROCEDURE — G2211 COMPLEX E/M VISIT ADD ON: HCPCS | Performed by: PREVENTIVE MEDICINE

## 2024-10-04 PROCEDURE — 99213 OFFICE O/P EST LOW 20 MIN: CPT | Performed by: PREVENTIVE MEDICINE

## 2024-10-04 RX ORDER — IBUPROFEN 600 MG/1
600 TABLET, FILM COATED ORAL EVERY 8 HOURS PRN
Qty: 30 TABLET | Refills: 0 | Status: SHIPPED | OUTPATIENT
Start: 2024-10-04

## 2024-10-04 RX ORDER — LIDOCAINE 50 MG/G
1 PATCH TOPICAL EVERY 24 HOURS
Qty: 20 PATCH | Refills: 0 | Status: SHIPPED | OUTPATIENT
Start: 2024-10-04

## 2024-10-04 RX ORDER — METHOCARBAMOL 500 MG/1
500 TABLET, FILM COATED ORAL 2 TIMES DAILY PRN
Qty: 20 TABLET | Refills: 0 | Status: SHIPPED | OUTPATIENT
Start: 2024-10-04

## 2024-10-04 ASSESSMENT — PAIN SCALES - GENERAL: PAINLEVEL: EXTREME PAIN (8)

## 2024-10-04 NOTE — PATIENT INSTRUCTIONS
At [facility], we strive to deliver an exceptional experience to you, every time we see you. If you receive a survey, please let us know what we are doing well and/or what we could improve upon, as we do value your feedback.  If you have MyChart, you can expect to receive results automatically within 24 hours of their completion.  Your provider will send a note interpreting your results as well.   If you do not have MyChart, you should receive your results in about a week by mail.    Your care team:                            Family Medicine Internal Medicine   MD Hussain Albright, MD Karen Lucas, MD Fred Capellan, MD Blossom Mcallister, PARossiC    Ralph Evans, MD Pediatrics   Asmita Bhatia, MD Sally Kyle, MD Sommer Seo, APRN CNP Kim Cedeno APRN CNP   MD Tiffani Keith, MD Aaliyah Dickinson, CNP     Raúl Pimentel, CNP Same-Day Provider (No follow-up visits)   KENNEDI Candelaria, DNP Liana Melvin, KENNEDI Shelton, FNP, BC GLORIA AragonC     Clinic hours: Monday - Thursday 7 am-6 pm; Fridays 7 am-5 pm.   Urgent care: Monday - Friday 10 am- 8 pm; Saturday and Sunday 9 am-5 pm.    Clinic: [phone]       [facility]: Monday - Thursday 8 am - 7 pm; Friday 8 am - 6 pm  [facility]: [phone]

## 2024-10-04 NOTE — PROGRESS NOTES
"  Assessment & Plan     Rib contusion, right, initial encounter  -no abdominal pain  -X rays negative for fractures  -Alternating ice and heat  -GI side effects of Ibuprofen reviewed   - ibuprofen (ADVIL/MOTRIN) 600 MG tablet  Dispense: 30 tablet; Refill: 0  - lidocaine (LIDODERM) 5 % patch  Dispense: 20 patch; Refill: 0  - methocarbamol (ROBAXIN) 500 MG tablet  Dispense: 20 tablet; Refill: 0, sedation side effect reviewed  -avoid heavy lifting       Will get vaccines later before she travels to [place] for vacation.       BMI  Estimated body mass index is 26.48 kg/m  as calculated from the following:    Height as of 4/22/24: 1.6 m (5' 3\").    Weight as of this encounter: 67.8 kg (149 lb 8 oz).       Geraldine Gallego is a 54 year old, presenting for the following health issues:  Rib Injury        10/4/2024     9:19 AM   Additional Questions   Roomed by Ariana     History of Present Illness       Reason for visit:  Rib pain request lidican patches  Symptom onset:  1-2 weeks ago  Symptoms include:  Rib pain  Symptom intensity:  Moderate  Symptom progression:  Staying the same  Had these symptoms before:  No  What makes it worse:  Getting in my car bending over putting on my clothes  What makes it better:  Less movement   She is taking medications regularly.     Pain has increased from before  No problems breathing  No fever  Pain with any sort of movements or bending.  No abdominal pain   No break in the skin           Objective    /85 (BP Location: Left arm, Patient Position: Sitting, Cuff Size: Adult Regular)   Pulse 90   Temp 97.3  F (36.3  C) (Temporal)   Resp 18   Wt 67.8 kg (149 lb 8 oz)   LMP 06/10/2013 (Exact Date)   SpO2 98%   BMI 26.48 kg/m    Body mass index is 26.48 kg/m .  Physical Exam   GENERAL APPEARANCE: healthy, alert and no distress  EYES: Eyes grossly normal to inspection and conjunctivae and sclerae normal  NECK: no adenopathy and trachea midline and normal to palpation  RESP: " lungs clear to auscultation - no rales, rhonchi or wheezes  CV: regular rates and rhythm, normal S1 S2  Chest: tender along the right lateral and anterior ribs   ABDOMEN: soft, non-tender and no rebound or guarding   MS: extremities normal- no gross deformities noted  SKIN: no suspicious lesions or rashes  NEURO: Normal strength and tone, mentation intact and speech normal  PSYCH: mentation appears normal      No results found for this or any previous visit (from the past 24 hour(s)).        EXAM: XR RIBS & CHEST RT 3VW  DATE/TIME: 9/23/2024 11:12 AM     INDICATION: Rib contusion, right, initial encounter  COMPARISON: CT 12/31/2021                                                                      IMPRESSION: The cardiac silhouette is normal in size and configuration  without pulmonary vascular congestion. No pleural effusion,  pneumothorax, or consolidation. No rib fractures.        NAOMI WHITTAKER DO        Signed Electronically by: Asmita Bhatia MD MPH

## 2024-10-28 ENCOUNTER — IMMUNIZATION (OUTPATIENT)
Dept: FAMILY MEDICINE | Facility: CLINIC | Age: 54
End: 2024-10-28
Payer: COMMERCIAL

## 2024-10-28 VITALS — TEMPERATURE: 97.4 F

## 2024-10-28 DIAGNOSIS — Z23 ENCOUNTER FOR IMMUNIZATION: Primary | ICD-10-CM

## 2024-10-28 PROCEDURE — 91320 SARSCV2 VAC 30MCG TRS-SUC IM: CPT

## 2024-10-28 PROCEDURE — 90480 ADMN SARSCOV2 VAC 1/ONLY CMP: CPT

## 2024-10-28 PROCEDURE — 99207 PR NO CHARGE NURSE ONLY: CPT

## 2024-10-28 NOTE — PROGRESS NOTES
Prior to immunization administration, verified patients identity using patient s name and date of birth. Please see Immunization Activity for additional information.     Is the patient's temperature normal (100.5 or less)? Yes     Patient MEETS CRITERIA. PROCEED with vaccine administration.      Patient instructed to remain in clinic for 15 minutes afterwards, and to report any adverse reactions.      Link to Ancillary Visit Immunization Standing Orders SmartSet     Screening performed by Diamond Hi MA on 10/28/2024 at 2:22 PM.

## 2024-12-18 ENCOUNTER — TELEPHONE (OUTPATIENT)
Dept: FAMILY MEDICINE | Facility: CLINIC | Age: 54
End: 2024-12-18
Payer: COMMERCIAL

## 2024-12-18 DIAGNOSIS — K13.70 ORAL LESION: Primary | ICD-10-CM

## 2024-12-18 NOTE — TELEPHONE ENCOUNTER
Unable to see where this was discussed with provider already. Last OV 10/4/24 for rib contusion.    Routing to provider to please advise if patient needs appt or eVisit for requested referral (has appt w/ specialty already this Friday 20th)      Gayle Krishna, RN, BSN  [facility]  [place], [place] and [place]

## 2024-12-18 NOTE — TELEPHONE ENCOUNTER
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 [facility] ([address] [place] [address])    Does patient have an appointment scheduled?: Yes: Friday, 12/20 at [facility]    Where to send orders: Fax referral to [facility] at [phone]    Could we [facility] this information to you [facility] or would you prefer to receive a phone call?:   No preference   Okay to leave a detailed message?: Yes at Home number on file [phone] (home)

## 2024-12-19 NOTE — TELEPHONE ENCOUNTER
It would be great if patient can submit an E visit with additional information.   Was this done at her dentist or at the ENT?  Was a biopsy done?  What sort of cancer cells?  I would be happy to put in the referral, just need more information.     Thank you,  Asmita Bhatia MD MPH

## 2024-12-19 NOTE — TELEPHONE ENCOUNTER
Writer called patient and relayed the provider's message.  She is very worried about not getting a response in time from her E-visit.  Patient provided information to writer and will submit an E-visit as well.      B: In 2016-patient was in the dentist and had a canker sore type lesion removed by an oral surgeon.  She cannot remember what type of cancer it was but they told her they got it all at that time.      A: Now-Patient was into the dentist and they noted weird tissue growing under her tongue.  It is in the soft tissue under the tongue and is the entire width of that area.  It looks different than in 2016.  No pain, no trouble eating or drinking, just a little dry mouth.  She also has a clogged salivary duct per the dentist.  Patient is going to the surgeon tomorrow to have a biopsy done.      R: Patient would like referral ASAP as she is having this procedure done tomorrow.      Routing to provider to review and advise.    Kristina Kjellberg, MSN, RN  [facility] Triage

## 2024-12-19 NOTE — TELEPHONE ENCOUNTER
Referral faxed to [facility] [phone] on 12/19/24 at 10:56am. Called pt so she is aware this has been completed.

## 2025-01-23 ENCOUNTER — OFFICE VISIT (OUTPATIENT)
Dept: FAMILY MEDICINE | Facility: CLINIC | Age: 55
End: 2025-01-23
Attending: PREVENTIVE MEDICINE
Payer: COMMERCIAL

## 2025-01-23 VITALS
WEIGHT: 149.6 LBS | HEIGHT: 63 IN | DIASTOLIC BLOOD PRESSURE: 75 MMHG | HEART RATE: 94 BPM | TEMPERATURE: 97.5 F | OXYGEN SATURATION: 100 % | RESPIRATION RATE: 16 BRPM | BODY MASS INDEX: 26.51 KG/M2 | SYSTOLIC BLOOD PRESSURE: 127 MMHG

## 2025-01-23 DIAGNOSIS — E78.5 HYPERLIPIDEMIA LDL GOAL <100: ICD-10-CM

## 2025-01-23 DIAGNOSIS — Z00.00 ROUTINE GENERAL MEDICAL EXAMINATION AT A HEALTH CARE FACILITY: Primary | ICD-10-CM

## 2025-01-23 DIAGNOSIS — Z12.31 VISIT FOR SCREENING MAMMOGRAM: ICD-10-CM

## 2025-01-23 DIAGNOSIS — Z28.20 VACCINE REFUSED BY PATIENT: ICD-10-CM

## 2025-01-23 DIAGNOSIS — K13.70 ORAL LESION: ICD-10-CM

## 2025-01-23 SDOH — HEALTH STABILITY: PHYSICAL HEALTH: ON AVERAGE, HOW MANY MINUTES DO YOU ENGAGE IN EXERCISE AT THIS LEVEL?: 60 MIN

## 2025-01-23 SDOH — HEALTH STABILITY: PHYSICAL HEALTH: ON AVERAGE, HOW MANY DAYS PER WEEK DO YOU ENGAGE IN MODERATE TO STRENUOUS EXERCISE (LIKE A BRISK WALK)?: 3 DAYS

## 2025-01-23 ASSESSMENT — SOCIAL DETERMINANTS OF HEALTH (SDOH): HOW OFTEN DO YOU GET TOGETHER WITH FRIENDS OR RELATIVES?: TWICE A WEEK

## 2025-01-23 NOTE — PROGRESS NOTES
"Preventive Care Visit  [facility]  Asmita Bhatia MD, Family Medicine  Jan 23, 2025      Assessment & Plan     Routine general medical examination at a health care facility  -preventive guidelines reviewed   - PRIMARY CARE FOLLOW-UP SCHEDULING  - REVIEW OF HEALTH MAINTENANCE PROTOCOL ORDERS    Hyperlipidemia LDL goal <100  -mild atherosclerotic calcification of the aortic arch incidentally noted on CT Chest done 12/21  - Lipid panel reflex to direct LDL Non-fasting      The 10-year ASCVD risk score (Debbie DK, et al., 2019) is: 2.1%    Values used to calculate the score:      Age: 54 years      Sex: Female      Is Non- : Yes      Diabetic: No      Tobacco smoker: No      Systolic Blood Pressure: 127 mmHg      Is BP treated: No      HDL Cholesterol: 51 mg/dL      Total Cholesterol: 159 mg/dL      Visit for screening mammogram  - MA Screen Bilateral w/Elias    Vaccine refused by patient  -declined all vaccines today     Oral lesion  -being followed by oral surgery  - Basic metabolic panel  (Ca, Cl, CO2, Creat, Gluc, K, Na, BUN)            BMI  Estimated body mass index is 26.5 kg/m  as calculated from the following:    Height as of this encounter: 1.6 m (5' 3\").    Weight as of this encounter: 67.9 kg (149 lb 9.6 oz).       Counseling  Appropriate preventive services were addressed with this patient via screening, questionnaire, or discussion as appropriate for fall prevention, nutrition, physical activity, Tobacco-use cessation, social engagement, weight loss and cognition.  Checklist reviewing preventive services available has been given to the patient.  Reviewed patient's diet, addressing concerns and/or questions.   She is at risk for lack of exercise and has been provided with information to increase physical activity for the benefit of her well-being.           Geraldine Gallego is a 54 year old, presenting for the following:  Physical        1/23/2025     " 3:39 PM   Additional Questions   Roomed by sobeida   Accompanied by self         1/23/2025     3:39 PM   Patient Reported Additional Medications   Patient reports taking the following new medications No          HPI      Mammogram 1/29/24  Colonoscopy 4/19, repeat in 10 years   Has changed jobs, happy with work.  No changes in family history  Has increased exercise, reduced sugar, intermittent fasting and lost weight.     End of February has follow up with oral surgeon. Had biopsy done.        Wt Readings from Last 2 Encounters:   01/23/25 67.9 kg (149 lb 9.6 oz)   10/04/24 67.8 kg (149 lb 8 oz)          Health Care Directive  Patient does not have a Health Care Directive: Discussed advance care planning with patient; information given to patient to review.      1/23/2025   General Health   How would you rate your overall physical health? Excellent   Feel stress (tense, anxious, or unable to sleep) Only a little   (!) STRESS CONCERN      1/23/2025   Nutrition   Three or more servings of calcium each day? Yes   Diet: Regular (no restrictions)   How many servings of fruit and vegetables per day? (!) 0-1   How many sweetened beverages each day? 0-1         1/23/2025   Exercise   Days per week of moderate/strenous exercise 3 days   Average minutes spent exercising at this level 60 min         1/23/2025   Social Factors   Frequency of gathering with friends or relatives Twice a week   Worry food won't last until get money to buy more No   Food not last or not have enough money for food? No   Do you have housing? (Housing is defined as stable permanent housing and does not include staying ouside in a car, in a tent, in an abandoned building, in an overnight shelter, or couch-surfing.) Yes   Are you worried about losing your housing? No   Lack of transportation? No   Unable to get utilities (heat,electricity)? No         1/23/2025   Fall Risk   Fallen 2 or more times in the past year? No   Trouble with walking or balance? No           1/23/2025   Dental   Dentist two times every year? Yes         1/23/2025   TB Screening   Were you born outside of the [place]? No         Today's PHQ-2 Score:       1/23/2025     3:24 PM   PHQ-2 ( 1999 Pf[employer])   Q1: Little interest or pleasure in doing things 0   Q2: Feeling down, depressed or hopeless 0   PHQ-2 Score 0    Q1: Little interest or pleasure in doing things Not at all   Q2: Feeling down, depressed or hopeless Not at all   PHQ-2 Score 0       Patient-reported           1/23/2025   Substance Use   Alcohol more than 3/day or more than 7/wk Not Applicable   Do you use any other substances recreationally? No     Social History     Tobacco Use    Smoking status: Never    Smokeless tobacco: Never   Vaping Use    Vaping status: Never Used   Substance Use Topics    Alcohol use: No     Alcohol/week: 0.0 standard drinks of alcohol    Drug use: No           1/29/2024   LAST FHS-7 RESULTS   1st degree relative breast or ovarian cancer No   Any relative bilateral breast cancer No   Any male have breast cancer No   Any ONE woman have BOTH breast AND ovarian cancer No   Any woman with breast cancer before 50yrs No   2 or more relatives with breast AND/OR ovarian cancer No   2 or more relatives with breast AND/OR bowel cancer No        Mammogram Screening - Mammogram every 1-2 years updated in Health Maintenance based on mutual decision making        1/23/2025   STI Screening   New sexual partner(s) since last STI/HIV test? No     History of abnormal Pap smear: Status post hysterectomy with removal of cervix and no history of CIN2 or greater or cervical cancer. Health Maintenance and Surgical History updated.        9/4/2012     3:55 PM 10/16/2009    12:00 AM   PAP / HPV   PAP (Historical) NIL  NIL      ASCVD Risk   The 10-year ASCVD risk score (Debbie CURIEL, et al., 2019) is: 2.1%    Values used to calculate the score:      Age: 54 years      Sex: Female      Is Non- : Yes       Diabetic: No      Tobacco smoker: No      Systolic Blood Pressure: 127 mmHg      Is BP treated: No      HDL Cholesterol: 51 mg/dL      Total Cholesterol: 159 mg/dL         Reviewed and updated as needed this visit by Provider                    Past Medical History:   Diagnosis Date    Adjustment disorder with depressed mood 1/19/2016    Breast fibroadenoma, right 2016    excised    Complication of anesthesia     nausea/ use scop. patches    H/O: hysterectomy 2013    Hypothyroidism 2005    Now resolved    Leiomyoma of uterus, unspecified 2013    resolved    Menorrhagia 2000    resolved    Migraine headache 10/2/2012    Recurrent UTI 2012    TBI (traumatic brain injury), without LOC, sequela 9/12/2017    Vitamin B 12 deficiency 2005    Resolved     Past Surgical History:   Procedure Laterality Date    BIOPSY BREAST Right 6/22/2016    Procedure: BIOPSY BREAST;  Surgeon: Kaiser Roy MD;  [facility]: [facility]    COLONOSCOPY WITH CO2 INSUFFLATION N/A 4/4/2019    Procedure: COLONOSCOPY WITH CO2 INSUFFLATION;  Surgeon: Moise Ingram MD;  Location: [facility] OR    HYSTERECTOMY, PAP NO LONGER INDICATED      SKIN GRAFT, EACH ADDN 100SQCM  1977    buttocks to left  foot    SURGICAL HISTORY OF -   2004    L wrist tendon    ZZC TOTAL ABDOM HYSTERECTOMY  06/11/2013    benign fibroids    ZZC TREAT ECTOPIC PREG,RMV TUBE/OVARY  1995     Lab work is in process  Labs reviewed in EPIC  BP Readings from Last 3 Encounters:   01/23/25 127/75   10/04/24 127/85   09/23/24 131/85    Wt Readings from Last 3 Encounters:   01/23/25 67.9 kg (149 lb 9.6 oz)   10/04/24 67.8 kg (149 lb 8 oz)   09/23/24 69.4 kg (152 lb 14.4 oz)                  Patient Active Problem List   Diagnosis    Tension headache    Migraine headache    Adjustment disorder with depressed mood    Seasonal allergic rhinitis, unspecified allergic rhinitis trigger    TBI (traumatic brain injury), without LOC, sequela    Adnexal cyst    Low back pain    Lumbago with  sciatica, left side    Hyperlipidemia LDL goal <70     Past Surgical History:   Procedure Laterality Date    BIOPSY BREAST Right 6/22/2016    Procedure: BIOPSY BREAST;  Surgeon: Kaiser Roy MD;  Location:  [facility]    COLONOSCOPY WITH CO2 INSUFFLATION N/A 4/4/2019    Procedure: COLONOSCOPY WITH CO2 INSUFFLATION;  Surgeon: Moise Ingram MD;  Location: [facility]    HYSTERECTOMY, PAP NO LONGER INDICATED      SKIN GRAFT, EACH ADDN 100SQCM  1977    buttocks to left  foot    SURGICAL HISTORY OF -   2004    L wrist tendon    ZZC TOTAL ABDOM HYSTERECTOMY  06/11/2013    benign fibroids    ZZC TREAT ECTOPIC PREG,RMV TUBE/OVARY  1995       Social History     Tobacco Use    Smoking status: Never    Smokeless tobacco: Never   Substance Use Topics    Alcohol use: No     Alcohol/week: 0.0 standard drinks of alcohol     Family History   Problem Relation Age of Onset    Diabetes Mother     Diabetes Maternal Grandmother     Hypertension Maternal Grandmother     Arthritis Maternal Grandmother     Cancer Maternal Grandmother         bladder cancer/cervix    Cancer Maternal Grandfather         bone    Asthma Son     Unknown/Adopted Paternal Grandfather     Unknown/Adopted Paternal Grandmother     Cervical Cancer Maternal Aunt          Current Outpatient Medications   Medication Sig Dispense Refill    ibuprofen (ADVIL/MOTRIN) 600 MG tablet Take 1 tablet (600 mg) by mouth every 8 hours as needed for moderate pain. Take with food 30 tablet 0    methocarbamol (ROBAXIN) 500 MG tablet Take 1 tablet (500 mg) by mouth 2 times daily as needed for muscle spasms. 20 tablet 0    Multiple Vitamins-Minerals (HAIR SKIN NAILS) CAPS       naproxen (NAPROSYN) 500 MG tablet Take 1 tablet (500 mg) by mouth 2 times daily (with meals). 14 tablet 0    lidocaine, viscous, (XYLOCAINE) 2 % solution Swish and spit 5 mLs in mouth every 3 hours as needed for pain ; Max 8 doses/24 hour period. (Patient not taking: Reported on 1/23/2025) 100 mL 0  "    Allergies   Allergen Reactions    Atorvastatin Muscle Pain (Myalgia)    Medrol [Methylprednisolone] Itching    Promethazine Itching    Vicodin [Hydrocodone-Acetaminophen]      itchy         Review of Systems  Constitutional, HEENT, cardiovascular, pulmonary, gi and gu systems are negative, except as otherwise noted.     Objective    Exam  /75 (BP Location: Left arm, Patient Position: Sitting, Cuff Size: Adult Regular)   Pulse 94   Temp 97.5  F (36.4  C) (Temporal)   Resp 16   Ht 1.6 m (5' 3\")   Wt 67.9 kg (149 lb 9.6 oz)   LMP 06/10/2013 (Exact Date)   SpO2 100%   BMI 26.50 kg/m     Estimated body mass index is 26.5 kg/m  as calculated from the following:    Height as of this encounter: 1.6 m (5' 3\").    Weight as of this encounter: 67.9 kg (149 lb 9.6 oz).    Physical Exam  GENERAL APPEARANCE: healthy, alert and no distress  EYES: Eyes grossly normal to inspection and conjunctivae and sclerae normal  NECK: no adenopathy and trachea midline and normal to palpation  RESP: lungs clear to auscultation - no rales, rhonchi or wheezes  CV: regular rates and rhythm, normal S1 S2, no S3 or S4 and no murmur, click or rub  ABDOMEN: soft, non-tender and no rebound or guarding   MS: extremities normal- no gross deformities noted and peripheral pulses normal  SKIN: no suspicious lesions or rashes  NEURO: Normal strength and tone, mentation intact and speech normal  PSYCH: mentation appears normal          Signed Electronically by: Asmita Bhatia MD    "

## 2025-01-23 NOTE — PATIENT INSTRUCTIONS
Patient Education   Preventive Care Advice   This is general advice given by our system to help you stay healthy. However, your care team may have specific advice just for you. Please talk to your care team about your preventive care needs.  Nutrition  Eat 5 or more servings of fruits and vegetables each day.  Try wheat bread, brown rice and whole grain pasta (instead of white bread, rice, and pasta).  Get enough calcium and vitamin D. Check the label on foods and aim for 100% of the RDA (recommended daily allowance).  Lifestyle  Exercise at least 150 minutes each week  (30 minutes a day, 5 days a week).  Do muscle strengthening activities 2 days a week. These help control your weight and prevent disease.  No smoking.  Wear sunscreen to prevent skin cancer.  Have a dental exam and cleaning every 6 months.  Yearly exams  See your health care team every year to talk about:  Any changes in your health.  Any medicines your care team has prescribed.  Preventive care, family planning, and ways to prevent chronic diseases.  Shots (vaccines)   HPV shots (up to age 26), if you've never had them before.  Hepatitis B shots (up to age 59), if you've never had them before.  COVID-19 shot: Get this shot when it's due.  Flu shot: Get a flu shot every year.  Tetanus shot: Get a tetanus shot every 10 years.  Pneumococcal, hepatitis A, and RSV shots: Ask your care team if you need these based on your risk.  Shingles shot (for age 50 and up)  General health tests  Diabetes screening:  Starting at age 35, Get screened for diabetes at least every 3 years.  If you are younger than age 35, ask your care team if you should be screened for diabetes.  Cholesterol test: At age 39, start having a cholesterol test every 5 years, or more often if advised.  Bone density scan (DEXA): At age 50, ask your care team if you should have this scan for osteoporosis (brittle bones).  Hepatitis C: Get tested at least once in your life.  STIs (sexually  transmitted infections)  Before age 24: Ask your care team if you should be screened for STIs.  After age 24: Get screened for STIs if you're at risk. You are at risk for STIs (including HIV) if:  You are sexually active with more than one person.  You don't use condoms every time.  You or a partner was diagnosed with a sexually transmitted infection.  If you are at risk for HIV, ask about PrEP medicine to prevent HIV.  Get tested for HIV at least once in your life, whether you are at risk for HIV or not.  Cancer screening tests  Cervical cancer screening: If you have a cervix, begin getting regular cervical cancer screening tests starting at age 21.  Breast cancer scan (mammogram): If you've ever had breasts, begin having regular mammograms starting at age 40. This is a scan to check for breast cancer.  Colon cancer screening: It is important to start screening for colon cancer at age 45.  Have a colonoscopy test every 10 years (or more often if you're at risk) Or, ask your provider about stool tests like a FIT test every year or Cologuard test every 3 years.  To learn more about your testing options, visit:   .  For help making a decision, visit:   https://bit.ly/sz35014.  Prostate cancer screening test: If you have a prostate, ask your care team if a prostate cancer screening test (PSA) at age 55 is right for you.  Lung cancer screening: If you are a current or former smoker ages 50 to 80, ask your care team if ongoing lung cancer screenings are right for you.  For informational purposes only. Not to replace the advice of your health care provider. Copyright   2023 [employer]. All rights reserved. Clinically reviewed by the [facility] Program. [employer] [id number] - REV 01/24.

## 2025-02-24 ENCOUNTER — TRANSFERRED RECORDS (OUTPATIENT)
Dept: HEALTH INFORMATION MANAGEMENT | Facility: CLINIC | Age: 55
End: 2025-02-24
Payer: COMMERCIAL

## 2025-03-12 ENCOUNTER — ANCILLARY PROCEDURE (OUTPATIENT)
Dept: MAMMOGRAPHY | Facility: CLINIC | Age: 55
End: 2025-03-12
Attending: PREVENTIVE MEDICINE
Payer: COMMERCIAL

## 2025-03-12 DIAGNOSIS — Z12.31 VISIT FOR SCREENING MAMMOGRAM: ICD-10-CM

## 2025-03-12 PROCEDURE — 77067 SCR MAMMO BI INCL CAD: CPT | Performed by: RADIOLOGY

## 2025-03-12 PROCEDURE — 77063 BREAST TOMOSYNTHESIS BI: CPT | Performed by: RADIOLOGY

## 2025-07-07 ENCOUNTER — OFFICE VISIT (OUTPATIENT)
Dept: URGENT CARE | Facility: URGENT CARE | Age: 55
End: 2025-07-07
Payer: COMMERCIAL

## 2025-07-07 VITALS
HEART RATE: 87 BPM | BODY MASS INDEX: 25.87 KG/M2 | SYSTOLIC BLOOD PRESSURE: 134 MMHG | TEMPERATURE: 98 F | DIASTOLIC BLOOD PRESSURE: 80 MMHG | HEIGHT: 63 IN | RESPIRATION RATE: 18 BRPM | WEIGHT: 146 LBS | OXYGEN SATURATION: 97 %

## 2025-07-07 DIAGNOSIS — H10.9 BACTERIAL CONJUNCTIVITIS OF BOTH EYES: Primary | ICD-10-CM

## 2025-07-07 DIAGNOSIS — B96.89 BACTERIAL CONJUNCTIVITIS OF BOTH EYES: Primary | ICD-10-CM

## 2025-07-07 PROCEDURE — 3075F SYST BP GE 130 - 139MM HG: CPT

## 2025-07-07 PROCEDURE — 99213 OFFICE O/P EST LOW 20 MIN: CPT

## 2025-07-07 PROCEDURE — 3079F DIAST BP 80-89 MM HG: CPT

## 2025-07-07 PROCEDURE — 1125F AMNT PAIN NOTED PAIN PRSNT: CPT

## 2025-07-07 RX ORDER — POLYMYXIN B SULFATE AND TRIMETHOPRIM 1; 10000 MG/ML; [USP'U]/ML
2 SOLUTION OPHTHALMIC EVERY 6 HOURS
Qty: 10 ML | Refills: 0 | Status: SHIPPED | OUTPATIENT
Start: 2025-07-07 | End: 2025-07-14

## 2025-07-07 ASSESSMENT — PAIN SCALES - GENERAL: PAINLEVEL_OUTOF10: MODERATE PAIN (5)

## 2025-07-07 NOTE — PROGRESS NOTES
"ASSESSMENT:  (H10.9,  B96.89) Bacterial conjunctivitis of both eyes  (primary encounter diagnosis)  Plan: polymixin b-trimethoprim (POLYTRIM) 06195-2.1         UNIT/ML-% ophthalmic solution    PLAN:  Conjunctivitis patient instructions discussed and provided.  Informed the patient to use the eyedrops as prescribed.  We discussed using warm compress to the eyes for symptoms.  We also discussed returning to clinic with any new or worsening symptoms.  Patient acknowledged understanding of the above plan.    The use of Dragon/PowerMic dictation services may have been used to construct the content in this note; any grammatical or spelling errors are non-intentional. Please contact the author of this note directly if you are in need of any clarification.      KENNEDI Gongora CNP    SUBJECTIVE:  Lashonda Rivera is a 54 year old female who presents complaining of mild bilateral eye discomfort and watering for 2 day(s).   Details:  Associated Signs and Syptoms: none  Treatment measures tried include: none  Contact wearer : yes    ROS: negative except noted above      OBJECTIVE:  /80 (BP Location: Left arm, Patient Position: Sitting, Cuff Size: Adult Regular)   Pulse 87   Temp 98  F (36.7  C) (Oral)   Resp 18   Ht 1.6 m (5' 3\")   Wt 66.2 kg (146 lb)   LMP 06/10/2013 (Exact Date)   SpO2 97%   BMI 25.86 kg/m    General: no acute distress  Eye exam: bilateral eye: lids normal; PERRL, EOM's intact; mild conjunctival injection; increased tearing and yellow-greenish mucoid drainage in the inner canthus of the left eye  "

## 2025-07-07 NOTE — PROGRESS NOTES
Urgent Care Clinic Visit    Chief Complaint   Patient presents with    Eye Discharge Both Eyes     Drainage from both eyes, left eye pain                7/7/2025     5:23 PM   Additional Questions   Roomed by Kelly   Accompanied by Self

## 2025-07-16 ENCOUNTER — NURSE TRIAGE (OUTPATIENT)
Dept: FAMILY MEDICINE | Facility: CLINIC | Age: 55
End: 2025-07-16
Payer: COMMERCIAL

## 2025-07-16 ENCOUNTER — OFFICE VISIT (OUTPATIENT)
Dept: URGENT CARE | Facility: URGENT CARE | Age: 55
End: 2025-07-16
Payer: COMMERCIAL

## 2025-07-16 VITALS
TEMPERATURE: 95.8 F | HEART RATE: 98 BPM | RESPIRATION RATE: 20 BRPM | WEIGHT: 156.1 LBS | OXYGEN SATURATION: 97 % | DIASTOLIC BLOOD PRESSURE: 82 MMHG | BODY MASS INDEX: 27.65 KG/M2 | SYSTOLIC BLOOD PRESSURE: 132 MMHG

## 2025-07-16 DIAGNOSIS — M25.511 ACUTE PAIN OF RIGHT SHOULDER: Primary | ICD-10-CM

## 2025-07-16 PROCEDURE — 3075F SYST BP GE 130 - 139MM HG: CPT | Performed by: PHYSICIAN ASSISTANT

## 2025-07-16 PROCEDURE — 99214 OFFICE O/P EST MOD 30 MIN: CPT | Performed by: PHYSICIAN ASSISTANT

## 2025-07-16 PROCEDURE — 1125F AMNT PAIN NOTED PAIN PRSNT: CPT | Performed by: PHYSICIAN ASSISTANT

## 2025-07-16 PROCEDURE — 3079F DIAST BP 80-89 MM HG: CPT | Performed by: PHYSICIAN ASSISTANT

## 2025-07-16 RX ORDER — METHOCARBAMOL 500 MG/1
500 TABLET, FILM COATED ORAL 2 TIMES DAILY PRN
Qty: 20 TABLET | Refills: 0 | Status: SHIPPED | OUTPATIENT
Start: 2025-07-16

## 2025-07-16 ASSESSMENT — PAIN SCALES - GENERAL: PAINLEVEL_OUTOF10: SEVERE PAIN (8)

## 2025-07-16 NOTE — TELEPHONE ENCOUNTER
"Swelling from neck to shoulder    Nurse Triage SBAR    Is this a 2nd Level Triage? NO    Situation:   Patient has had right sided neck and shoulder swelling for approximately two weeks without improvement. Patient would like to be seen for an evaluation.     Background:   Home care: Applying Icy Hot PRN, neck massages     Assessment:   Neck, right sided: edema from superior aspect of neck, upper back and shoulder. Pain: moderate, constant. Full ROM, guarded when turning head right, patient hears a \"little crack\" and has shooting pain down arm. No pain with moving head up and down.    No recent injury or overuse to affected area. Patient thinks they might have injured themself in their sleep.    Impacting ability to work.    Denies weakness, numbness, SOB, difficulty breathing, chest pain    Protocol Recommended Disposition:   See in Office Today    Recommendation:   No in-person office visits available. Patient agreeable to going to urgent care now.    Lashawn Villarreal RN    Reason for Disposition   Patient wants to be seen    Additional Information   Negative: Shock suspected (e.g., cold/pale/clammy skin, too weak to stand, low BP, rapid pulse)   Negative: Similar pain previously and it was from 'heart attack'   Negative: Similar pain previously from 'angina' and not relieved by nitroglycerin   Negative: Difficult to awaken or acting confused (e.g., disoriented, slurred speech)   Negative: Sounds like a life-threatening emergency to the triager   Negative: Followed an injury to neck (e.g., MVA, sports, impact or collision)   Negative: Chest pain   Negative: Lymph node in the neck is swollen or painful to the touch   Negative: Sore throat is main symptom   Negative: Difficulty breathing or unusual sweating (e.g., sweating without exertion)   Negative: Chest pain lasting longer than 5 minutes   Negative: Stiff neck (can't touch chin to chest) and has headache   Negative: Stiff neck (can't touch chin to chest) " "and fever   Negative: Weakness of an arm or hand   Negative: Problems with bowel or bladder control   Negative: Patient sounds very sick or weak to the triager   Negative: SEVERE pain (e.g., excruciating, unable to do any normal activities)   Negative: Head is twisting to one side (or ask 'is it turning against your will?')   Negative: Fever > 103 F (39.4 C)   Negative: Fever > 100 F (37.8 C) and Intravenous Drug Use (IVDU)   Negative: Fever > 100 F (37.8 C) and has diabetes mellitus or a weak immune system (e.g., HIV positive, cancer chemotherapy, organ transplant, splenectomy, chronic steroids)   Negative: Numbness in an arm or hand (i.e., loss of sensation)   Negative: Painful rash with multiple small blisters grouped together (i.e., dermatomal distribution or 'band' or 'stripe')   Negative: High-risk adult (e.g., history of cancer, HIV, or IV Drug Use)    Answer Assessment - Initial Assessment Questions  1. ONSET: \"When did the pain begin?\"       2 weeks ago  2. LOCATION: \"Where does it hurt?\"       Right side of neck, upper back and shoulder  3. PATTERN \"Does the pain come and go, or has it been constant since it started?\"       constant  4. SEVERITY: \"How bad is the pain?\"  (Scale 0-10; or none or slight stiffness, mild, moderate, severe)      moderate  5. RADIATION: \"Does the pain go anywhere else, shoot into your arms?\"      Yes, arm when turning head to the right  6. CORD SYMPTOMS: \"Any weakness or numbness of the arms or legs?\"      denies  7. CAUSE: \"What do you think is causing the neck pain?\"      unsure  8. NECK OVERUSE: \"Any recent activities that involved turning or twisting the neck?\"      denies  9. OTHER SYMPTOMS: \"Do you have any other symptoms?\" (e.g., headache, fever, chest pain, difficulty breathing, neck swelling)      Swelling in the neck, upper back and shoulder  10. PREGNANCY: \"Is there any chance you are pregnant?\" \"When was your last menstrual period?\"        NA    Protocols used: Neck " Pain or [id number]
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
## 2025-07-16 NOTE — PROGRESS NOTES
Urgent Care Clinic Visit    Chief Complaint   Patient presents with    Pain     Right neck and shoulder pain x1.5week               7/16/2025    10:20 AM   Additional Questions   Roomed by Shazia THOMPSON   Accompanied by self

## 2025-07-16 NOTE — PATIENT INSTRUCTIONS
First follow-up with physical therapy    Heat, gentle range of motion, stretching and massage.  Modify activity.  Mild pain is okay as long as it resolves after a minute or 2 of discontinuing the activity.  Decrease level activity if pain is moderate to severe or last longer than a few minutes after discontinuing.    You can use the muscle relaxant as needed but it will make you drowsy so do not drink alcohol or drive while taking this medication.  Should be used sparingly.  I usually recommend only using it at night to potentially help you sleep.    Ibuprofen 400-600 mg (2-3 of the 200 mg OTC tablets or 400-600 mg of the children's liquid) up to 4 times daily with food or milk for 3-5 days   And or  Tylenol 500-1000 mg every 8 hours as needed

## 2025-07-28 ENCOUNTER — THERAPY VISIT (OUTPATIENT)
Dept: PHYSICAL THERAPY | Facility: CLINIC | Age: 55
End: 2025-07-28
Attending: PHYSICIAN ASSISTANT
Payer: COMMERCIAL

## 2025-07-28 DIAGNOSIS — M54.12 CERVICAL RADICULOPATHY: Primary | ICD-10-CM

## 2025-07-28 DIAGNOSIS — M25.511 ACUTE PAIN OF RIGHT SHOULDER: ICD-10-CM

## 2025-07-28 PROCEDURE — 97110 THERAPEUTIC EXERCISES: CPT | Mod: GP | Performed by: PHYSICAL THERAPIST

## 2025-07-28 PROCEDURE — 97530 THERAPEUTIC ACTIVITIES: CPT | Mod: GP | Performed by: PHYSICAL THERAPIST

## 2025-07-28 PROCEDURE — 97161 PT EVAL LOW COMPLEX 20 MIN: CPT | Mod: GP | Performed by: PHYSICAL THERAPIST

## 2025-07-28 ASSESSMENT — ACTIVITIES OF DAILY LIVING (ADL)
PUTTING_ON_AN_UNDERSHIRT_OR_A_PULLOVER_SWEATER: 5
PUTTING_ON_A_SHIRT_THAT_BUTTONS_DOWN_THE_FRONT: 0
REMOVING_SOMETHING_FROM_YOUR_BACK_POCKET: 0
PUSHING_WITH_THE_INVOLVED_ARM: 7
WASHING_YOUR_BACK: 5
CARRYING_A_HEAVY_OBJECT_OF_10_POUNDS: 7
PLACING_AN_OBJECT_ON_A_HIGH_SHELF: 6
TOUCHING_THE_BACK_OF_YOUR_NECK: 0
AT_ITS_WORST?: 7
PUTTING_ON_YOUR_PANTS: 0
WHEN_LYING_ON_THE_INVOLVED_SIDE: 7
PLEASE_INDICATE_YOR_PRIMARY_REASON_FOR_REFERRAL_TO_THERAPY:: SHOULDER
WASHING_YOUR_HAIR?: 7
REACHING_FOR_SOMETHING_ON_A_HIGH_SHELF: 7
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
## 2025-08-14 ENCOUNTER — VIRTUAL VISIT (OUTPATIENT)
Dept: FAMILY MEDICINE | Facility: CLINIC | Age: 55
End: 2025-08-14
Payer: COMMERCIAL

## 2025-08-14 DIAGNOSIS — M25.511 ACUTE PAIN OF RIGHT SHOULDER: Primary | ICD-10-CM

## 2025-08-14 DIAGNOSIS — M54.12 CERVICAL RADICULOPATHY: ICD-10-CM

## (undated) DEVICE — PREP CHLORAPREP 26ML TINTED ORANGE  260815

## (undated) DEVICE — SOL WATER IRRIG 1000ML BOTTLE 07139-09

## (undated) RX ORDER — FENTANYL CITRATE 50 UG/ML
INJECTION, SOLUTION INTRAMUSCULAR; INTRAVENOUS
Status: DISPENSED
Start: 2019-04-04

## (undated) RX ORDER — SIMETHICONE 40MG/0.6ML
SUSPENSION, DROPS(FINAL DOSAGE FORM)(ML) ORAL
Status: DISPENSED
Start: 2019-04-04